# Patient Record
Sex: FEMALE | Race: WHITE | NOT HISPANIC OR LATINO | Employment: FULL TIME | URBAN - METROPOLITAN AREA
[De-identification: names, ages, dates, MRNs, and addresses within clinical notes are randomized per-mention and may not be internally consistent; named-entity substitution may affect disease eponyms.]

---

## 2017-01-07 ENCOUNTER — HOSPITAL ENCOUNTER (OUTPATIENT)
Dept: LAB | Facility: MEDICAL CENTER | Age: 37
End: 2017-01-07
Attending: NURSE PRACTITIONER
Payer: COMMERCIAL

## 2017-01-07 LAB
ALBUMIN SERPL BCP-MCNC: 4.3 G/DL (ref 3.2–4.9)
ALBUMIN/GLOB SERPL: 1.4 G/DL
ALP SERPL-CCNC: 54 U/L (ref 30–99)
ALT SERPL-CCNC: 11 U/L (ref 2–50)
AMYLASE SERPL-CCNC: 46 U/L (ref 20–103)
ANION GAP SERPL CALC-SCNC: 4 MMOL/L (ref 0–11.9)
AST SERPL-CCNC: 14 U/L (ref 12–45)
BASOPHILS # BLD AUTO: 0.05 K/UL (ref 0–0.12)
BASOPHILS NFR BLD AUTO: 0.6 % (ref 0–1.8)
BILIRUB SERPL-MCNC: 0.4 MG/DL (ref 0.1–1.5)
BUN SERPL-MCNC: 16 MG/DL (ref 8–22)
CALCIUM SERPL-MCNC: 9.6 MG/DL (ref 8.5–10.5)
CHLORIDE SERPL-SCNC: 107 MMOL/L (ref 96–112)
CHOLEST SERPL-MCNC: 153 MG/DL (ref 100–199)
CO2 SERPL-SCNC: 27 MMOL/L (ref 20–33)
CREAT SERPL-MCNC: 0.6 MG/DL (ref 0.5–1.4)
EOSINOPHIL # BLD: 0.14 K/UL (ref 0–0.51)
EOSINOPHIL NFR BLD AUTO: 1.6 % (ref 0–6.9)
ERYTHROCYTE [DISTWIDTH] IN BLOOD BY AUTOMATED COUNT: 44.6 FL (ref 35.9–50)
GLOBULIN SER CALC-MCNC: 3.1 G/DL (ref 1.9–3.5)
GLUCOSE SERPL-MCNC: 83 MG/DL (ref 65–99)
HCT VFR BLD AUTO: 39.6 % (ref 37–47)
HDLC SERPL-MCNC: 55 MG/DL
HGB BLD-MCNC: 12.8 G/DL (ref 12–16)
IMM GRANULOCYTES # BLD AUTO: 0.01 K/UL (ref 0–0.11)
IMM GRANULOCYTES NFR BLD AUTO: 0.1 % (ref 0–0.9)
LDLC SERPL CALC-MCNC: 82 MG/DL
LIPASE SERPL-CCNC: 21 U/L (ref 11–82)
LYMPHOCYTES # BLD: 4.18 K/UL (ref 1–4.8)
LYMPHOCYTES NFR BLD AUTO: 46.5 % (ref 22–41)
MCH RBC QN AUTO: 31.9 PG (ref 27–33)
MCHC RBC AUTO-ENTMCNC: 32.3 G/DL (ref 33.6–35)
MCV RBC AUTO: 98.8 FL (ref 81.4–97.8)
MONOCYTES # BLD: 0.56 K/UL (ref 0–0.85)
MONOCYTES NFR BLD AUTO: 6.2 % (ref 0–13.4)
NEUTROPHILS # BLD: 4.05 K/UL (ref 2–7.15)
NEUTROPHILS NFR BLD AUTO: 45 % (ref 44–72)
NRBC # BLD AUTO: 0 K/UL
NRBC BLD-RTO: 0 /100 WBC
PLATELET # BLD AUTO: 233 K/UL (ref 164–446)
PMV BLD AUTO: 10.5 FL (ref 9–12.9)
POTASSIUM SERPL-SCNC: 4.5 MMOL/L (ref 3.6–5.5)
PROT SERPL-MCNC: 7.4 G/DL (ref 6–8.2)
RBC # BLD AUTO: 4.01 M/UL (ref 4.2–5.4)
SODIUM SERPL-SCNC: 138 MMOL/L (ref 135–145)
TRIGL SERPL-MCNC: 79 MG/DL (ref 0–149)
TSH SERPL DL<=0.005 MIU/L-ACNC: 1.14 UIU/ML (ref 0.3–3.7)
WBC # BLD AUTO: 9 K/UL (ref 4.8–10.8)

## 2017-01-07 PROCEDURE — 84443 ASSAY THYROID STIM HORMONE: CPT

## 2017-01-07 PROCEDURE — 85025 COMPLETE CBC W/AUTO DIFF WBC: CPT

## 2017-01-07 PROCEDURE — 83690 ASSAY OF LIPASE: CPT

## 2017-01-07 PROCEDURE — 82150 ASSAY OF AMYLASE: CPT

## 2017-01-07 PROCEDURE — 80053 COMPREHEN METABOLIC PANEL: CPT

## 2017-01-07 PROCEDURE — 36415 COLL VENOUS BLD VENIPUNCTURE: CPT

## 2017-01-07 PROCEDURE — 80061 LIPID PANEL: CPT

## 2017-01-13 ENCOUNTER — HOSPITAL ENCOUNTER (OUTPATIENT)
Dept: RADIOLOGY | Facility: MEDICAL CENTER | Age: 37
End: 2017-01-13
Attending: NURSE PRACTITIONER
Payer: COMMERCIAL

## 2017-01-13 DIAGNOSIS — R10.11 ABDOMINAL PAIN, RIGHT UPPER QUADRANT: ICD-10-CM

## 2017-01-13 PROCEDURE — 76700 US EXAM ABDOM COMPLETE: CPT

## 2017-02-08 ENCOUNTER — OFFICE VISIT (OUTPATIENT)
Dept: URGENT CARE | Facility: PHYSICIAN GROUP | Age: 37
End: 2017-02-08
Payer: COMMERCIAL

## 2017-02-08 VITALS
WEIGHT: 189.9 LBS | OXYGEN SATURATION: 96 % | RESPIRATION RATE: 16 BRPM | SYSTOLIC BLOOD PRESSURE: 102 MMHG | DIASTOLIC BLOOD PRESSURE: 72 MMHG | TEMPERATURE: 99.3 F | HEART RATE: 88 BPM

## 2017-02-08 DIAGNOSIS — M43.6 TORTICOLLIS, ACQUIRED: ICD-10-CM

## 2017-02-08 DIAGNOSIS — H57.89 EYE IRRITATION: ICD-10-CM

## 2017-02-08 PROCEDURE — 99214 OFFICE O/P EST MOD 30 MIN: CPT | Performed by: PHYSICIAN ASSISTANT

## 2017-02-08 RX ORDER — CIPROFLOXACIN HYDROCHLORIDE 3.5 MG/ML
1 SOLUTION/ DROPS TOPICAL EVERY 4 HOURS
Qty: 1 BOTTLE | Refills: 0 | Status: SHIPPED | OUTPATIENT
Start: 2017-02-08 | End: 2019-02-07

## 2017-02-08 RX ORDER — CYCLOBENZAPRINE HCL 10 MG
TABLET ORAL
Qty: 10 TAB | Refills: 0 | Status: SHIPPED | OUTPATIENT
Start: 2017-02-08 | End: 2019-02-07

## 2017-02-08 NOTE — MR AVS SNAPSHOT
Mattie Hicks   2017 6:30 PM   Office Visit   MRN: [id number]    Department:  [facility]   Dept Phone:  [phone]    Description:  Female : 1980   Provider:  Jannet Cedeno PA-C           Reason for Visit     Conjunctivitis pink eye, muscle spasam in neck      Allergies as of 2017     No Known Allergies      Vital Signs     Blood Pressure Pulse Temperature Respirations Weight Oxygen Saturation    102/72 mmHg 88 37.4 °C (99.3 °F) 16 86.138 kg (189 lb 14.4 oz) 96%    Smoking Status                   Never Smoker            Basic Information     Date Of Birth Sex Race Ethnicity Preferred Language    1980 Female White Unknown English      Health Maintenance        Date Due Completion Dates    IMM DTaP/Tdap/Td Vaccine (1 - Tdap) 1998 ---    PAP SMEAR 2000 ---    IMM INFLUENZA (1) 2016 ---            Current Immunizations     No immunizations on file.      Below and/or attached are the medications your provider expects you to take. Review all of your home medications and newly ordered medications with your provider and/or pharmacist. Follow medication instructions as directed by your provider and/or pharmacist. Please keep your medication list with you and share with your provider. Update the information when medications are discontinued, doses are changed, or new medications (including over-the-counter products) are added; and carry medication information at all times in the event of emergency situations     Allergies:  No Known Allergies          Medications  Valid as of: 2017 -  7:16 PM    Generic Name Brand Name Tablet Size Instructions for use    .                 Medicines prescribed today were sent to:     [id number] [place] [address] [place] AT [address] [place] [address]    Phone: [phone] Fax: [phone]    Open 24 Hours?: No      Medication refill instructions:       If  your prescription bottle indicates you have medication refills left, it is not necessary to call your provider’s office. Please contact your pharmacy and they will refill your medication.    If your prescription bottle indicates you do not have any refills left, you may request refills at any time through one of the following ways: The online [employer] system (except Urgent Care), by calling your provider’s office, or by asking your pharmacy to contact your provider’s office with a refill request. Medication refills are processed only during regular business hours and may not be available until the next business day. Your provider may request additional information or to have a follow-up visit with you prior to refilling your medication.   *Please Note: Medication refills are assigned a new Rx number when refilled electronically. Your pharmacy may indicate that no refills were authorized even though a new prescription for the same medication is available at the pharmacy. Please request the medicine by name with the pharmacy before contacting your provider for a refill.           [employer] Access Code: [email or web address]  Expires: 2/19/2017 10:27 AM    [employer]  A secure, online tool to manage your health information     [employer]’s [employer]® is a secure, online tool that connects you to your personalized health information from the privacy of your home -- day or night - making it very easy for you to manage your healthcare. Once the activation process is completed, you can even access your medical information using the [employer] archie, which is available for free in the Apple Archie store or Google Play store.     [employer] provides the following levels of access (as shown below):   My Chart Features   [facility] Doctor [facility]  Specialists [facility]  Urgent  Care Non-[facility]  Primary Care  Doctor   Email your healthcare team securely and privately 24/7 X X X    Manage appointments: schedule your next appointment; view  details of past/upcoming appointments X      Request prescription refills. X      View recent personal medical records, including lab and immunizations X X X X   View health record, including health history, allergies, medications X X X X   Read reports about your outpatient visits, procedures, consult and ER notes X X X X   See your discharge summary, which is a recap of your hospital and/or ER visit that includes your diagnosis, lab results, and care plan. X X       How to register for [employer]:  1. Go to  [email or web address].  2. Click on the Sign Up Now box, which takes you to the New Member Sign Up page. You will need to provide the following information:  a. Enter your [employer] Access Code exactly as it appears at the top of this page. (You will not need to use this code after you’ve completed the sign-up process. If you do not sign up before the expiration date, you must request a new code.)   b. Enter your date of birth.   c. Enter your home email address.   d. Click Submit, and follow the next screen’s instructions.  3. Create a [employer] ID. This will be your [employer] login ID and cannot be changed, so think of one that is secure and easy to remember.  4. Create a [employer] password. You can change your password at any time.  5. Enter your Password Reset Question and Answer. This can be used at a later time if you forget your password.   6. Enter your e-mail address. This allows you to receive e-mail notifications when new information is available in [employer].  7. Click Sign Up. You can now view your health information.    For assistance activating your [employer] account, call [phone]

## 2017-02-09 NOTE — PROGRESS NOTES
"Chief Complaint   Patient presents with   • Conjunctivitis     pink eye, muscle spasam in neck       HISTORY OF PRESENT ILLNESS: Patient is a 36 y.o. female who presents today for    1. Exposure to bacterial pink eye - her with  who has overt symptoms of pink eye and son was just treated.   She is starting to feel bilateral eye irritation, discomfort however has not noticed any redness or discharge at this point.  Does not wear contacts. No vision changes.  Has had some mild nasal congestion. No sore throat.  No coughing.      2.  Hx of neck pain/spasms, comes and goes unpredictably.  She feels that tightness/kink in her neck she gets is \"coming on as well\" and wants to know what she can take.  Has had this before but has never been treated.  No hx of spinal or neck injury.  No numbness, tingling or weakness in extremities.     There are no active problems to display for this patient.      Allergies:Review of patient's allergies indicates no known allergies.    No current [employer]-ordered outpatient prescriptions on file.     No current [employer]-ordered facility-administered medications on file.       History reviewed. No pertinent past medical history.    Social History   Substance Use Topics   • Smoking status: Never Smoker    • Smokeless tobacco: Never Used   • Alcohol Use: None       No family status information on file.   History reviewed. No pertinent family history.    ROS:  Review of Systems   Constitutional: Negative for fever, chills, weight loss and malaise/fatigue.   HENT: SEE HPI  Eyes: SEE HPI  Respiratory: Negative for cough, sputum production, shortness of breath and wheezing.    Cardiovascular: Negative for chest pain, palpitations, orthopnea and leg swelling.   Gastrointestinal: Negative for heartburn, nausea, vomiting and abdominal pain.   All other systems reviewed and are negative.       Exam:  Blood pressure 102/72, pulse 88, temperature 37.4 °C (99.3 °F), resp. rate 16, weight 86.138 kg (189 lb " 14.4 oz), SpO2 96 %.  General:  Well nourished, well developed female in NAD  Eyes: PERRLA, EOM within normal limits, no conjunctival injection, no scleral icterus, visual fields and acuity grossly intact.  Ears: Normal shape and symmetry, no tenderness, no discharge. External canals are without any significant edema or erythema. Tympanic membranes are without any inflammation, no effusion. Gross auditory acuity is intact  Nose: Symmetrical, sinuses without tenderness, no discharge.   Mouth: reasonable hygiene, no erythema exudates or tonsillar enlargement.  Neck: no masses, range of motion within normal limits, mild pain with lateral rotation to the far right, mild TTP along paraspinous muscles in right trap with spasms, normal CMS bilateral upper extremities, no tracheal deviation. No lymphadenopathy  Pulmonary: Normal respiratory effort, no wheezes, crackles, or rhonchi.  Cardiovascular: regular rate and rhythm without murmurs, rubs, or gallops.  Abdomen: Soft, nontender, nondistended. Normal bowel sounds. No hepatosplenomegaly or masses, or hernias. No rebound or guarding.  Skin: No visible rashes or lesion. Warm, pink, dry.   Extremities: no clubbing, cyanosis, or edema.  Neuro: A&O x 3. Speech normal/clear.  Normal gait.       Assessment/Plan:  1. Eye irritation  ciprofloxacin (CILOXIN) 0.3 % Solution    Exposure to bacterial pink eye   2. Torticollis, acquired  cyclobenzaprine (FLEXERIL) 10 MG Tab       -exposure to bacterial conjunctivitis others are being treated/or have been treated for,  -treatment as above.    -hand hygiene emphasized, warm compresses for soothing if needed  -ER precautions regarding eyes discussed  -recommend heat/ice prn.  Gentle stretches daily.   -Flexeril if needed for bedtime/spasms. No driving, caution drowsy.  Recommend anti-inflammatories OTC with food.   -neck pain red flags and ER precautions discussed with patient.     Supportive care, differential diagnoses, and indications  for immediate follow-up discussed with patient.   Pathogenesis of diagnosis discussed including typical length and natural progression.   Instructed to return to clinic or nearest emergency department for any change in condition, further concerns, or worsening of symptoms.  Patient states understanding of the plan of care and discharge instructions.  Instructed to make an appointment, for follow up, with their primary care provider.      Jannet Cedeno PA-C

## 2017-04-08 ENCOUNTER — HOSPITAL ENCOUNTER (OUTPATIENT)
Dept: LAB | Facility: MEDICAL CENTER | Age: 37
End: 2017-04-08
Attending: NURSE PRACTITIONER
Payer: COMMERCIAL

## 2017-04-08 LAB
BASOPHILS # BLD AUTO: 0.6 % (ref 0–1.8)
BASOPHILS # BLD: 0.04 K/UL (ref 0–0.12)
EOSINOPHIL # BLD AUTO: 0.1 K/UL (ref 0–0.51)
EOSINOPHIL NFR BLD: 1.4 % (ref 0–6.9)
ERYTHROCYTE [DISTWIDTH] IN BLOOD BY AUTOMATED COUNT: 44.6 FL (ref 35.9–50)
HCT VFR BLD AUTO: 40.3 % (ref 37–47)
HGB BLD-MCNC: 12.7 G/DL (ref 12–16)
IMM GRANULOCYTES # BLD AUTO: 0.01 K/UL (ref 0–0.11)
IMM GRANULOCYTES NFR BLD AUTO: 0.1 % (ref 0–0.9)
LYMPHOCYTES # BLD AUTO: 3.56 K/UL (ref 1–4.8)
LYMPHOCYTES NFR BLD: 49.9 % (ref 22–41)
MCH RBC QN AUTO: 31 PG (ref 27–33)
MCHC RBC AUTO-ENTMCNC: 31.5 G/DL (ref 33.6–35)
MCV RBC AUTO: 98.3 FL (ref 81.4–97.8)
MONOCYTES # BLD AUTO: 0.38 K/UL (ref 0–0.85)
MONOCYTES NFR BLD AUTO: 5.3 % (ref 0–13.4)
NEUTROPHILS # BLD AUTO: 3.04 K/UL (ref 2–7.15)
NEUTROPHILS NFR BLD: 42.7 % (ref 44–72)
NRBC # BLD AUTO: 0 K/UL
NRBC BLD AUTO-RTO: 0 /100 WBC
PLATELET # BLD AUTO: 245 K/UL (ref 164–446)
PMV BLD AUTO: 10.6 FL (ref 9–12.9)
RBC # BLD AUTO: 4.1 M/UL (ref 4.2–5.4)
WBC # BLD AUTO: 7.1 K/UL (ref 4.8–10.8)

## 2017-04-08 PROCEDURE — 85025 COMPLETE CBC W/AUTO DIFF WBC: CPT

## 2017-04-08 PROCEDURE — 36415 COLL VENOUS BLD VENIPUNCTURE: CPT

## 2017-10-29 ENCOUNTER — OFFICE VISIT (OUTPATIENT)
Dept: URGENT CARE | Facility: PHYSICIAN GROUP | Age: 37
End: 2017-10-29
Payer: COMMERCIAL

## 2017-10-29 VITALS
SYSTOLIC BLOOD PRESSURE: 108 MMHG | WEIGHT: 191 LBS | OXYGEN SATURATION: 96 % | DIASTOLIC BLOOD PRESSURE: 70 MMHG | HEART RATE: 71 BPM | BODY MASS INDEX: 28.29 KG/M2 | TEMPERATURE: 98.7 F | HEIGHT: 69 IN

## 2017-10-29 DIAGNOSIS — H10.9 CONJUNCTIVITIS OF RIGHT EYE, UNSPECIFIED CONJUNCTIVITIS TYPE: ICD-10-CM

## 2017-10-29 PROCEDURE — 99213 OFFICE O/P EST LOW 20 MIN: CPT | Performed by: EMERGENCY MEDICINE

## 2017-10-29 RX ORDER — LORAZEPAM 1 MG/1
1 TABLET ORAL EVERY 4 HOURS PRN
COMMUNITY
End: 2018-08-17 | Stop reason: SDUPTHER

## 2017-10-29 RX ORDER — ERYTHROMYCIN 5 MG/G
1 OINTMENT OPHTHALMIC 4 TIMES DAILY
Qty: 1 TUBE | Refills: 1 | Status: SHIPPED | OUTPATIENT
Start: 2017-10-29 | End: 2019-02-07

## 2017-10-29 ASSESSMENT — ENCOUNTER SYMPTOMS
COUGH: 0
VOMITING: 0
ABDOMINAL PAIN: 0
EYE DISCHARGE: 1
SENSORY CHANGE: 0
BLURRED VISION: 0
NAUSEA: 0
FEVER: 0
EYE PAIN: 0
HEADACHES: 0
MYALGIAS: 0
EYE REDNESS: 1

## 2017-10-29 NOTE — PROGRESS NOTES
Subjective:      Mattie Hicks is a 37 y.o. female who presents with Eye Problem (Rt eye is red, irritated and swollen - started last night. )            HPI  Patient is a pleasant 37-year-old female complaining of pinkeye. She states it started last night only in her right eye. Should she awaken this morning her eye was stuck shut with realignment discharge. She works in the [employer] and states that there is a risk of being exposed to various illnesses. She denies any history of glaucoma herpes or shingles. She denies any fever chills nausea vomiting and diarrhea. She does not feel there was any trauma to her eye this happened after she went to bed.  No Known Allergies  Social History     Social History   • Marital status:      Spouse name: N/A   • Number of children: N/A   • Years of education: N/A     Occupational History   • Not on file.     Social History Main Topics   • Smoking status: Never Smoker   • Smokeless tobacco: Never Used   • Alcohol use Not on file   • Drug use: No   • Sexual activity: Not on file     Other Topics Concern   • Not on file     Social History Narrative   • No narrative on file   History reviewed. No pertinent past medical history.   Current Outpatient Prescriptions on File Prior to Visit   Medication Sig Dispense Refill   • ciprofloxacin (CILOXIN) 0.3 % Solution Place 1 Drop in both eyes every 4 hours. 1 Bottle 0   • cyclobenzaprine (FLEXERIL) 10 MG Tab 1/2 to 1 tablet at bedtime prn spasms.  No driving. Caution drowsy 10 Tab 0     No current facility-administered medications on file prior to visit.    No family history on file.  Review of Systems   Constitutional: Negative for fever.   Eyes: Positive for discharge and redness. Negative for blurred vision and pain.   Respiratory: Negative for cough.         Patient's  has URI symptoms.   Gastrointestinal: Negative for abdominal pain, nausea and vomiting.   Musculoskeletal: Negative for myalgias.   Skin: Negative for itching  "and rash.   Neurological: Negative for sensory change and headaches.          Objective:     /70   Pulse 71   Temp 37.1 °C (98.7 °F)   Ht 1.753 m (5' 9\")   Wt 86.6 kg (191 lb)   SpO2 96%   BMI 28.21 kg/m²      Physical Exam   Constitutional: She appears well-developed and well-nourished.   HENT:   Head: Normocephalic and atraumatic.   Right Ear: External ear normal.   Left Ear: External ear normal.   Eyes: EOM are normal. Pupils are equal, round, and reactive to light. Right eye exhibits discharge. Left eye exhibits no discharge. No scleral icterus.   Patient has conjunctival irritation of her right eye. With purulent discharge on her lids. Otherwise her left eye appears normal.   Neck: Normal range of motion.   Cardiovascular: Normal rate.    Pulmonary/Chest: Effort normal. No respiratory distress.   Musculoskeletal: Normal range of motion.   Skin: Skin is warm and dry. No rash noted. She is not diaphoretic. No erythema.   Psychiatric: She has a normal mood and affect. Her behavior is normal.            Visual acuities 20/50, 20/40, 20/30  Assessment/Plan:     Diagnosis acute conjunctivitis..    Patient will be given a note for the next 2 days off. I've written her a prescription for erythromycin ophthalmic ointment which she'll apply to her right eye 4 times a day. If her symptoms improved her left ischial had coverage to her left eye. Recommend she uses for the next 4 days persistent irritation/drainage. She needs to be evaluated by the urgent care/ER/primary care provider. He gets worse in any time I feel she should go to the emergency room or follow-up with an ophthalmologist  "

## 2017-10-29 NOTE — LETTER
October 29, 2017        Mattie Hicks  [address] [place] [address]        Dear Mattie:    Please ask for the net two days off from work for medical reasons.    If you have any questions or concerns, please don't hesitate to call.        Sincerely,        Kleber Fox M.D.    Electronically Signed

## 2017-12-19 ENCOUNTER — HOSPITAL ENCOUNTER (OUTPATIENT)
Dept: LAB | Facility: MEDICAL CENTER | Age: 37
End: 2017-12-19
Attending: NURSE PRACTITIONER
Payer: COMMERCIAL

## 2017-12-19 LAB
ALBUMIN SERPL BCP-MCNC: 4.4 G/DL (ref 3.2–4.9)
ALBUMIN/GLOB SERPL: 1.4 G/DL
ALP SERPL-CCNC: 55 U/L (ref 30–99)
ALT SERPL-CCNC: 18 U/L (ref 2–50)
AMYLASE SERPL-CCNC: 44 U/L (ref 20–103)
ANION GAP SERPL CALC-SCNC: 9 MMOL/L (ref 0–11.9)
AST SERPL-CCNC: 20 U/L (ref 12–45)
BASOPHILS # BLD AUTO: 0.4 % (ref 0–1.8)
BASOPHILS # BLD: 0.03 K/UL (ref 0–0.12)
BILIRUB SERPL-MCNC: 0.6 MG/DL (ref 0.1–1.5)
BUN SERPL-MCNC: 13 MG/DL (ref 8–22)
CALCIUM SERPL-MCNC: 9.4 MG/DL (ref 8.5–10.5)
CHLORIDE SERPL-SCNC: 105 MMOL/L (ref 96–112)
CHOLEST SERPL-MCNC: 151 MG/DL (ref 100–199)
CO2 SERPL-SCNC: 24 MMOL/L (ref 20–33)
CREAT SERPL-MCNC: 0.59 MG/DL (ref 0.5–1.4)
CRP SERPL HS-MCNC: 0.11 MG/DL (ref 0–0.75)
EOSINOPHIL # BLD AUTO: 0.07 K/UL (ref 0–0.51)
EOSINOPHIL NFR BLD: 1 % (ref 0–6.9)
ERYTHROCYTE [DISTWIDTH] IN BLOOD BY AUTOMATED COUNT: 45.2 FL (ref 35.9–50)
ERYTHROCYTE [SEDIMENTATION RATE] IN BLOOD BY WESTERGREN METHOD: 19 MM/HOUR (ref 0–20)
GFR SERPL CREATININE-BSD FRML MDRD: >60 ML/MIN/1.73 M 2
GLOBULIN SER CALC-MCNC: 3.2 G/DL (ref 1.9–3.5)
GLUCOSE SERPL-MCNC: 75 MG/DL (ref 65–99)
HCT VFR BLD AUTO: 39.7 % (ref 37–47)
HDLC SERPL-MCNC: 54 MG/DL
HGB BLD-MCNC: 12.9 G/DL (ref 12–16)
IMM GRANULOCYTES # BLD AUTO: 0.01 K/UL (ref 0–0.11)
IMM GRANULOCYTES NFR BLD AUTO: 0.1 % (ref 0–0.9)
LDLC SERPL CALC-MCNC: 76 MG/DL
LIPASE SERPL-CCNC: 14 U/L (ref 11–82)
LYMPHOCYTES # BLD AUTO: 2.78 K/UL (ref 1–4.8)
LYMPHOCYTES NFR BLD: 40.2 % (ref 22–41)
MCH RBC QN AUTO: 31.2 PG (ref 27–33)
MCHC RBC AUTO-ENTMCNC: 32.5 G/DL (ref 33.6–35)
MCV RBC AUTO: 96.1 FL (ref 81.4–97.8)
MONOCYTES # BLD AUTO: 0.39 K/UL (ref 0–0.85)
MONOCYTES NFR BLD AUTO: 5.6 % (ref 0–13.4)
NEUTROPHILS # BLD AUTO: 3.63 K/UL (ref 2–7.15)
NEUTROPHILS NFR BLD: 52.7 % (ref 44–72)
NRBC # BLD AUTO: 0 K/UL
NRBC BLD-RTO: 0 /100 WBC
PLATELET # BLD AUTO: 261 K/UL (ref 164–446)
PMV BLD AUTO: 10.4 FL (ref 9–12.9)
POTASSIUM SERPL-SCNC: 3.9 MMOL/L (ref 3.6–5.5)
PROT SERPL-MCNC: 7.6 G/DL (ref 6–8.2)
RBC # BLD AUTO: 4.13 M/UL (ref 4.2–5.4)
RHEUMATOID FACT SER IA-ACNC: <10 IU/ML (ref 0–14)
SODIUM SERPL-SCNC: 138 MMOL/L (ref 135–145)
T4 FREE SERPL-MCNC: 0.74 NG/DL (ref 0.53–1.43)
THYROPEROXIDASE AB SERPL-ACNC: 0.5 IU/ML (ref 0–9)
TRIGL SERPL-MCNC: 104 MG/DL (ref 0–149)
TSH SERPL DL<=0.005 MIU/L-ACNC: 1.4 UIU/ML (ref 0.38–5.33)
URATE SERPL-MCNC: 4.9 MG/DL (ref 1.9–8.2)
WBC # BLD AUTO: 6.9 K/UL (ref 4.8–10.8)

## 2017-12-19 PROCEDURE — 85025 COMPLETE CBC W/AUTO DIFF WBC: CPT

## 2017-12-19 PROCEDURE — 84443 ASSAY THYROID STIM HORMONE: CPT

## 2017-12-19 PROCEDURE — 86140 C-REACTIVE PROTEIN: CPT

## 2017-12-19 PROCEDURE — 85652 RBC SED RATE AUTOMATED: CPT

## 2017-12-19 PROCEDURE — 82150 ASSAY OF AMYLASE: CPT

## 2017-12-19 PROCEDURE — 84439 ASSAY OF FREE THYROXINE: CPT

## 2017-12-19 PROCEDURE — 80061 LIPID PANEL: CPT

## 2017-12-19 PROCEDURE — 86038 ANTINUCLEAR ANTIBODIES: CPT

## 2017-12-19 PROCEDURE — 86060 ANTISTREPTOLYSIN O TITER: CPT

## 2017-12-19 PROCEDURE — 36415 COLL VENOUS BLD VENIPUNCTURE: CPT

## 2017-12-19 PROCEDURE — 84550 ASSAY OF BLOOD/URIC ACID: CPT

## 2017-12-19 PROCEDURE — 86431 RHEUMATOID FACTOR QUANT: CPT

## 2017-12-19 PROCEDURE — 83690 ASSAY OF LIPASE: CPT

## 2017-12-19 PROCEDURE — 86376 MICROSOMAL ANTIBODY EACH: CPT

## 2017-12-19 PROCEDURE — 80053 COMPREHEN METABOLIC PANEL: CPT

## 2017-12-21 LAB
ASO AB SERPL-ACNC: 133 IU/ML (ref 0–330)
NUCLEAR IGG SER QL IA: NORMAL

## 2018-03-15 ENCOUNTER — OFFICE VISIT (OUTPATIENT)
Dept: URGENT CARE | Facility: PHYSICIAN GROUP | Age: 38
End: 2018-03-15
Payer: COMMERCIAL

## 2018-03-15 VITALS
OXYGEN SATURATION: 97 % | RESPIRATION RATE: 16 BRPM | DIASTOLIC BLOOD PRESSURE: 62 MMHG | TEMPERATURE: 100 F | HEIGHT: 69 IN | BODY MASS INDEX: 28.14 KG/M2 | WEIGHT: 190 LBS | SYSTOLIC BLOOD PRESSURE: 98 MMHG | HEART RATE: 99 BPM

## 2018-03-15 DIAGNOSIS — R05.9 COUGH: ICD-10-CM

## 2018-03-15 DIAGNOSIS — J11.1 INFLUENZA-LIKE ILLNESS: ICD-10-CM

## 2018-03-15 PROCEDURE — 99214 OFFICE O/P EST MOD 30 MIN: CPT | Performed by: FAMILY MEDICINE

## 2018-03-15 RX ORDER — PROMETHAZINE HYDROCHLORIDE AND CODEINE PHOSPHATE 6.25; 1 MG/5ML; MG/5ML
5 SYRUP ORAL 4 TIMES DAILY PRN
Qty: 120 ML | Refills: 0 | Status: SHIPPED | OUTPATIENT
Start: 2018-03-15 | End: 2018-03-22

## 2018-03-15 RX ORDER — OSELTAMIVIR PHOSPHATE 75 MG/1
75 CAPSULE ORAL 2 TIMES DAILY
Qty: 10 CAP | Refills: 0 | Status: SHIPPED | OUTPATIENT
Start: 2018-03-15 | End: 2018-03-20

## 2018-03-15 ASSESSMENT — ENCOUNTER SYMPTOMS
STRIDOR: 0
EYE REDNESS: 0
WEIGHT LOSS: 0
EYE DISCHARGE: 0

## 2018-03-15 NOTE — LETTER
March 15, 2018         Patient: Mattie Hicks   YOB: 1980   Date of Visit: 3/15/2018           To Whom it May Concern:    Mattie Hicks was seen in my clinic on 3/15/2018. Please excuse 3/15 and 3/16/2018.      Sincerely,           Binu Aviles M.D.  Electronically Signed

## 2018-03-15 NOTE — PROGRESS NOTES
"Subjective:      Mattie Hicks is a 37 y.o. female who presents with Body Aches (x 2 days )            <48 hr HA, myalgia, dry cough, subjective fever/chills. No SOB/wheeze. No vomit/diarrhea. +nasal congestion.  +ST. Minimal relief with OTC medications. Sx's are severe. No other aggravating or alleviating factors.          Review of Systems   Constitutional: Positive for malaise/fatigue. Negative for weight loss.   HENT: Negative for ear discharge and ear pain.    Eyes: Negative for discharge and redness.   Respiratory: Negative for stridor.    Cardiovascular: Negative for leg swelling.   Musculoskeletal: Positive for joint pain.   Skin: Negative for itching.          Objective:     BP (!) 98/62   Pulse 99   Temp 37.8 °C (100 °F)   Resp 16   Ht 1.753 m (5' 9\")   Wt 86.2 kg (190 lb)   SpO2 97%   BMI 28.06 kg/m²      Physical Exam   Constitutional: She appears well-developed and well-nourished. No distress.   HENT:   Head: Normocephalic and atraumatic.   Right Ear: External ear normal.   Left Ear: External ear normal.   Mouth/Throat: Oropharynx is clear and moist.   Nasal congestion   Eyes: Conjunctivae are normal.   Neck: Neck supple.   Cardiovascular: Normal rate, regular rhythm and normal heart sounds.    No murmur heard.  Pulmonary/Chest: Effort normal and breath sounds normal. She has no wheezes.   Lymphadenopathy:     She has no cervical adenopathy.   Neurological:   Speech is clear. Patient is appropriate and cooperative.     Skin: Skin is dry. No rash noted.               Assessment/Plan:     1. Influenza-like illness    - oseltamivir (TAMIFLU) 75 MG Cap; Take 1 Cap by mouth 2 times a day for 5 days.  Dispense: 10 Cap; Refill: 0    2. Cough    - promethazine-codeine (PHENERGAN-CODEINE) 6.25-10 MG/5ML Syrup; Take 5 mL by mouth 4 times a day as needed for up to 7 days.  Dispense: 120 mL; Refill: 0    .  Differential diagnosis, natural history, supportive care, and indications for immediate follow-up " discussed at length.

## 2018-06-01 ENCOUNTER — OFFICE VISIT (OUTPATIENT)
Dept: URGENT CARE | Facility: PHYSICIAN GROUP | Age: 38
End: 2018-06-01
Payer: COMMERCIAL

## 2018-06-01 VITALS
HEART RATE: 80 BPM | BODY MASS INDEX: 28.95 KG/M2 | TEMPERATURE: 98.3 F | DIASTOLIC BLOOD PRESSURE: 74 MMHG | OXYGEN SATURATION: 95 % | HEIGHT: 68 IN | WEIGHT: 191 LBS | RESPIRATION RATE: 16 BRPM | SYSTOLIC BLOOD PRESSURE: 110 MMHG

## 2018-06-01 DIAGNOSIS — J03.00 STREP TONSILLITIS: ICD-10-CM

## 2018-06-01 PROCEDURE — 99214 OFFICE O/P EST MOD 30 MIN: CPT | Performed by: FAMILY MEDICINE

## 2018-06-01 RX ORDER — PENICILLIN V POTASSIUM 500 MG/1
500 TABLET ORAL 2 TIMES DAILY
Qty: 20 TAB | Refills: 0 | Status: SHIPPED | OUTPATIENT
Start: 2018-06-01 | End: 2018-06-11

## 2018-06-01 ASSESSMENT — ENCOUNTER SYMPTOMS
HEADACHES: 1
EYE REDNESS: 0
EYE DISCHARGE: 0
MYALGIAS: 1
WEIGHT LOSS: 0

## 2018-06-01 ASSESSMENT — PAIN SCALES - GENERAL: PAINLEVEL: 9=SEVERE PAIN

## 2018-06-01 NOTE — PROGRESS NOTES
"Subjective:      Mattie Hicks is a 38 y.o. female who presents with Pharyngitis (sore throat, x 3 days)            3 days ST, subjective fever/chills. Pain is severe with swallow but getting fluids down with normal urine output. No cough. No rash. No known strep exposure. Swollen neck glands. No other aggravating or alleviating factors.          Review of Systems   Constitutional: Positive for malaise/fatigue. Negative for weight loss.   HENT: Negative for congestion, ear discharge and ear pain.    Eyes: Negative for discharge and redness.   Musculoskeletal: Positive for myalgias.   Skin: Negative for itching and rash.   Neurological: Positive for headaches.     .'Medications, Allergies, and current problem list reviewed today in Epic       Objective:     /74   Pulse 80   Temp 36.8 °C (98.3 °F)   Resp 16   Ht 1.727 m (5' 8\")   Wt 86.6 kg (191 lb)   SpO2 95%   BMI 29.04 kg/m²      Physical Exam   Constitutional: She appears well-developed and well-nourished. No distress.   HENT:   Head: Normocephalic and atraumatic.   Right Ear: External ear normal.   Left Ear: External ear normal.   1+ red tonsils with purulent exudate. No soft palate petechiae. No evidence of abscess.     Eyes: Conjunctivae are normal.   Neck: Neck supple.   Cardiovascular: Normal rate, regular rhythm and normal heart sounds.    Pulmonary/Chest: Effort normal and breath sounds normal. No respiratory distress.   Lymphadenopathy:     She has cervical adenopathy (tender anterior).   Neurological:   Speech is clear. Patient is appropriate and cooperative.     Skin: Skin is warm and dry. No rash noted.               Assessment/Plan:     Strep +    1. Strep tonsillitis  penicillin v potassium (VEETID) 500 MG Tab    lidocaine viscous 2% (XYLOCAINE) 2 % Solution     Differential diagnosis, natural history, supportive care, and indications for immediate follow-up discussed at length.     "

## 2018-06-01 NOTE — LETTER
June 1, 2018         Patient: Mattie Hicks   YOB: 1980   Date of Visit: 6/1/2018           To Whom it May Concern:    Mattie Hicks was seen in my clinic on 6/1/2018. Please excuse today.        Sincerely,           Binu Aviles M.D.  Electronically Signed

## 2018-08-17 ENCOUNTER — OFFICE VISIT (OUTPATIENT)
Dept: MEDICAL GROUP | Facility: PHYSICIAN GROUP | Age: 38
End: 2018-08-17
Payer: COMMERCIAL

## 2018-08-17 ENCOUNTER — HOSPITAL ENCOUNTER (OUTPATIENT)
Dept: LAB | Facility: MEDICAL CENTER | Age: 38
End: 2018-08-17
Attending: FAMILY MEDICINE
Payer: COMMERCIAL

## 2018-08-17 VITALS
WEIGHT: 191.2 LBS | TEMPERATURE: 97.8 F | RESPIRATION RATE: 20 BRPM | SYSTOLIC BLOOD PRESSURE: 122 MMHG | HEART RATE: 76 BPM | OXYGEN SATURATION: 96 % | HEIGHT: 68 IN | BODY MASS INDEX: 28.98 KG/M2 | DIASTOLIC BLOOD PRESSURE: 72 MMHG

## 2018-08-17 DIAGNOSIS — Z23 NEED FOR VACCINATION: ICD-10-CM

## 2018-08-17 DIAGNOSIS — F41.9 ANXIETY AND DEPRESSION: ICD-10-CM

## 2018-08-17 DIAGNOSIS — R10.11 RUQ ABDOMINAL PAIN: ICD-10-CM

## 2018-08-17 DIAGNOSIS — G58.0 NEUROPATHY, INTERCOSTAL NERVE: ICD-10-CM

## 2018-08-17 DIAGNOSIS — F32.A ANXIETY AND DEPRESSION: ICD-10-CM

## 2018-08-17 LAB
ALBUMIN SERPL BCP-MCNC: 4.4 G/DL (ref 3.2–4.9)
ALBUMIN/GLOB SERPL: 1.3 G/DL
ALP SERPL-CCNC: 60 U/L (ref 30–99)
ALT SERPL-CCNC: 14 U/L (ref 2–50)
ANION GAP SERPL CALC-SCNC: 9 MMOL/L (ref 0–11.9)
AST SERPL-CCNC: 18 U/L (ref 12–45)
BILIRUB SERPL-MCNC: 0.4 MG/DL (ref 0.1–1.5)
BUN SERPL-MCNC: 16 MG/DL (ref 8–22)
CALCIUM SERPL-MCNC: 9.8 MG/DL (ref 8.5–10.5)
CHLORIDE SERPL-SCNC: 108 MMOL/L (ref 96–112)
CO2 SERPL-SCNC: 23 MMOL/L (ref 20–33)
CREAT SERPL-MCNC: 0.69 MG/DL (ref 0.5–1.4)
GLOBULIN SER CALC-MCNC: 3.5 G/DL (ref 1.9–3.5)
GLUCOSE SERPL-MCNC: 105 MG/DL (ref 65–99)
POTASSIUM SERPL-SCNC: 3.8 MMOL/L (ref 3.6–5.5)
PROT SERPL-MCNC: 7.9 G/DL (ref 6–8.2)
SODIUM SERPL-SCNC: 140 MMOL/L (ref 135–145)

## 2018-08-17 PROCEDURE — 36415 COLL VENOUS BLD VENIPUNCTURE: CPT

## 2018-08-17 PROCEDURE — 99214 OFFICE O/P EST MOD 30 MIN: CPT | Mod: 25 | Performed by: FAMILY MEDICINE

## 2018-08-17 PROCEDURE — 90715 TDAP VACCINE 7 YRS/> IM: CPT | Performed by: FAMILY MEDICINE

## 2018-08-17 PROCEDURE — 90471 IMMUNIZATION ADMIN: CPT | Performed by: FAMILY MEDICINE

## 2018-08-17 PROCEDURE — 80053 COMPREHEN METABOLIC PANEL: CPT

## 2018-08-17 RX ORDER — CHLORAL HYDRATE 500 MG
1000 CAPSULE ORAL
COMMUNITY
End: 2019-02-07

## 2018-08-17 RX ORDER — LORAZEPAM 1 MG/1
1 TABLET ORAL EVERY 8 HOURS PRN
Qty: 30 TAB | Refills: 0 | Status: SHIPPED
Start: 2018-08-17 | End: 2019-04-19 | Stop reason: SDUPTHER

## 2018-08-17 ASSESSMENT — PATIENT HEALTH QUESTIONNAIRE - PHQ9
SUM OF ALL RESPONSES TO PHQ9 QUESTIONS 1 AND 2: 0
8. MOVING OR SPEAKING SO SLOWLY THAT OTHER PEOPLE COULD HAVE NOTICED. OR THE OPPOSITE, BEING SO FIGETY OR RESTLESS THAT YOU HAVE BEEN MOVING AROUND A LOT MORE THAN USUAL: 3
4. FEELING TIRED OR HAVING LITTLE ENERGY: 3
9. THOUGHTS THAT YOU WOULD BE BETTER OFF DEAD, OR OF HURTING YOURSELF: 0
7. TROUBLE CONCENTRATING ON THINGS, SUCH AS READING THE NEWSPAPER OR WATCHING TELEVISION: 3
3. TROUBLE FALLING OR STAYING ASLEEP OR SLEEPING TOO MUCH: 3
1. LITTLE INTEREST OR PLEASURE IN DOING THINGS: 0
5. POOR APPETITE OR OVEREATING: 1
CLINICAL INTERPRETATION OF PHQ2 SCORE: 0
6. FEELING BAD ABOUT YOURSELF - OR THAT YOU ARE A FAILURE OR HAVE LET YOURSELF OR YOUR FAMILY DOWN: 0
SUM OF ALL RESPONSES TO PHQ QUESTIONS 1-9: 13
2. FEELING DOWN, DEPRESSED, IRRITABLE, OR HOPELESS: 0

## 2018-08-17 NOTE — PROGRESS NOTES
Chief Complaint   Patient presents with   • Establish Care   • Rib Pain     rt x 2 yrs   • Memory Loss       HISTORY OF PRESENT ILLNESS: Patient is a 38 y.o. female established patient here today for the following concerns:    1. Need for vaccination  Dur for Tdap vaccination    2. RUQ abdominal pain  3. Neuropathy, intercostal nerve  Reports pain over the right costal margin for the last 2 years.  Initially had sudden onset of pain with radiation to the back, subscapular in nature.  Not changed by food intake.  No associated nausea.  Has had intermittent constipation, diarrhea and bloating.  NO acholic stools.  Had work up 2 years ago including normal labs CMP lipase and amylase and US which showed possible early FLD but no biliary disease.  She reports that she has been working with GI on Miralax and FODMAPS diet.  She did have antibiotics a few months back for strep throat. In addition, she reports the pain to be cramping in nature, worse with bending forward or doing any abdominal exercises.       4. Anxiety and depression        Past Medical, Social, and Family history reviewed and updated in EPIC    Allergies:Patient has no known allergies.    Current Outpatient Prescriptions   Medication Sig Dispense Refill   • Omega-3 Fatty Acids (FISH OIL) 1000 MG Cap capsule Take 1,000 mg by mouth 3 times a day, with meals.     • sertraline (ZOLOFT) 50 MG Tab Take 1 Tab by mouth every day. 90 Tab 3   • LORazepam (ATIVAN) 1 MG Tab Take 1 Tab by mouth every 8 hours as needed for Anxiety for up to 30 days. 30 Tab 0   • lidocaine viscous 2% (XYLOCAINE) 2 % Solution Take 15 mL by mouth as needed for Throat/Mouth Pain. Gargle and spit every 4 hours as needed. (Patient not taking: Reported on 8/17/2018) 120 mL 0   • erythromycin 5 MG/GM Ointment Place 1 Application in right eye 4 times a day. (Patient not taking: Reported on 8/17/2018) 1 Tube 1   • ciprofloxacin (CILOXIN) 0.3 % Solution Place 1 Drop in both eyes every 4 hours.  "(Patient not taking: Reported on 8/17/2018) 1 Bottle 0   • cyclobenzaprine (FLEXERIL) 10 MG Tab 1/2 to 1 tablet at bedtime prn spasms.  No driving. Caution drowsy (Patient not taking: Reported on 8/17/2018) 10 Tab 0     No current facility-administered medications for this visit.          ROS:  Review of Systems   Constitutional: Negative for fever, chills, weight loss and malaise/fatigue.   HENT: Negative for ear pain, nosebleeds, congestion, sore throat and neck pain.    Eyes: Negative for blurred vision.   Respiratory: Negative for cough, sputum production, shortness of breath and wheezing.    Cardiovascular: Negative for chest pain, palpitations,  and leg swelling.   Gastrointestinal: Negative for heartburn, nausea, vomiting, diarrhea and abdominal pain.   Genitourinary: Negative for dysuria, urgency and frequency.   Musculoskeletal: Negative for myalgias, back pain and joint pain.   Skin: Negative for rash and itching.   Neurological: Negative for dizziness, tingling, tremors, sensory change, focal weakness and headaches.   Endo/Heme/Allergies: Does not bruise/bleed easily.   Psychiatric/Behavioral: Negative for depression, anxiety, suicidal ideas, insomnia and memory loss.      Exam:  Blood pressure 122/72, pulse 76, temperature 36.6 °C (97.8 °F), resp. rate 20, height 1.727 m (5' 8\"), weight 86.7 kg (191 lb 3.2 oz), last menstrual period 08/12/2018, SpO2 96 %.    General:  Well nourished, well developed in NAD  Head is grossly normal.  Neck: Supple without JVD   Pulmonary:  Normal effort.   Cardiovascular: Regular rate  Extremities: no clubbing, cyanosis, or edema.  Psych: affect appropriate  ABD: + BS, NTND, No HSM.  No increase in tenderness with crunch.  Distribution of pain is at the level of the bra line.      Please note that this dictation was created using voice recognition software. I have made every reasonable attempt to correct obvious errors, but I expect that there are errors of grammar and " possibly content that I did not discover before finalizing the note.    Assessment/Plan:  1. Need for vaccination  - TDAP VACCINE =>6YO IM    2. RUQ abdominal pain  Will r/o any biliary disease.  Repeat US, could consider HIDA.  Suspect this is MSK rather than intra-abdominal.    - US-ABDOMEN COMPLETE SURVEY; Future  - COMP METABOLIC PANEL; Future    3. Neuropathy, intercostal nerve  - REFERRAL TO CHIROPRACTIC    4. Anxiety and depression  PHQ 9 score 13.  She has both mood and gut symptoms with hx of Depression/anxiety.  Restart zoloft 50 mg.  Ativan for panic reordered per patient request.  Discussed risk and benefit of this medication including risk of dependency and death from overdose.  reviewed Last refill was nearly 1 year ago for quantity of 30.      - sertraline (ZOLOFT) 50 MG Tab; Take 1 Tab by mouth every day.  Dispense: 90 Tab; Refill: 3  - LORazepam (ATIVAN) 1 MG Tab; Take 1 Tab by mouth every 8 hours as needed for Anxiety for up to 30 days.  Dispense: 30 Tab; Refill: 0    1 month follow up

## 2018-08-21 ENCOUNTER — TELEPHONE (OUTPATIENT)
Dept: MEDICAL GROUP | Facility: PHYSICIAN GROUP | Age: 38
End: 2018-08-21

## 2018-09-01 ENCOUNTER — HOSPITAL ENCOUNTER (OUTPATIENT)
Dept: RADIOLOGY | Facility: MEDICAL CENTER | Age: 38
End: 2018-09-01
Attending: FAMILY MEDICINE
Payer: COMMERCIAL

## 2018-09-01 DIAGNOSIS — R10.11 RUQ ABDOMINAL PAIN: ICD-10-CM

## 2018-09-01 PROCEDURE — 76700 US EXAM ABDOM COMPLETE: CPT

## 2018-09-04 ENCOUNTER — TELEPHONE (OUTPATIENT)
Dept: MEDICAL GROUP | Facility: PHYSICIAN GROUP | Age: 38
End: 2018-09-04

## 2018-09-04 NOTE — TELEPHONE ENCOUNTER
----- Message from Mahsa La M.D. sent at 9/4/2018 10:21 AM PDT -----  Normal results.  Keep follow up appointment.

## 2018-10-10 ENCOUNTER — OFFICE VISIT (OUTPATIENT)
Dept: MEDICAL GROUP | Facility: PHYSICIAN GROUP | Age: 38
End: 2018-10-10
Payer: COMMERCIAL

## 2018-10-10 VITALS
TEMPERATURE: 97.7 F | DIASTOLIC BLOOD PRESSURE: 82 MMHG | WEIGHT: 187 LBS | HEART RATE: 86 BPM | OXYGEN SATURATION: 96 % | SYSTOLIC BLOOD PRESSURE: 120 MMHG | HEIGHT: 68 IN | RESPIRATION RATE: 16 BRPM | BODY MASS INDEX: 28.34 KG/M2

## 2018-10-10 DIAGNOSIS — F33.1 MODERATE EPISODE OF RECURRENT MAJOR DEPRESSIVE DISORDER (HCC): ICD-10-CM

## 2018-10-10 DIAGNOSIS — F41.9 ANXIETY: ICD-10-CM

## 2018-10-10 DIAGNOSIS — R10.11 RUQ PAIN: ICD-10-CM

## 2018-10-10 PROCEDURE — 99214 OFFICE O/P EST MOD 30 MIN: CPT | Performed by: FAMILY MEDICINE

## 2018-10-10 RX ORDER — HYDROXYZINE 50 MG/1
25-50 TABLET, FILM COATED ORAL 3 TIMES DAILY PRN
Qty: 90 TAB | Refills: 0 | Status: SHIPPED | OUTPATIENT
Start: 2018-10-10 | End: 2019-02-07

## 2018-10-10 RX ORDER — FLUOXETINE HYDROCHLORIDE 20 MG/1
20 CAPSULE ORAL DAILY
Qty: 30 CAP | Refills: 5 | Status: SHIPPED | OUTPATIENT
Start: 2018-10-10 | End: 2019-02-07 | Stop reason: SDUPTHER

## 2018-10-10 NOTE — PROGRESS NOTES
Chief Complaint   Patient presents with   • Anxiety     med fv ;lorazepam, sertraline   • Knee Pain     rt knee pain   • Depression     med fv ;lorazepam, sertraline       HISTORY OF PRESENT ILLNESS: Patient is a 38 y.o. female established patient here today for the following concerns:    1. RUQ pain  Here for follow-up on right upper quadrant abdominal pain that is not been associated particularly with food, or positional changes.  Previously had been told she had fatty liver disease.  She is here today to review the ultrasound.  This demonstrated no evidence of fatty liver disease or biliary disease including no evidence of cholelithiasis.  She reports that the pain is about the same.  She was unable to go to the chiropractor to be evaluated for possible musculoskeletal causes.  Reports that she continues with intermittent diarrhea and constipation alternating.  She continues to work on the [employer] diet.  In the past she has tried antacids and acid reducing medications without any improvement in her symptoms.    2. Moderate episode of recurrent major depressive disorder (HCC)  3. Anxiety    Continues to struggle with difficulty with anxiety and depression.  She was started on Zoloft which she had successfully used prior to pregnancy and with some postpartum depression.  She reports this time her mental fogginess, concentration and energy level and overall sense of well-being has been very poor.  She has not responded to the 50 mg of Zoloft over the last 8 weeks.  She has continued to use Lorazepam a couple times per week typically after work or in the evenings but will on occasion and need to take it prior to going to work Monday morning.          Past Medical, Social, and Family history reviewed and updated in EPIC    Allergies:Patient has no known allergies.    Current Outpatient Prescriptions   Medication Sig Dispense Refill   • levonorgestrel (MIRENA, 52 MG,) 20 MCG/24HR IUD 1 Each by Intrauterine route  Once.     • FLUoxetine (PROZAC) 20 MG Cap Take 1 Cap by mouth every day. 30 Cap 5   • hydrOXYzine HCl (ATARAX) 50 MG Tab Take 0.5-1 Tabs by mouth 3 times a day as needed for Anxiety. 90 Tab 0   • Omega-3 Fatty Acids (FISH OIL) 1000 MG Cap capsule Take 1,000 mg by mouth 3 times a day, with meals.     • lidocaine viscous 2% (XYLOCAINE) 2 % Solution Take 15 mL by mouth as needed for Throat/Mouth Pain. Gargle and spit every 4 hours as needed. (Patient not taking: Reported on 8/17/2018) 120 mL 0   • erythromycin 5 MG/GM Ointment Place 1 Application in right eye 4 times a day. (Patient not taking: Reported on 8/17/2018) 1 Tube 1   • ciprofloxacin (CILOXIN) 0.3 % Solution Place 1 Drop in both eyes every 4 hours. (Patient not taking: Reported on 8/17/2018) 1 Bottle 0   • cyclobenzaprine (FLEXERIL) 10 MG Tab 1/2 to 1 tablet at bedtime prn spasms.  No driving. Caution drowsy (Patient not taking: Reported on 8/17/2018) 10 Tab 0     No current facility-administered medications for this visit.          ROS:  Review of Systems   Constitutional: Negative for fever, chills, weight loss and malaise/fatigue.   HENT: Negative for ear pain, nosebleeds, congestion, sore throat and neck pain.    Eyes: Negative for blurred vision.   Respiratory: Negative for cough, sputum production, shortness of breath and wheezing.    Cardiovascular: Negative for chest pain, palpitations,  and leg swelling.   Gastrointestinal: Negative for heartburn, nausea, vomiting, diarrhea and abdominal pain.   Genitourinary: Negative for dysuria, urgency and frequency.   Musculoskeletal: Negative for myalgias, back pain and joint pain.   Skin: Negative for rash and itching.   Neurological: Negative for dizziness, tingling, tremors, sensory change, focal weakness and headaches.   Endo/Heme/Allergies: Does not bruise/bleed easily.   Psychiatric/Behavioral: Negative for depression, anxiety, suicidal ideas, insomnia and memory loss.      Exam:  Blood pressure 120/82,  "pulse 86, temperature 36.5 °C (97.7 °F), resp. rate 16, height 1.727 m (5' 8\"), weight 84.8 kg (187 lb), last menstrual period 10/08/2018, SpO2 96 %, not currently breastfeeding.    General:  Well nourished, well developed in NAD  Head is grossly normal.  Neck: Supple without JVD   Pulmonary:  Normal effort.   Cardiovascular: Regular rate  Extremities: no clubbing, cyanosis, or edema.  Psych: affect appropriate  Abdomen: positive bowel sounds.  Right upper quadrant with mild tenderness, non distended, no hepatosplenomegaly.       Please note that this dictation was created using voice recognition software. I have made every reasonable attempt to correct obvious errors, but I expect that there are errors of grammar and possibly content that I did not discover before finalizing the note.    Assessment/Plan:  1. RUQ pain  We will obtain a HIDA scan to rule out biliary dysfunction, should this be normal I would like to pursue musculoskeletal evaluation, either with physical therapy or chiropractic care.  In addition we do need to still work on her irregularity of her bowels.  Help with her mental health may also improve her bowel symptoms.  - NM-BILIARY (HIDA) SCAN WITH CCK; Future    2. Moderate episode of recurrent major depressive disorder (HCC)  Discontinue Zoloft.  We will do a 2-week trial off of Lorazepam and see if her cognition improves.  Should she still have anxiety not controlled by hydroxyzine, she will go ahead and start fluoxetine to improve mood and anxiety.  - FLUoxetine (PROZAC) 20 MG Cap; Take 1 Cap by mouth every day.  Dispense: 30 Cap; Refill: 5    3. Anxiety  Discontinue lorazepam  - hydrOXYzine HCl (ATARAX) 50 MG Tab; Take 0.5-1 Tabs by mouth 3 times a day as needed for Anxiety.  Dispense: 90 Tab; Refill: 0    Follow-up in 1 month        "

## 2018-10-22 ENCOUNTER — HOSPITAL ENCOUNTER (OUTPATIENT)
Dept: RADIOLOGY | Facility: MEDICAL CENTER | Age: 38
End: 2018-10-22
Attending: FAMILY MEDICINE
Payer: COMMERCIAL

## 2018-10-22 DIAGNOSIS — R10.11 RUQ PAIN: ICD-10-CM

## 2018-10-22 PROCEDURE — A9537 TC99M MEBROFENIN: HCPCS

## 2019-02-07 ENCOUNTER — HOSPITAL ENCOUNTER (OUTPATIENT)
Dept: RADIOLOGY | Facility: MEDICAL CENTER | Age: 39
End: 2019-02-07
Attending: FAMILY MEDICINE
Payer: COMMERCIAL

## 2019-02-07 ENCOUNTER — OFFICE VISIT (OUTPATIENT)
Dept: MEDICAL GROUP | Facility: PHYSICIAN GROUP | Age: 39
End: 2019-02-07
Payer: COMMERCIAL

## 2019-02-07 VITALS
RESPIRATION RATE: 14 BRPM | TEMPERATURE: 98.1 F | HEIGHT: 68 IN | HEART RATE: 82 BPM | OXYGEN SATURATION: 96 % | DIASTOLIC BLOOD PRESSURE: 72 MMHG | WEIGHT: 185 LBS | SYSTOLIC BLOOD PRESSURE: 112 MMHG | BODY MASS INDEX: 28.04 KG/M2

## 2019-02-07 DIAGNOSIS — M54.12 CERVICAL RADICULOPATHY: ICD-10-CM

## 2019-02-07 DIAGNOSIS — R53.83 FATIGUE, UNSPECIFIED TYPE: ICD-10-CM

## 2019-02-07 DIAGNOSIS — F33.1 MODERATE EPISODE OF RECURRENT MAJOR DEPRESSIVE DISORDER (HCC): ICD-10-CM

## 2019-02-07 DIAGNOSIS — Z13.29 SCREENING FOR ENDOCRINE DISORDER: ICD-10-CM

## 2019-02-07 DIAGNOSIS — L65.9 ALOPECIA: ICD-10-CM

## 2019-02-07 DIAGNOSIS — Z13.6 SCREENING FOR CARDIOVASCULAR CONDITION: ICD-10-CM

## 2019-02-07 PROCEDURE — 72050 X-RAY EXAM NECK SPINE 4/5VWS: CPT

## 2019-02-07 PROCEDURE — 99214 OFFICE O/P EST MOD 30 MIN: CPT | Performed by: FAMILY MEDICINE

## 2019-02-07 RX ORDER — FLUOXETINE HYDROCHLORIDE 40 MG/1
40 CAPSULE ORAL DAILY
Qty: 90 CAP | Refills: 3 | Status: SHIPPED | OUTPATIENT
Start: 2019-02-07 | End: 2019-08-21

## 2019-02-07 ASSESSMENT — PATIENT HEALTH QUESTIONNAIRE - PHQ9: CLINICAL INTERPRETATION OF PHQ2 SCORE: 0

## 2019-02-07 NOTE — PROGRESS NOTES
Chief Complaint   Patient presents with   • Back Pain     fv        HISTORY OF PRESENT ILLNESS: Patient is a 38 y.o. female established patient here today for the following concerns:    1. Moderate episode of recurrent major depressive disorder (HCC)  Here for follow up on depression.  Reports that the fluoxetine seems to be helping more than the sertraline did.  She does not quite feel as happy, but feels focus and mental clarity is better.  Could not tolerate the hydroxyzine.  Is interested in maybe increasing her dose to see if there is more affect on her mood.  No side effects noted.  Denies any SI/HI.     2. Cervical radiculopathy  Reports long standing history of neck pain.  Reports a couple of accidents in childhood with possible disc herniation in her lumbar spine.  She reports that the neck often feels tight, and on the verge of spasm.  She now has had some new pain into the right shoulder blade some that is more aching and burning.  She reports that both arms are feeling heavy and weak at times.  She has her  try to massage the neck and thoracic spine with minimal improvement. If it is very painful she will take some aleve.  Not using anything on a regular basis.      3. Fatigue, unspecified type  4. Alopecia  Concerned that in the last couple months has had worsening generalized fatigue as if she cannot get enough sleep.  She reports that sometimes has difficulty with restless nights, but when she does sleep it is restorative.  She has noted overall achiness, fatigue and some global hair loss thinning and dryness of the skin.  No rashes.  No joint swelling.      5. Screening for endocrine disorder  6. Screening for cardiovascular condition  Due for labs.       Past Medical, Social, and Family history reviewed and updated in EPIC    Allergies:Patient has no known allergies.    Current Outpatient Prescriptions   Medication Sig Dispense Refill   • FLUoxetine (PROZAC) 40 MG capsule Take 1 Cap by mouth  "every day. 90 Cap 3   • levonorgestrel (MIRENA, 52 MG,) 20 MCG/24HR IUD 1 Each by Intrauterine route Once.       No current facility-administered medications for this visit.          ROS:  Review of Systems   Constitutional: Negative for fever, chills, weight loss and malaise/fatigue.   HENT: Negative for ear pain, nosebleeds, congestion, sore throat and +neck pain.    Eyes: Negative for blurred vision.   Respiratory: Negative for cough, sputum production, shortness of breath and wheezing.    Cardiovascular: Negative for chest pain, palpitations,  and leg swelling.   Gastrointestinal: Negative for heartburn, nausea, vomiting, diarrhea and abdominal pain.   Genitourinary: Negative for dysuria, urgency and frequency.   Musculoskeletal: Negative for myalgias, back pain and joint pain.   Skin: Negative for rash and itching.   Neurological: Negative for dizziness, tingling, tremors, sensory change, focal weakness and headaches.   Endo/Heme/Allergies: Does not bruise/bleed easily.   Psychiatric/Behavioral: +depression, anxiety, suicidal ideas,+ insomnia and no memory loss.      Exam:  Blood pressure 112/72, pulse 82, temperature 36.7 °C (98.1 °F), resp. rate 14, height 1.727 m (5' 8\"), weight 83.9 kg (185 lb), SpO2 96 %, not currently breastfeeding.    General:  Well nourished, well developed in NAD  Head is grossly normal.  Neck: Supple without JVD   Pulmonary:  Normal effort.   Cardiovascular: Regular rate  Extremities: no clubbing, cyanosis, or edema.  Psych: affect appropriate      Please note that this dictation was created using voice recognition software. I have made every reasonable attempt to correct obvious errors, but I expect that there are errors of grammar and possibly content that I did not discover before finalizing the note.    Assessment/Plan:  1. Moderate episode of recurrent major depressive disorder (HCC)  Increase to   - FLUoxetine (PROZAC) 40 MG capsule; Take 1 Cap by mouth every day.  Dispense: 90 " Cap; Refill: 3    2. Cervical radiculopathy  Will obtain xrays, start PT.  Aleve ok to use, suspect area in the thoracic back is actually referred from the neck.  (does have gallbladder slight dysfunction with borderline low EF on HIDA)  - DX-CERVICAL SPINE-4+ VIEWS; Future  - REFERRAL TO PHYSICAL THERAPY Reason for Therapy: Eval/Treat/Report    3. Fatigue, unspecified type  Check for other causes.  Possibly depression related.  See if improves with increase in fluoxetine dosing.   - CBC WITH DIFFERENTIAL; Future  - Comp Metabolic Panel; Future  - FERRITIN; Future  - IRON/TOTAL IRON BIND; Future  - VITAMIN B12; Future  - FOLATE; Future  - TSH; Future  - FREE THYROXINE; Future  - TRIIDOTHYRONINE; Future    4. Alopecia  Check   - CBC WITH DIFFERENTIAL; Future  - Comp Metabolic Panel; Future  - FERRITIN; Future  - IRON/TOTAL IRON BIND; Future  - VITAMIN B12; Future  - FOLATE; Future  - TSH; Future  - FREE THYROXINE; Future  - TRIIDOTHYRONINE; Future    5. Screening for endocrine disorder  - VITAMIN D,25 HYDROXY; Future    6. Screening for cardiovascular condition  - Comp Metabolic Panel; Future  - Lipid Profile; Future    1 month follow up

## 2019-02-09 ENCOUNTER — HOSPITAL ENCOUNTER (OUTPATIENT)
Dept: LAB | Facility: MEDICAL CENTER | Age: 39
End: 2019-02-09
Attending: FAMILY MEDICINE
Payer: COMMERCIAL

## 2019-02-09 DIAGNOSIS — R53.83 FATIGUE, UNSPECIFIED TYPE: ICD-10-CM

## 2019-02-09 DIAGNOSIS — L65.9 ALOPECIA: ICD-10-CM

## 2019-02-09 DIAGNOSIS — Z13.29 SCREENING FOR ENDOCRINE DISORDER: ICD-10-CM

## 2019-02-09 DIAGNOSIS — Z13.6 SCREENING FOR CARDIOVASCULAR CONDITION: ICD-10-CM

## 2019-02-09 LAB
25(OH)D3 SERPL-MCNC: 36 NG/ML (ref 30–100)
ALBUMIN SERPL BCP-MCNC: 4.4 G/DL (ref 3.2–4.9)
ALBUMIN/GLOB SERPL: 1.2 G/DL
ALP SERPL-CCNC: 49 U/L (ref 30–99)
ALT SERPL-CCNC: 13 U/L (ref 2–50)
ANION GAP SERPL CALC-SCNC: 7 MMOL/L (ref 0–11.9)
AST SERPL-CCNC: 17 U/L (ref 12–45)
BASOPHILS # BLD AUTO: 0.6 % (ref 0–1.8)
BASOPHILS # BLD: 0.04 K/UL (ref 0–0.12)
BILIRUB SERPL-MCNC: 0.4 MG/DL (ref 0.1–1.5)
BUN SERPL-MCNC: 14 MG/DL (ref 8–22)
CALCIUM SERPL-MCNC: 9.4 MG/DL (ref 8.5–10.5)
CHLORIDE SERPL-SCNC: 107 MMOL/L (ref 96–112)
CHOLEST SERPL-MCNC: 179 MG/DL (ref 100–199)
CO2 SERPL-SCNC: 26 MMOL/L (ref 20–33)
CREAT SERPL-MCNC: 0.82 MG/DL (ref 0.5–1.4)
EOSINOPHIL # BLD AUTO: 0.16 K/UL (ref 0–0.51)
EOSINOPHIL NFR BLD: 2.4 % (ref 0–6.9)
ERYTHROCYTE [DISTWIDTH] IN BLOOD BY AUTOMATED COUNT: 45.2 FL (ref 35.9–50)
FERRITIN SERPL-MCNC: 53.3 NG/ML (ref 10–291)
FOLATE SERPL-MCNC: 8.8 NG/ML
GLOBULIN SER CALC-MCNC: 3.7 G/DL (ref 1.9–3.5)
GLUCOSE SERPL-MCNC: 89 MG/DL (ref 65–99)
HCT VFR BLD AUTO: 42.2 % (ref 37–47)
HDLC SERPL-MCNC: 58 MG/DL
HGB BLD-MCNC: 13.8 G/DL (ref 12–16)
IMM GRANULOCYTES # BLD AUTO: 0.01 K/UL (ref 0–0.11)
IMM GRANULOCYTES NFR BLD AUTO: 0.2 % (ref 0–0.9)
IRON SATN MFR SERPL: 26 % (ref 15–55)
IRON SERPL-MCNC: 88 UG/DL (ref 40–170)
LDLC SERPL CALC-MCNC: 100 MG/DL
LYMPHOCYTES # BLD AUTO: 2.77 K/UL (ref 1–4.8)
LYMPHOCYTES NFR BLD: 42 % (ref 22–41)
MCH RBC QN AUTO: 32.5 PG (ref 27–33)
MCHC RBC AUTO-ENTMCNC: 32.7 G/DL (ref 33.6–35)
MCV RBC AUTO: 99.5 FL (ref 81.4–97.8)
MONOCYTES # BLD AUTO: 0.38 K/UL (ref 0–0.85)
MONOCYTES NFR BLD AUTO: 5.8 % (ref 0–13.4)
NEUTROPHILS # BLD AUTO: 3.23 K/UL (ref 2–7.15)
NEUTROPHILS NFR BLD: 49 % (ref 44–72)
NRBC # BLD AUTO: 0 K/UL
NRBC BLD-RTO: 0 /100 WBC
PLATELET # BLD AUTO: 260 K/UL (ref 164–446)
PMV BLD AUTO: 10.4 FL (ref 9–12.9)
POTASSIUM SERPL-SCNC: 4.4 MMOL/L (ref 3.6–5.5)
PROT SERPL-MCNC: 8.1 G/DL (ref 6–8.2)
RBC # BLD AUTO: 4.24 M/UL (ref 4.2–5.4)
SODIUM SERPL-SCNC: 140 MMOL/L (ref 135–145)
T3 SERPL-MCNC: 91.2 NG/DL (ref 60–181)
T4 FREE SERPL-MCNC: 0.74 NG/DL (ref 0.53–1.43)
TIBC SERPL-MCNC: 344 UG/DL (ref 250–450)
TRIGL SERPL-MCNC: 103 MG/DL (ref 0–149)
TSH SERPL DL<=0.005 MIU/L-ACNC: 1.48 UIU/ML (ref 0.38–5.33)
VIT B12 SERPL-MCNC: 508 PG/ML (ref 211–911)
WBC # BLD AUTO: 6.6 K/UL (ref 4.8–10.8)

## 2019-02-09 PROCEDURE — 80061 LIPID PANEL: CPT

## 2019-02-09 PROCEDURE — 82607 VITAMIN B-12: CPT

## 2019-02-09 PROCEDURE — 84480 ASSAY TRIIODOTHYRONINE (T3): CPT

## 2019-02-09 PROCEDURE — 84439 ASSAY OF FREE THYROXINE: CPT

## 2019-02-09 PROCEDURE — 85025 COMPLETE CBC W/AUTO DIFF WBC: CPT

## 2019-02-09 PROCEDURE — 80053 COMPREHEN METABOLIC PANEL: CPT

## 2019-02-09 PROCEDURE — 36415 COLL VENOUS BLD VENIPUNCTURE: CPT

## 2019-02-09 PROCEDURE — 83540 ASSAY OF IRON: CPT

## 2019-02-09 PROCEDURE — 82746 ASSAY OF FOLIC ACID SERUM: CPT

## 2019-02-09 PROCEDURE — 82728 ASSAY OF FERRITIN: CPT

## 2019-02-09 PROCEDURE — 83550 IRON BINDING TEST: CPT

## 2019-02-09 PROCEDURE — 82306 VITAMIN D 25 HYDROXY: CPT

## 2019-02-09 PROCEDURE — 84443 ASSAY THYROID STIM HORMONE: CPT

## 2019-03-25 ENCOUNTER — APPOINTMENT (OUTPATIENT)
Dept: PHYSICAL THERAPY | Facility: REHABILITATION | Age: 39
End: 2019-03-25
Payer: COMMERCIAL

## 2019-03-27 ENCOUNTER — APPOINTMENT (OUTPATIENT)
Dept: PHYSICAL THERAPY | Facility: REHABILITATION | Age: 39
End: 2019-03-27
Payer: COMMERCIAL

## 2019-04-01 ENCOUNTER — APPOINTMENT (OUTPATIENT)
Dept: PHYSICAL THERAPY | Facility: REHABILITATION | Age: 39
End: 2019-04-01
Payer: COMMERCIAL

## 2019-04-03 ENCOUNTER — APPOINTMENT (OUTPATIENT)
Dept: PHYSICAL THERAPY | Facility: REHABILITATION | Age: 39
End: 2019-04-03
Payer: COMMERCIAL

## 2019-04-08 ENCOUNTER — APPOINTMENT (OUTPATIENT)
Dept: PHYSICAL THERAPY | Facility: REHABILITATION | Age: 39
End: 2019-04-08
Payer: COMMERCIAL

## 2019-04-10 ENCOUNTER — APPOINTMENT (OUTPATIENT)
Dept: PHYSICAL THERAPY | Facility: REHABILITATION | Age: 39
End: 2019-04-10
Payer: COMMERCIAL

## 2019-04-15 ENCOUNTER — APPOINTMENT (OUTPATIENT)
Dept: PHYSICAL THERAPY | Facility: REHABILITATION | Age: 39
End: 2019-04-15
Payer: COMMERCIAL

## 2019-04-17 ENCOUNTER — APPOINTMENT (OUTPATIENT)
Dept: PHYSICAL THERAPY | Facility: REHABILITATION | Age: 39
End: 2019-04-17
Payer: COMMERCIAL

## 2019-04-19 ENCOUNTER — HOSPITAL ENCOUNTER (OUTPATIENT)
Facility: MEDICAL CENTER | Age: 39
End: 2019-04-19
Attending: FAMILY MEDICINE
Payer: COMMERCIAL

## 2019-04-19 ENCOUNTER — OFFICE VISIT (OUTPATIENT)
Dept: MEDICAL GROUP | Facility: PHYSICIAN GROUP | Age: 39
End: 2019-04-19
Payer: COMMERCIAL

## 2019-04-19 VITALS
DIASTOLIC BLOOD PRESSURE: 74 MMHG | TEMPERATURE: 98.4 F | WEIGHT: 185 LBS | HEIGHT: 68 IN | BODY MASS INDEX: 28.04 KG/M2 | RESPIRATION RATE: 14 BRPM | HEART RATE: 78 BPM | SYSTOLIC BLOOD PRESSURE: 110 MMHG | OXYGEN SATURATION: 98 %

## 2019-04-19 DIAGNOSIS — F41.9 ANXIETY AND DEPRESSION: ICD-10-CM

## 2019-04-19 DIAGNOSIS — F32.A ANXIETY AND DEPRESSION: ICD-10-CM

## 2019-04-19 DIAGNOSIS — Z12.4 SCREENING FOR CERVICAL CANCER: ICD-10-CM

## 2019-04-19 DIAGNOSIS — Z01.419 WELL WOMAN EXAM: ICD-10-CM

## 2019-04-19 DIAGNOSIS — N76.0 VULVOVAGINITIS: ICD-10-CM

## 2019-04-19 PROCEDURE — 88175 CYTOPATH C/V AUTO FLUID REDO: CPT

## 2019-04-19 PROCEDURE — 87624 HPV HI-RISK TYP POOLED RSLT: CPT

## 2019-04-19 PROCEDURE — 99395 PREV VISIT EST AGE 18-39: CPT | Performed by: FAMILY MEDICINE

## 2019-04-19 RX ORDER — FLUCONAZOLE 150 MG/1
150 TABLET ORAL DAILY
Qty: 2 TAB | Refills: 0 | Status: SHIPPED | OUTPATIENT
Start: 2019-04-19 | End: 2019-08-12

## 2019-04-19 RX ORDER — LORAZEPAM 1 MG/1
1 TABLET ORAL EVERY 8 HOURS PRN
Qty: 30 TAB | Refills: 0 | Status: SHIPPED
Start: 2019-04-19 | End: 2019-05-19

## 2019-04-19 NOTE — PROGRESS NOTES
Chief Complaint   Patient presents with   • Gynecologic Exam   • Anxiety     lorazepam refill       HISTORY OF PRESENT ILLNESS: Patient is a 38 y.o. female est patient who presents today to discuss the following issues:    1. Well woman exam  Here for well woman exam.  Has been experiencing some burning and itching.  No increase in discharge per se and no change in odor.  Has IUD for contraception which was placed nearly 4 years ago.  She has hx of cervical dysplasia in her 20s that was treated with cryotherapy, follow up paps were normal.  Last pap was about 4 years ago.  No family hx of breast or ovarian cancer.     She is still struggling with anxiety.  Was given hydroxyzine after she was hoping to not take lorazepam.  This has not been as effective and requests for another refill on lorazepam that she uses very very sparingly.        Active Ambulatory Problems     Diagnosis Date Noted   • RUQ pain 10/10/2018     Resolved Ambulatory Problems     Diagnosis Date Noted   • No Resolved Ambulatory Problems     No Additional Past Medical History       Allergies:Patient has no known allergies.    Current Outpatient Prescriptions   Medication Sig Dispense Refill   • LORazepam (ATIVAN) 1 MG Tab Take 1 Tab by mouth every 8 hours as needed for Anxiety for up to 30 days. 30 Tab 0   • fluconazole (DIFLUCAN) 150 MG tablet Take 1 Tab by mouth every day. May repeat in 2 days 2 Tab 0   • FLUoxetine (PROZAC) 40 MG capsule Take 1 Cap by mouth every day. 90 Cap 3   • levonorgestrel (MIRENA, 52 MG,) 20 MCG/24HR IUD 1 Each by Intrauterine route Once.       No current facility-administered medications for this visit.        Social History   Substance Use Topics   • Smoking status: Former Smoker     Types: Cigarettes   • Smokeless tobacco: Never Used   • Alcohol use 2.4 oz/week     4 Cans of beer per week      Comment: week ends       Family Status   Relation Status   • Mo Alive   • Fa Alive   • MGFa      Family History  "  Problem Relation Age of Onset   • COPD Father    • Heart Failure Maternal Grandfather      Health Maintenance Due   Topic Date Due   • IMM PNEUMOCOCCAL 19-64 (ADULT) MEDIUM RISK SERIES (1 of 1 - PPSV23) 05/04/1998   • PAP SMEAR  05/04/2000       ROS:  Review of Systems   Constitutional: Negative for fever, chills, weight loss and malaise/fatigue.   HENT: Negative for ear pain, nosebleeds, congestion, sore throat and neck pain.    Eyes: Negative for blurred vision.   Respiratory: Negative for cough, sputum production, shortness of breath and wheezing.    Cardiovascular: Negative for chest pain, palpitations, orthopnea and leg swelling.   Gastrointestinal: Negative for heartburn, nausea, vomiting and abdominal pain.   Genitourinary: Negative for dysuria, urgency and frequency.   Musculoskeletal: Negative for myalgias, back pain and joint pain.   Skin: Negative for rash and itching.   Neurological: Negative for dizziness, tingling, tremors, sensory change, focal weakness and headaches.   Endo/Heme/Allergies: Does not bruise/bleed easily.   Psychiatric/Behavioral: Negative for depression, suicidal ideas and memory loss.  The patient is not nervous/anxious and does not have insomnia.      Exam:  /74   Pulse 78   Temp 36.9 °C (98.4 °F)   Resp 14   Ht 1.727 m (5' 8\")   Wt 83.9 kg (185 lb)   SpO2 98%   General:  Well nourished, well developed female in NAD  HEENT: Normocephalic and atraumatic. TMs clear bilaterally. Oropharynx is clear      without erythema and no tonsillar exudate. Nasal mucosa pink with minimal      Rhinorrhea.  EYES: EOMI.  Conjunctiva clear.    Neck: Supple without JVD or bruit. Thyroid is not enlarged.  Pulmonary: Clear to ausculation and percussion.  Normal effort. No rales, ronchi, or wheezing.  Cardiovascular: Regular rate and rhythm without murmur, no peripheral edema  Abdomen: positive bowel sounds.  Non tender, non distended, no hepatosplenomegaly.   MSK: normal ROM in upper and " lower extremities bilaterally  Psych: appropriate affect and concentration, oriented to person and place  Neuro: no unilateral weakness in upper or lower extremities.  No gait disturbance  Breasts: normal appearance, no skin changes, no masses, nipple discharge, or LAD  External genitalia without lesions  Vaginal mucosa pink and well rugated  White adherent cervical discharge  Pap obtained    Bimanual exam shows normal positioned and sized uterus with no adnexal tenderness or masses      Please note that this dictation was created using voice recognition software. I have made every reasonable attempt to correct obvious errors, but I expect that there are errors of grammar and possibly content that I did not discover before finalizing the note.    Assessment/Plan:  1. Well woman exam  Continue healthy lifestyle  Continue regular exercise  Maintain normal weight  Follow PAP, mammogram next year,   Will need gyn appointment for IUD replacement next year.     2. Anxiety and depression  - LORazepam (ATIVAN) 1 MG Tab; Take 1 Tab by mouth every 8 hours as needed for Anxiety for up to 30 days.  Dispense: 30 Tab; Refill: 0    3. Screening for cervical cancer  - THINPREP PAP WITH HPV; Future    4. Vulvovaginitis  - fluconazole (DIFLUCAN) 150 MG tablet; Take 1 Tab by mouth every day. May repeat in 2 days  Dispense: 2 Tab; Refill: 0

## 2019-04-23 LAB
CYTOLOGY REG CYTOL: NORMAL
HPV HR 12 DNA CVX QL NAA+PROBE: NEGATIVE
HPV16 DNA SPEC QL NAA+PROBE: NEGATIVE
HPV18 DNA SPEC QL NAA+PROBE: NEGATIVE
SPECIMEN SOURCE: NORMAL

## 2019-06-13 ENCOUNTER — OFFICE VISIT (OUTPATIENT)
Dept: URGENT CARE | Facility: PHYSICIAN GROUP | Age: 39
End: 2019-06-13
Payer: COMMERCIAL

## 2019-06-13 VITALS
RESPIRATION RATE: 14 BRPM | HEART RATE: 83 BPM | DIASTOLIC BLOOD PRESSURE: 80 MMHG | BODY MASS INDEX: 27.28 KG/M2 | SYSTOLIC BLOOD PRESSURE: 110 MMHG | HEIGHT: 68 IN | WEIGHT: 180 LBS | TEMPERATURE: 98 F | OXYGEN SATURATION: 97 %

## 2019-06-13 DIAGNOSIS — R10.31 RIGHT INGUINAL PAIN: ICD-10-CM

## 2019-06-13 PROCEDURE — 99214 OFFICE O/P EST MOD 30 MIN: CPT | Performed by: PHYSICIAN ASSISTANT

## 2019-06-13 ASSESSMENT — ENCOUNTER SYMPTOMS
FEVER: 0
HIP PAIN: 1
MYALGIAS: 1
CHILLS: 0
ABDOMINAL PAIN: 0
FATIGUE: 0
ARTHRALGIAS: 1

## 2019-06-14 NOTE — PROGRESS NOTES
"Subjective:   Mattie Hicks is a 39 y.o. female who presents for Hip Pain (tuesday poss pinched nerve)        Pt complains of right inguinal pain x 2 days. She does not recall an injury.  Pain is deep and aching.  It radiates around the hip and to her back.  When this occurs she feels as if her back \"will lock up.\"  She denies prior symptoms of this nature. Heat, NSAIDs have not helped.  Pt does not tolerate prednisone well.      Hip Pain   This is a new problem. The current episode started in the past 7 days. The problem occurs constantly. The problem has been gradually worsening. Associated symptoms include arthralgias and myalgias. Pertinent negatives include no abdominal pain, chills, fatigue or fever. Nothing (extension) aggravates the symptoms. She has tried NSAIDs, heat and ice for the symptoms.     Review of Systems   Constitutional: Negative for chills, fatigue and fever.   Gastrointestinal: Negative for abdominal pain.   Musculoskeletal: Positive for arthralgias and myalgias.        PMH: no relevant medical historyMEDS:   Current Outpatient Prescriptions:   •  FLUoxetine (PROZAC) 40 MG capsule, Take 1 Cap by mouth every day., Disp: 90 Cap, Rfl: 3  •  fluconazole (DIFLUCAN) 150 MG tablet, Take 1 Tab by mouth every day. May repeat in 2 days, Disp: 2 Tab, Rfl: 0  •  levonorgestrel (MIRENA, 52 MG,) 20 MCG/24HR IUD, 1 Each by Intrauterine route Once., Disp: , Rfl:   ALLERGIES: No Known Allergies  SURGHX:   Past Surgical History:   Procedure Laterality Date   • PRIMARY C SECTION       SOCHX:  reports that she has quit smoking. Her smoking use included Cigarettes. She has never used smokeless tobacco. She reports that she drinks about 2.4 oz of alcohol per week . She reports that she does not use drugs.  FH: Family history was reviewed, no pertinent findings to report   Objective:   /80   Pulse 83   Temp 36.7 °C (98 °F) (Temporal)   Resp 14   Ht 1.727 m (5' 8\")   Wt 81.6 kg (180 lb)   SpO2 97%   BMI " 27.37 kg/m²   Physical Exam   Constitutional: She is oriented to person, place, and time. She appears well-developed and well-nourished.  Non-toxic appearance. No distress.   HENT:   Head: Normocephalic and atraumatic.   Right Ear: External ear normal.   Left Ear: External ear normal.   Nose: Nose normal.   Neck: Neck supple.   Pulmonary/Chest: Effort normal. No respiratory distress.   Musculoskeletal:   No bony tenderness of pelvis, sacrum, or lumbar spine. Groin and  Right Hip flexor pain and groin pain elicited with hip extension and abduction. ROM WNL.  Negative FAISAL.  Negative FADIR.  Negative straight leg raise.  Greater trochanter nontender to palpation.   Neurological: She is alert and oriented to person, place, and time. No cranial nerve deficit or sensory deficit.   Neurovascularly intact distally.   Skin: Skin is warm and dry. Capillary refill takes less than 2 seconds.   Psychiatric: She has a normal mood and affect. Her speech is normal and behavior is normal. Judgment and thought content normal. Cognition and memory are normal.   Vitals reviewed.        Assessment/Plan:   1. Right inguinal pain  - REFERRAL TO SPORTS MEDICINE    Patient advised that etiology of symptoms unclear.  Considerations include but not limited to , groin strain, hip flexor tendinitis, peripheral neuropathy, radiculopathy  I would like patient to follow-up with sports medicine for further evaluation.  Patient given copy of referral.  If symptoms worsen or change in nature prior to this appointment patient was instructed to return to clinic for reevaluation.    Differential diagnosis, natural history, supportive care, and indications for immediate follow-up discussed.

## 2019-07-24 ENCOUNTER — OFFICE VISIT (OUTPATIENT)
Dept: URGENT CARE | Facility: PHYSICIAN GROUP | Age: 39
End: 2019-07-24
Payer: COMMERCIAL

## 2019-07-24 VITALS
HEART RATE: 77 BPM | HEIGHT: 68 IN | SYSTOLIC BLOOD PRESSURE: 106 MMHG | DIASTOLIC BLOOD PRESSURE: 68 MMHG | WEIGHT: 186 LBS | OXYGEN SATURATION: 96 % | TEMPERATURE: 97.9 F | BODY MASS INDEX: 28.19 KG/M2 | RESPIRATION RATE: 12 BRPM

## 2019-07-24 DIAGNOSIS — J06.9 UPPER RESPIRATORY TRACT INFECTION, UNSPECIFIED TYPE: ICD-10-CM

## 2019-07-24 PROCEDURE — 99214 OFFICE O/P EST MOD 30 MIN: CPT | Mod: 25 | Performed by: PHYSICIAN ASSISTANT

## 2019-07-24 PROCEDURE — 94640 AIRWAY INHALATION TREATMENT: CPT | Performed by: PHYSICIAN ASSISTANT

## 2019-07-24 RX ORDER — METHYLPREDNISOLONE 4 MG/1
TABLET ORAL
Qty: 1 KIT | Refills: 0 | Status: SHIPPED | OUTPATIENT
Start: 2019-07-24 | End: 2019-08-12

## 2019-07-24 RX ORDER — DEXTROMETHORPHAN HYDROBROMIDE AND PROMETHAZINE HYDROCHLORIDE 15; 6.25 MG/5ML; MG/5ML
SYRUP ORAL
Qty: 180 ML | Refills: 0 | Status: SHIPPED | OUTPATIENT
Start: 2019-07-24 | End: 2019-08-12

## 2019-07-24 RX ORDER — ALBUTEROL SULFATE 2.5 MG/3ML
2.5 SOLUTION RESPIRATORY (INHALATION) ONCE
Status: COMPLETED | OUTPATIENT
Start: 2019-07-24 | End: 2019-07-24

## 2019-07-24 RX ORDER — LORAZEPAM 1 MG/1
1 TABLET ORAL EVERY 4 HOURS PRN
COMMUNITY
End: 2019-08-12

## 2019-07-24 RX ADMIN — ALBUTEROL SULFATE 2.5 MG: 2.5 SOLUTION RESPIRATORY (INHALATION) at 11:58

## 2019-07-24 ASSESSMENT — ENCOUNTER SYMPTOMS
FEVER: 0
SORE THROAT: 1
RHINORRHEA: 1
CHILLS: 0
COUGH: 1
SHORTNESS OF BREATH: 1
MYALGIAS: 0

## 2019-07-24 NOTE — LETTER
July 24, 2019         Patient: Mattie Hicks   YOB: 1980   Date of Visit: 7/24/2019           To Whom it May Concern:    Mattie Hicks was seen in my clinic on 7/24/2019. Please excuse from work on 7/23/19 and 7/24/19.    If you have any questions or concerns, please don't hesitate to call.        Sincerely,           Daria Harris P.A.-C.  Electronically Signed

## 2019-07-24 NOTE — PROGRESS NOTES
"Subjective:      Mattie Hicks is a 39 y.o. female who presents with Cough (headache, sore throat, short of breath x1week)            Cough   This is a new problem. The current episode started in the past 7 days. The problem has been unchanged. The problem occurs every few minutes. The cough is non-productive. Associated symptoms include nasal congestion, postnasal drip, rhinorrhea, a sore throat and shortness of breath. Pertinent negatives include no chest pain, chills, ear congestion, ear pain, fever or myalgias. The symptoms are aggravated by lying down. She has tried nothing for the symptoms. There is no history of asthma.     Past medical history: Is not pertinent to this examination  Past surgical history: Not pertinent to this examination  Family history: Is not pertinent to this examination  Allergies: No known drug allergies  Social history: Is reviewed in [facility]  Current Outpatient Prescriptions on File Prior to Visit   Medication Sig Dispense Refill   • FLUoxetine (PROZAC) 40 MG capsule Take 1 Cap by mouth every day. 90 Cap 3   • levonorgestrel (MIRENA, 52 MG,) 20 MCG/24HR IUD 1 Each by Intrauterine route Once.     • fluconazole (DIFLUCAN) 150 MG tablet Take 1 Tab by mouth every day. May repeat in 2 days (Patient not taking: Reported on 7/24/2019) 2 Tab 0     No current facility-administered medications on file prior to visit.          Review of Systems   Constitutional: Negative for chills and fever.   HENT: Positive for postnasal drip, rhinorrhea and sore throat. Negative for ear pain.    Respiratory: Positive for cough and shortness of breath.    Cardiovascular: Negative for chest pain.   Musculoskeletal: Negative for myalgias.          Objective:     /68 (BP Location: Right arm, Patient Position: Sitting, BP Cuff Size: Adult)   Pulse 77   Temp 36.6 °C (97.9 °F) (Temporal)   Resp 12   Ht 1.727 m (5' 8\")   Wt 84.4 kg (186 lb)   SpO2 96%   BMI 28.28 kg/m²      Physical Exam       Gen.: " Patient is A and O ×3, no acute distress, well-nourished well-hydrated  Vitals: Are listed and unremarkable  HEENT: Heads normocephalic, atraumatic, PERRLA, extraocular movements intact, TMs and oropharynx clear  Neck: Soft supple without cervical lymphadenopathy  Cardiovascular: Regular rate and rhythm normal S1 and S2. No murmurs, rubs or gallops  Lungs are clear to auscultation bilaterally. no wheezes rales or rhonchi  Abdomen is soft, nontender, nondistended with good bowel sounds, no hepatosplenomegaly  Skin: Is well perfused without evidence of rash or lesions  Neurological:  cranial nerves II through XII were assessed and intact.  Musculoskeletal: Full range of motion, 5 out of 5 strength against resistance  Neurovascularly: Intact with a 2 second cap refill, good distal pulses      Urgent CARE course: Albuterol only breathing treatment x1 given.  Patient reassessed and still had clear breath sounds.  Subjectively she said she felt less shortness of breath     Assessment/Plan:     1. Upper respiratory tract infection, unspecified type  Discussed likely viral etiology at length.  Supportive care measures encouraged.  Follow-up as needed if symptoms persist or worsen despite treatment  - promethazine-dextromethorphan (PROMETHAZINE-DM) 6.25-15 MG/5ML syrup; Take 5mls every six hours as needed for cough  Dispense: 180 mL; Refill: 0  - methylPREDNISolone (MEDROL DOSEPAK) 4 MG Tablet Therapy Pack; Take daily according to directions on the packet  Dispense: 1 Kit; Refill: 0  - albuterol (PROVENTIL) 2.5mg/3ml nebulizer solution 2.5 mg; 3 mL by Nebulization route Once.

## 2019-08-12 ENCOUNTER — APPOINTMENT (OUTPATIENT)
Dept: RADIOLOGY | Facility: MEDICAL CENTER | Age: 39
End: 2019-08-12
Attending: EMERGENCY MEDICINE
Payer: COMMERCIAL

## 2019-08-12 ENCOUNTER — HOSPITAL ENCOUNTER (OUTPATIENT)
Facility: MEDICAL CENTER | Age: 39
End: 2019-08-13
Attending: EMERGENCY MEDICINE | Admitting: HOSPITALIST
Payer: COMMERCIAL

## 2019-08-12 DIAGNOSIS — F10.10 ALCOHOL ABUSE: ICD-10-CM

## 2019-08-12 DIAGNOSIS — R42 LIGHTHEADEDNESS: ICD-10-CM

## 2019-08-12 DIAGNOSIS — F41.9 ANXIETY: ICD-10-CM

## 2019-08-12 DIAGNOSIS — R55 NEAR SYNCOPE: ICD-10-CM

## 2019-08-12 DIAGNOSIS — R47.89 WORD FINDING DIFFICULTY: ICD-10-CM

## 2019-08-12 PROBLEM — F32.A DEPRESSION: Status: ACTIVE | Noted: 2019-08-12

## 2019-08-12 PROBLEM — F41.0 PANIC ATTACK: Status: ACTIVE | Noted: 2019-08-12

## 2019-08-12 LAB
ALBUMIN SERPL BCP-MCNC: 3.9 G/DL (ref 3.2–4.9)
ALBUMIN/GLOB SERPL: 1.2 G/DL
ALP SERPL-CCNC: 43 U/L (ref 30–99)
ALT SERPL-CCNC: 17 U/L (ref 2–50)
AMPHETAMINES UR QL SCN: NEGATIVE
ANION GAP SERPL CALC-SCNC: 8 MMOL/L (ref 0–11.9)
APPEARANCE UR: CLEAR
AST SERPL-CCNC: 18 U/L (ref 12–45)
BARBITURATES UR QL SCN: NEGATIVE
BASOPHILS # BLD AUTO: 0.6 % (ref 0–1.8)
BASOPHILS # BLD: 0.05 K/UL (ref 0–0.12)
BENZODIAZ UR QL SCN: NEGATIVE
BILIRUB SERPL-MCNC: 1.5 MG/DL (ref 0.1–1.5)
BILIRUB UR QL STRIP.AUTO: NEGATIVE
BUN SERPL-MCNC: 12 MG/DL (ref 8–22)
CALCIUM SERPL-MCNC: 9.1 MG/DL (ref 8.4–10.2)
CHLORIDE SERPL-SCNC: 104 MMOL/L (ref 96–112)
CO2 SERPL-SCNC: 23 MMOL/L (ref 20–33)
COCAINE UR QL SCN: NEGATIVE
COLOR UR: YELLOW
CREAT SERPL-MCNC: 0.72 MG/DL (ref 0.5–1.4)
D DIMER PPP IA.FEU-MCNC: 0.53 UG/ML (FEU) (ref 0–0.5)
EKG IMPRESSION: NORMAL
EOSINOPHIL # BLD AUTO: 0.07 K/UL (ref 0–0.51)
EOSINOPHIL NFR BLD: 0.8 % (ref 0–6.9)
ERYTHROCYTE [DISTWIDTH] IN BLOOD BY AUTOMATED COUNT: 44.5 FL (ref 35.9–50)
GLOBULIN SER CALC-MCNC: 3.2 G/DL (ref 1.9–3.5)
GLUCOSE SERPL-MCNC: 81 MG/DL (ref 65–99)
GLUCOSE UR STRIP.AUTO-MCNC: NEGATIVE MG/DL
HCG SERPL QL: NEGATIVE
HCT VFR BLD AUTO: 36.4 % (ref 37–47)
HGB BLD-MCNC: 12.1 G/DL (ref 12–16)
IMM GRANULOCYTES # BLD AUTO: 0.02 K/UL (ref 0–0.11)
IMM GRANULOCYTES NFR BLD AUTO: 0.2 % (ref 0–0.9)
KETONES UR STRIP.AUTO-MCNC: 15 MG/DL
LEUKOCYTE ESTERASE UR QL STRIP.AUTO: NEGATIVE
LIPASE SERPL-CCNC: 29 U/L (ref 7–58)
LYMPHOCYTES # BLD AUTO: 2.88 K/UL (ref 1–4.8)
LYMPHOCYTES NFR BLD: 34.6 % (ref 22–41)
MCH RBC QN AUTO: 32 PG (ref 27–33)
MCHC RBC AUTO-ENTMCNC: 33.2 G/DL (ref 33.6–35)
MCV RBC AUTO: 96.3 FL (ref 81.4–97.8)
MICRO URNS: ABNORMAL
MONOCYTES # BLD AUTO: 0.49 K/UL (ref 0–0.85)
MONOCYTES NFR BLD AUTO: 5.9 % (ref 0–13.4)
NEUTROPHILS # BLD AUTO: 4.81 K/UL (ref 2–7.15)
NEUTROPHILS NFR BLD: 57.9 % (ref 44–72)
NITRITE UR QL STRIP.AUTO: NEGATIVE
NRBC # BLD AUTO: 0 K/UL
NRBC BLD-RTO: 0 /100 WBC
OPIATES UR QL SCN: NEGATIVE
PCP UR QL SCN: NEGATIVE
PH UR STRIP.AUTO: 5.5 [PH] (ref 5–8)
PLATELET # BLD AUTO: 227 K/UL (ref 164–446)
PMV BLD AUTO: 9.7 FL (ref 9–12.9)
POTASSIUM SERPL-SCNC: 3.6 MMOL/L (ref 3.6–5.5)
PROT SERPL-MCNC: 7.1 G/DL (ref 6–8.2)
PROT UR QL STRIP: NEGATIVE MG/DL
RBC # BLD AUTO: 3.78 M/UL (ref 4.2–5.4)
RBC UR QL AUTO: NEGATIVE
SODIUM SERPL-SCNC: 135 MMOL/L (ref 135–145)
SP GR UR STRIP.AUTO: 1.01
THC UR QL SCN: NEGATIVE
TROPONIN T SERPL-MCNC: <6 NG/L (ref 6–19)
TSH SERPL DL<=0.005 MIU/L-ACNC: 1.12 UIU/ML (ref 0.38–5.33)
WBC # BLD AUTO: 8.3 K/UL (ref 4.8–10.8)

## 2019-08-12 PROCEDURE — 85379 FIBRIN DEGRADATION QUANT: CPT

## 2019-08-12 PROCEDURE — 700105 HCHG RX REV CODE 258: Performed by: EMERGENCY MEDICINE

## 2019-08-12 PROCEDURE — 700102 HCHG RX REV CODE 250 W/ 637 OVERRIDE(OP): Performed by: EMERGENCY MEDICINE

## 2019-08-12 PROCEDURE — G0378 HOSPITAL OBSERVATION PER HR: HCPCS

## 2019-08-12 PROCEDURE — 71045 X-RAY EXAM CHEST 1 VIEW: CPT

## 2019-08-12 PROCEDURE — 80305 DRUG TEST PRSMV DIR OPT OBS: CPT

## 2019-08-12 PROCEDURE — 93005 ELECTROCARDIOGRAM TRACING: CPT

## 2019-08-12 PROCEDURE — 80053 COMPREHEN METABOLIC PANEL: CPT

## 2019-08-12 PROCEDURE — 84484 ASSAY OF TROPONIN QUANT: CPT

## 2019-08-12 PROCEDURE — 93005 ELECTROCARDIOGRAM TRACING: CPT | Performed by: EMERGENCY MEDICINE

## 2019-08-12 PROCEDURE — 96374 THER/PROPH/DIAG INJ IV PUSH: CPT

## 2019-08-12 PROCEDURE — 84703 CHORIONIC GONADOTROPIN ASSAY: CPT

## 2019-08-12 PROCEDURE — 81003 URINALYSIS AUTO W/O SCOPE: CPT

## 2019-08-12 PROCEDURE — A9270 NON-COVERED ITEM OR SERVICE: HCPCS | Performed by: EMERGENCY MEDICINE

## 2019-08-12 PROCEDURE — 70450 CT HEAD/BRAIN W/O DYE: CPT

## 2019-08-12 PROCEDURE — 99285 EMERGENCY DEPT VISIT HI MDM: CPT

## 2019-08-12 PROCEDURE — 83690 ASSAY OF LIPASE: CPT

## 2019-08-12 PROCEDURE — 71275 CT ANGIOGRAPHY CHEST: CPT

## 2019-08-12 PROCEDURE — 84443 ASSAY THYROID STIM HORMONE: CPT

## 2019-08-12 PROCEDURE — 700117 HCHG RX CONTRAST REV CODE 255: Performed by: EMERGENCY MEDICINE

## 2019-08-12 PROCEDURE — 700105 HCHG RX REV CODE 258: Performed by: HOSPITALIST

## 2019-08-12 PROCEDURE — 700102 HCHG RX REV CODE 250 W/ 637 OVERRIDE(OP): Performed by: INTERNAL MEDICINE

## 2019-08-12 PROCEDURE — A9270 NON-COVERED ITEM OR SERVICE: HCPCS | Performed by: INTERNAL MEDICINE

## 2019-08-12 PROCEDURE — 85025 COMPLETE CBC W/AUTO DIFF WBC: CPT

## 2019-08-12 PROCEDURE — 99219 PR INITIAL OBSERVATION CARE,LEVL II: CPT | Performed by: HOSPITALIST

## 2019-08-12 PROCEDURE — 700111 HCHG RX REV CODE 636 W/ 250 OVERRIDE (IP): Performed by: EMERGENCY MEDICINE

## 2019-08-12 PROCEDURE — 94760 N-INVAS EAR/PLS OXIMETRY 1: CPT

## 2019-08-12 RX ORDER — LORAZEPAM 2 MG/ML
0.5 INJECTION INTRAMUSCULAR ONCE
Status: COMPLETED | OUTPATIENT
Start: 2019-08-12 | End: 2019-08-12

## 2019-08-12 RX ORDER — FLUOXETINE HYDROCHLORIDE 20 MG/1
40 CAPSULE ORAL DAILY
Status: DISCONTINUED | OUTPATIENT
Start: 2019-08-13 | End: 2019-08-13 | Stop reason: HOSPADM

## 2019-08-12 RX ORDER — SODIUM CHLORIDE 9 MG/ML
1000 INJECTION, SOLUTION INTRAVENOUS ONCE
Status: DISCONTINUED | OUTPATIENT
Start: 2019-08-12 | End: 2019-08-13 | Stop reason: HOSPADM

## 2019-08-12 RX ORDER — ACETAMINOPHEN 325 MG/1
650 TABLET ORAL EVERY 6 HOURS PRN
Status: DISCONTINUED | OUTPATIENT
Start: 2019-08-12 | End: 2019-08-13 | Stop reason: HOSPADM

## 2019-08-12 RX ORDER — ASPIRIN 325 MG
325 TABLET ORAL ONCE
Status: COMPLETED | OUTPATIENT
Start: 2019-08-12 | End: 2019-08-12

## 2019-08-12 RX ORDER — POLYETHYLENE GLYCOL 3350 17 G/17G
1 POWDER, FOR SOLUTION ORAL
Status: DISCONTINUED | OUTPATIENT
Start: 2019-08-12 | End: 2019-08-13 | Stop reason: HOSPADM

## 2019-08-12 RX ORDER — ONDANSETRON 4 MG/1
4 TABLET, ORALLY DISINTEGRATING ORAL EVERY 4 HOURS PRN
Status: DISCONTINUED | OUTPATIENT
Start: 2019-08-12 | End: 2019-08-13 | Stop reason: HOSPADM

## 2019-08-12 RX ORDER — LORAZEPAM 0.5 MG/1
0.5 TABLET ORAL EVERY 4 HOURS PRN
Status: DISCONTINUED | OUTPATIENT
Start: 2019-08-12 | End: 2019-08-13 | Stop reason: HOSPADM

## 2019-08-12 RX ORDER — ONDANSETRON 2 MG/ML
4 INJECTION INTRAMUSCULAR; INTRAVENOUS EVERY 4 HOURS PRN
Status: DISCONTINUED | OUTPATIENT
Start: 2019-08-12 | End: 2019-08-13 | Stop reason: HOSPADM

## 2019-08-12 RX ORDER — SODIUM CHLORIDE 9 MG/ML
INJECTION, SOLUTION INTRAVENOUS CONTINUOUS
Status: DISCONTINUED | OUTPATIENT
Start: 2019-08-12 | End: 2019-08-13 | Stop reason: HOSPADM

## 2019-08-12 RX ORDER — AMOXICILLIN 250 MG
2 CAPSULE ORAL 2 TIMES DAILY
Status: DISCONTINUED | OUTPATIENT
Start: 2019-08-12 | End: 2019-08-13 | Stop reason: HOSPADM

## 2019-08-12 RX ORDER — PROMETHAZINE HYDROCHLORIDE 25 MG/1
12.5-25 SUPPOSITORY RECTAL EVERY 4 HOURS PRN
Status: DISCONTINUED | OUTPATIENT
Start: 2019-08-12 | End: 2019-08-13 | Stop reason: HOSPADM

## 2019-08-12 RX ORDER — LORAZEPAM 1 MG/1
0.5 TABLET ORAL
COMMUNITY
End: 2019-09-26 | Stop reason: SDUPTHER

## 2019-08-12 RX ORDER — BISACODYL 10 MG
10 SUPPOSITORY, RECTAL RECTAL
Status: DISCONTINUED | OUTPATIENT
Start: 2019-08-12 | End: 2019-08-13 | Stop reason: HOSPADM

## 2019-08-12 RX ORDER — PROMETHAZINE HYDROCHLORIDE 25 MG/1
12.5-25 TABLET ORAL EVERY 4 HOURS PRN
Status: DISCONTINUED | OUTPATIENT
Start: 2019-08-12 | End: 2019-08-13 | Stop reason: HOSPADM

## 2019-08-12 RX ADMIN — SODIUM CHLORIDE: 9 INJECTION, SOLUTION INTRAVENOUS at 21:11

## 2019-08-12 RX ADMIN — LORAZEPAM 0.5 MG: 0.5 TABLET ORAL at 23:39

## 2019-08-12 RX ADMIN — ASPIRIN 325 MG ORAL TABLET 325 MG: 325 PILL ORAL at 19:05

## 2019-08-12 RX ADMIN — LORAZEPAM 0.5 MG: 2 INJECTION INTRAMUSCULAR; INTRAVENOUS at 15:10

## 2019-08-12 RX ADMIN — IOHEXOL 35 ML: 350 INJECTION, SOLUTION INTRAVENOUS at 15:46

## 2019-08-12 ASSESSMENT — PATIENT HEALTH QUESTIONNAIRE - PHQ9
5. POOR APPETITE OR OVEREATING: NOT AT ALL
7. TROUBLE CONCENTRATING ON THINGS, SUCH AS READING THE NEWSPAPER OR WATCHING TELEVISION: NEARLY EVERY DAY
6. FEELING BAD ABOUT YOURSELF - OR THAT YOU ARE A FAILURE OR HAVE LET YOURSELF OR YOUR FAMILY DOWN: NEARLY EVERY DAY
3. TROUBLE FALLING OR STAYING ASLEEP OR SLEEPING TOO MUCH: SEVERAL DAYS
1. LITTLE INTEREST OR PLEASURE IN DOING THINGS: NOT AT ALL
9. THOUGHTS THAT YOU WOULD BE BETTER OFF DEAD, OR OF HURTING YOURSELF: NOT AT ALL
8. MOVING OR SPEAKING SO SLOWLY THAT OTHER PEOPLE COULD HAVE NOTICED. OR THE OPPOSITE, BEING SO FIGETY OR RESTLESS THAT YOU HAVE BEEN MOVING AROUND A LOT MORE THAN USUAL: NEARLY EVERY DAY
4. FEELING TIRED OR HAVING LITTLE ENERGY: NEARLY EVERY DAY
2. FEELING DOWN, DEPRESSED, IRRITABLE, OR HOPELESS: SEVERAL DAYS
SUM OF ALL RESPONSES TO PHQ9 QUESTIONS 1 AND 2: 1
SUM OF ALL RESPONSES TO PHQ QUESTIONS 1-9: 14

## 2019-08-12 ASSESSMENT — ENCOUNTER SYMPTOMS
RESPIRATORY NEGATIVE: 1
FLANK PAIN: 0
CHILLS: 0
SPEECH CHANGE: 1
HEADACHES: 0
LOSS OF CONSCIOUSNESS: 0
COUGH: 0
NAUSEA: 0
BRUISES/BLEEDS EASILY: 0
TINGLING: 1
ABDOMINAL PAIN: 0
TREMORS: 0
HALLUCINATIONS: 0
BACK PAIN: 0
MYALGIAS: 0
SHORTNESS OF BREATH: 0
MEMORY LOSS: 0
CARDIOVASCULAR NEGATIVE: 1
WEIGHT LOSS: 0
SEIZURES: 0
VOMITING: 0
CONSTITUTIONAL NEGATIVE: 1
NERVOUS/ANXIOUS: 1
MUSCULOSKELETAL NEGATIVE: 1
FEVER: 0
DEPRESSION: 0
WEAKNESS: 0
DIZZINESS: 0
SENSORY CHANGE: 1
GASTROINTESTINAL NEGATIVE: 1
INSOMNIA: 0
FOCAL WEAKNESS: 0

## 2019-08-12 ASSESSMENT — LIFESTYLE VARIABLES
ON A TYPICAL DAY WHEN YOU DRINK ALCOHOL HOW MANY DRINKS DO YOU HAVE: 8
TOTAL SCORE: 4
CONSUMPTION TOTAL: POSITIVE
EVER FELT BAD OR GUILTY ABOUT YOUR DRINKING: YES
HAVE YOU EVER FELT YOU SHOULD CUT DOWN ON YOUR DRINKING: YES
TOTAL SCORE: 4
SUBSTANCE_ABUSE: 1
DOES PATIENT WANT TO STOP DRINKING: YES
DOES PATIENT WANT TO TALK TO SOMEONE ABOUT QUITTING: YES
ALCOHOL_USE: YES
HAVE PEOPLE ANNOYED YOU BY CRITICIZING YOUR DRINKING: YES
EVER_SMOKED: YES
EVER HAD A DRINK FIRST THING IN THE MORNING TO STEADY YOUR NERVES TO GET RID OF A HANGOVER: YES
AVERAGE NUMBER OF DAYS PER WEEK YOU HAVE A DRINK CONTAINING ALCOHOL: 2
HOW MANY TIMES IN THE PAST YEAR HAVE YOU HAD 5 OR MORE DRINKS IN A DAY: 144
TOTAL SCORE: 4

## 2019-08-12 ASSESSMENT — COGNITIVE AND FUNCTIONAL STATUS - GENERAL
SUGGESTED CMS G CODE MODIFIER MOBILITY: CH
SUGGESTED CMS G CODE MODIFIER DAILY ACTIVITY: CH
MOBILITY SCORE: 24
DAILY ACTIVITIY SCORE: 24

## 2019-08-12 NOTE — ED NOTES
ERP at bedside. Pt agrees with plan of care discussed by ERP. AIDET acknowledged with patient. Renan in low position, side rail up for pt safety. Call light within reach. Will continue to monitor.

## 2019-08-12 NOTE — ED PROVIDER NOTES
ED Provider Note  CHIEF COMPLAINT  Chief Complaint   Patient presents with   • Dizziness   • Nausea       HPI  Mattie Hicks is a 39 y.o. female who presents with a sudden onset of lightheadedness, near syncope, tingling to bilateral arms and legs, heaviness in bilateral arms and legs and a tingling to her entire face with visible twitching of both sides of her face.  Symptoms occurred about noon today she was getting ready to go to work.  She states she was just standing in front of the mirror when the symptoms occurred.  She is never had anything like this happen before.  She has history of anxiety and depression but states that she does not think she is more anxious or depressed than normal.  She is on Prozac for her depression.  She denies anything new or different going on in her life that might contribute to excess anxiety or depression.  No recent cough, cold or flulike symptoms other than a sore throat 2 weeks ago.  That is since resolved.  No vomiting or diarrhea.  No headache.  She denies chest pains.  Initially she told me she did not feel shortness of breath, but then she recanted and said she did feel a little bit short of breath.  She told the nursing staff in triage that she had some palpitations.  Here in the ER she says she still feels a little tingling in bilateral arms and feet.  The twitching in her face is since resolved.  No headache.  No visual changes.  She said that her hands and arms stiffened up on her.  As she was talking to me about the stiffening of her hands and arms, she was demonstrating what looks to be a carpal pedal spasm.    REVIEW OF SYSTEMS  See HPI for further details.  Positive for bilateral upper extremity and lower extremity tingling and heaviness, tingling to entire face, twitching of her face, exposing them up of the hands, lightheadedness, near syncope, nausea, palpitations, shortness of breath, anxiety and depression.  Negative for headache, chest pain, vertigo, visual  changes, vomiting, diarrhea, abdominal pain.  All other systems are negative.    PAST MEDICAL HISTORY  History reviewed. No pertinent past medical history.  Patient with history of anxiety and depression    FAMILY HISTORY  Family History   Problem Relation Age of Onset   • COPD Father    • Heart Failure Maternal Grandfather        SOCIAL HISTORY  Social History     Socioeconomic History   • Marital status:      Spouse name: Not on file   • Number of children: Not on file   • Years of education: Not on file   • Highest education level: Not on file   Occupational History   • Not on file   Social Needs   • Financial resource strain: Not on file   • Food insecurity:     Worry: Not on file     Inability: Not on file   • Transportation needs:     Medical: Not on file     Non-medical: Not on file   Tobacco Use   • Smoking status: Former Smoker     Types: Cigarettes   • Smokeless tobacco: Never Used   Substance and Sexual Activity   • Alcohol use: Yes     Alcohol/week: 2.4 oz     Types: 4 Cans of beer per week     Comment: week ends   • Drug use: No   • Sexual activity: Not on file   Lifestyle   • Physical activity:     Days per week: Not on file     Minutes per session: Not on file   • Stress: Not on file   Relationships   • Social connections:     Talks on phone: Not on file     Gets together: Not on file     Attends Spiritism service: Not on file     Active member of club or organization: Not on file     Attends meetings of clubs or organizations: Not on file     Relationship status: Not on file   • Intimate partner violence:     Fear of current or ex partner: Not on file     Emotionally abused: Not on file     Physically abused: Not on file     Forced sexual activity: Not on file   Other Topics Concern   • Not on file   Social History Narrative   • Not on file       SURGICAL HISTORY  Past Surgical History:   Procedure Laterality Date   • PRIMARY C SECTION         CURRENT MEDICATIONS  Home Medications    **Home  "medications have not yet been reviewed for this encounter**     Patient reports Prozac is her only medication    ALLERGIES  No Known Allergies    PHYSICAL EXAM  VITAL SIGNS: /83   Pulse 81   Temp 36.6 °C (97.8 °F) (Temporal)   Resp 16   Ht 1.753 m (5' 9\")   Wt 85 kg (187 lb 6.3 oz)   SpO2 96%   BMI 27.67 kg/m²    Constitutional: Well developed, well nourished; No acute distress; Non-toxic appearance.   HENT: Normocephalic, atraumatic; Bilateral external ears normal; Oropharynx with moist mucous membranes; No erythema or exudates in the posterior oropharynx.   Eyes: PERRL, EOMI, Conjunctiva normal. No discharge.   Neck:  Supple, nontender midline; No stridor; No nuchal rigidity.   Lymphatic: No cervical lymphadenopathy noted.   Cardiovascular: Regular rate and rhythm without murmurs, rubs, or gallop.   Thorax & Lungs: No respiratory distress, breath sounds clear to auscultation bilaterally without wheezing, rales or rhonchi. Nontender chest wall. No crepitus or subcutaneous air  Abdomen: Soft, nontender, bowel sounds normal. No obvious masses; No pulsatile masses; no rebound, guarding, or peritoneal signs.   Skin: Good color; warm and dry without rash or petechia.  Back: Nontender, No CVA tenderness.   Extremities: Distal radial, dorsalis pedis, posterior tibial pulses are equal bilaterally; No edema; Nontender calves or saphenous, No cyanosis, No clubbing.   Musculoskeletal: Good range of motion in all major joints. No tenderness to palpation or major deformities noted.   Neurologic: Alert & oriented x 4, clear speech, cranial nerves II through XII intact without facial asymmetry, strengths 5 out of 5 equal bilateral upper and lower extremities, sensory grossly intact, no drift, no ataxia with finger to nose or heel to shin testing.      EKG  EKG: Normal sinus rhythm.  Rate of 73.  Normal axis.  Normal intervals.  T wave inverted in V2.  No ST elevation or depression.    RADIOLOGY/PROCEDURES  CT-CTA " CHEST PULMONARY ARTERY W/ RECONS   Final Result      1.  There is no CT evidence of acute pulmonary embolism.   2.  Incidental 4 mm groundglass posterior right upper lobe lung nodule is probably postinflammatory in a patient of this age.         Ground Glass and Part Solid: No routine follow-up      Comments: In certain suspicious nodules less than 6 mm, consider follow-up at 2 and 4 years. If solid component(s) or growth develops, consider resection.      Note: These recommendations do not apply to lung cancer screening, patients with immunosuppression, or patients with known primary cancer.      Fleischner Society 2017 Guidelines for Management of Incidentally Detected Pulmonary Nodules in Adults      DX-CHEST-PORTABLE (1 VIEW)   Final Result      No evidence of acute cardiopulmonary process.      CT-HEAD W/O   Final Result      1.  Head CT without contrast within normal limits. No evidence of acute cerebral infarction, hemorrhage or mass lesion.      MR-BRAIN-W/O    (Results Pending)       COURSE & MEDICAL DECISION MAKING  Pertinent Labs & Imaging studies reviewed. (See chart for details)  Results for orders placed or performed during the hospital encounter of 08/12/19   CBC WITH DIFFERENTIAL   Result Value Ref Range    WBC 8.3 4.8 - 10.8 K/uL    RBC 3.78 (L) 4.20 - 5.40 M/uL    Hemoglobin 12.1 12.0 - 16.0 g/dL    Hematocrit 36.4 (L) 37.0 - 47.0 %    MCV 96.3 81.4 - 97.8 fL    MCH 32.0 27.0 - 33.0 pg    MCHC 33.2 (L) 33.6 - 35.0 g/dL    RDW 44.5 35.9 - 50.0 fL    Platelet Count 227 164 - 446 K/uL    MPV 9.7 9.0 - 12.9 fL    Neutrophils-Polys 57.90 44.00 - 72.00 %    Lymphocytes 34.60 22.00 - 41.00 %    Monocytes 5.90 0.00 - 13.40 %    Eosinophils 0.80 0.00 - 6.90 %    Basophils 0.60 0.00 - 1.80 %    Immature Granulocytes 0.20 0.00 - 0.90 %    Nucleated RBC 0.00 /100 WBC    Neutrophils (Absolute) 4.81 2.00 - 7.15 K/uL    Lymphs (Absolute) 2.88 1.00 - 4.80 K/uL    Monos (Absolute) 0.49 0.00 - 0.85 K/uL    Eos  (Absolute) 0.07 0.00 - 0.51 K/uL    Baso (Absolute) 0.05 0.00 - 0.12 K/uL    Immature Granulocytes (abs) 0.02 0.00 - 0.11 K/uL    NRBC (Absolute) 0.00 K/uL   COMP METABOLIC PANEL   Result Value Ref Range    Sodium 135 135 - 145 mmol/L    Potassium 3.6 3.6 - 5.5 mmol/L    Chloride 104 96 - 112 mmol/L    Co2 23 20 - 33 mmol/L    Anion Gap 8.0 0.0 - 11.9    Glucose 81 65 - 99 mg/dL    Bun 12 8 - 22 mg/dL    Creatinine 0.72 0.50 - 1.40 mg/dL    Calcium 9.1 8.4 - 10.2 mg/dL    AST(SGOT) 18 12 - 45 U/L    ALT(SGPT) 17 2 - 50 U/L    Alkaline Phosphatase 43 30 - 99 U/L    Total Bilirubin 1.5 0.1 - 1.5 mg/dL    Albumin 3.9 3.2 - 4.9 g/dL    Total Protein 7.1 6.0 - 8.2 g/dL    Globulin 3.2 1.9 - 3.5 g/dL    A-G Ratio 1.2 g/dL   LIPASE   Result Value Ref Range    Lipase 29 7 - 58 U/L   TROPONIN   Result Value Ref Range    Troponin T <6 6 - 19 ng/L   URINALYSIS CULTURE, IF INDICATED   Result Value Ref Range    Color Yellow     Character Clear     Specific Gravity 1.010 <1.035    Ph 5.5 5.0 - 8.0    Glucose Negative Negative mg/dL    Ketones 15 (A) Negative mg/dL    Protein Negative Negative mg/dL    Bilirubin Negative Negative    Nitrite Negative Negative    Leukocyte Esterase Negative Negative    Occult Blood Negative Negative    Micro Urine Req see below    D-DIMER   Result Value Ref Range    D-Dimer Screen 0.53 (H) 0.00 - 0.50 ug/mL (FEU)   TSH   Result Value Ref Range    TSH 1.120 0.380 - 5.330 uIU/mL   HCG QUAL SERUM   Result Value Ref Range    Beta-Hcg Qualitative Serum Negative Negative   UR DRUG SCREEN([facility] ONLY)   Result Value Ref Range    Phencyclidine -Pcp Negative Negative    Benzodiazepines Negative Negative    Cocaine Metabolite Negative Negative    Amphetamines By Triage Negative Negative    Urine THC Negative Negative    Codeine-Morphine Negative Negative    Barbiturates Negative Negative   ESTIMATED GFR   Result Value Ref Range    GFR If African American >60 >60 mL/min/1.73 m 2    GFR If Non African  American >60 >60 mL/min/1.73 m 2   EKG (NOW)   Result Value Ref Range    Report       [facility] Emergency Dept[facility]    Test Date:  2019  Pt Name:    LYNN MONZON               Department: [facility]  MRN:        [id number]                      Room:  Gender:     Female                       Technician: HRR  :        [id number]                   Requested By:ER TRIAGE PROTOCOL  Order #:    [id number]                    Reading MD:    Measurements  Intervals                                Axis  Rate:       73                           P:          59  MA:         115                          QRS:        66  QRSD:       94                           T:          48  QT:         429  QTc:        473    Interpretive Statements  Sinus rhythm  Borderline short MA interval  RSR' in V1 or V2, probably normal variant  Minimal ST depression, inferior leads  Baseline wander in lead(s) III,V4  No previous ECG available for comparison       Patient presents to the ER with a sudden onset of lightheadedness, near syncope, nausea, tingling to bilateral arms and legs and bilateral sides of her face.  She describes twitching to her face.  She describes a carpal pedal spasm of bilateral hands, left greater than right.  She also complains of some shortness of breath and palpitations during this time.  No chest pain.  Here in the ER she is feeling a bit better.  Given some Ativan all of her symptoms resolved.  However, she also complained of some confusion and difficulty with word finding.  Even though the Ativan took away all of her tingling sensations to her extremities and the heaviness sensation in her extremities, and she still described feeling confused and feeling as if she was having a hard time finding her words.  For this reason she will be admitted to the hospital for MRI scanning.  She was given an aspirin.  CT brain is negative for acute intracranial hemorrhage or mass..  CT chest is negative for  pulmonary embolism.  Labs are unremarkable.  EKG is nonacute.  Troponin is negative.  No evidence for STEMI or non-STEMI.  Patient's neurologic exam is a normal.  Her NIH scale 0.  Despite her claiming that she is confused and having some word finding issues, I really do not see any thing that would suggest acute stroke.  Her speech is clear and articulate and she seems to be oriented and responding appropriately to questions.  However, she is still relating these concerns and therefore will be admitted for MRI scanning and monitoring.  Patient is amenable to hospital admission.  I spoke with Dr. Mikayla Waite she will kindly admit the patient to her service.      Patient was advised that she has a small lung nodule that needs to be followed up.  Patient and significant other understand and agree to follow-up as instructed.    Patient states she is feeling much better after the Ativan.  All of the extremity tingling and extremity heaviness has resolved.  She states she still feels a bit confused though and still feels as though she is having difficulty finding her words.  She is agreeable with hospital admission.    FINAL IMPRESSION  .  1. Near syncope Acute    2. Lightheadedness Acute    3. Anxiety Acute    4. Word finding difficulty Acute         This dictation has been created using voice recognition software. The accuracy of the dictation is limited by the abilities of the software. I expect there may be some errors of grammar and possibly content. I made every attempt to manually correct the errors within my dictation. However, errors related to voice recognition software may still exist and should be interpreted within the appropriate context.    Electronically signed by: Ninoska Cates, 8/12/2019 2:29 PM

## 2019-08-13 ENCOUNTER — APPOINTMENT (OUTPATIENT)
Dept: RADIOLOGY | Facility: MEDICAL CENTER | Age: 39
End: 2019-08-13
Attending: HOSPITALIST
Payer: COMMERCIAL

## 2019-08-13 ENCOUNTER — PATIENT OUTREACH (OUTPATIENT)
Dept: HEALTH INFORMATION MANAGEMENT | Facility: OTHER | Age: 39
End: 2019-08-13

## 2019-08-13 VITALS
HEIGHT: 69 IN | HEART RATE: 80 BPM | BODY MASS INDEX: 27.76 KG/M2 | OXYGEN SATURATION: 94 % | WEIGHT: 187.39 LBS | SYSTOLIC BLOOD PRESSURE: 112 MMHG | RESPIRATION RATE: 18 BRPM | TEMPERATURE: 97.9 F | DIASTOLIC BLOOD PRESSURE: 67 MMHG

## 2019-08-13 PROBLEM — F10.10 ALCOHOL ABUSE: Status: RESOLVED | Noted: 2019-08-12 | Resolved: 2019-08-13

## 2019-08-13 PROBLEM — F41.0 PANIC ATTACK: Status: RESOLVED | Noted: 2019-08-12 | Resolved: 2019-08-13

## 2019-08-13 PROCEDURE — 700102 HCHG RX REV CODE 250 W/ 637 OVERRIDE(OP): Performed by: HOSPITALIST

## 2019-08-13 PROCEDURE — A9270 NON-COVERED ITEM OR SERVICE: HCPCS | Performed by: INTERNAL MEDICINE

## 2019-08-13 PROCEDURE — 700105 HCHG RX REV CODE 258: Performed by: HOSPITALIST

## 2019-08-13 PROCEDURE — 99217 PR OBSERVATION CARE DISCHARGE: CPT | Performed by: INTERNAL MEDICINE

## 2019-08-13 PROCEDURE — 70551 MRI BRAIN STEM W/O DYE: CPT

## 2019-08-13 PROCEDURE — 700102 HCHG RX REV CODE 250 W/ 637 OVERRIDE(OP): Performed by: INTERNAL MEDICINE

## 2019-08-13 PROCEDURE — A9270 NON-COVERED ITEM OR SERVICE: HCPCS | Performed by: HOSPITALIST

## 2019-08-13 PROCEDURE — G0378 HOSPITAL OBSERVATION PER HR: HCPCS

## 2019-08-13 RX ORDER — CHLORDIAZEPOXIDE HYDROCHLORIDE 10 MG/1
10 CAPSULE, GELATIN COATED ORAL 3 TIMES DAILY PRN
Qty: 6 CAP | Refills: 0 | Status: SHIPPED | OUTPATIENT
Start: 2019-08-13 | End: 2019-08-16

## 2019-08-13 RX ADMIN — LORAZEPAM 0.5 MG: 0.5 TABLET ORAL at 08:45

## 2019-08-13 RX ADMIN — SODIUM CHLORIDE: 9 INJECTION, SOLUTION INTRAVENOUS at 07:24

## 2019-08-13 RX ADMIN — FLUOXETINE 40 MG: 20 CAPSULE ORAL at 05:21

## 2019-08-13 NOTE — DISCHARGE SUMMARY
HOSPITAL MEDICINE DISCHARGE SUMMARY    DATE OF ADMISSION:  8/12/2019    DATE OF DISCHARGE:  8/13/2019    CHIEF COMPLAINT ON ADMISSION  Chief Complaint   Patient presents with   • Dizziness   • Nausea       CODE STATUS  Full Code    Allergies   Allergen Reactions   • Prednisone Anxiety       DISCHARGE PROBLEM LIST  Principal Problem (Resolved):    Panic attack POA: Yes  Active Problems:    Depression POA: Yes  Resolved Problems:    Alcohol abuse POA: Yes      MEDICATIONS ON DISCHARGE   Mattie Hicks   Home Medication Instructions ANA:[id number]    Printed on:08/13/19 1977   Medication Information                      chlordiazepoxide (LIBRIUM) 10 MG capsule  Take 1 Cap by mouth 3 times a day as needed (alcohol withdrawal symptoms) for up to 3 days.             FLUoxetine (PROZAC) 40 MG capsule  Take 1 Cap by mouth every day.             levonorgestrel (MIRENA, 52 MG,) 20 MCG/24HR IUD  1 Each by Intrauterine route Once.             LORazepam (ATIVAN) 1 MG Tab  Take 0.5 mg by mouth.             multivitamin (THERAGRAN) Tab  Take 1 Tab by mouth every day for 90 days.                 CONSULTATIONS  None    HPI & HOSPITAL COURSE  39 years old woman who has a history of alcohol dependence and abuse, presented to emergency department after having a panic attack at home.  The patient was admitted to the hospital for monitoring of possible alcohol withdrawal as well as additional supportive care.  After being in hospital for the approximately 24 hours, no further event was witnessed or observed.  She did not go into alcohol withdrawal.  Although, in discussing with her and the patient on the day of discharge, she told me that the last drink was approximately 3 days prior to presentation.  Given this information, the patient still has a potential to go into withdrawal in the next few days.  She is adamant that she will not consume alcohol and made a verbal contract with myself.  As a precaution, I prescribed for her few days  of Librium as needed and discussed with her and her  at length regarding the signs and symptoms of acute alcohol withdrawal.  In the event, should she experience the symptoms, she is to take the Librium as needed, and call 911 to return to the ER for additional supportive care and treatment of her acute alcohol withdrawal.  Patient was instructed not to mix Librium with alcohol or other benzodiazepine or narcotics.  I believe, given her condition, underlying situation, I think the benefit of adding Librium on an as-needed basis outweighs the risks.    Therefore, she is discharged in good and stable condition to home with close outpatient follow-up.    SPECIFIC OUTPATIENT FOLLOW-UP RECOMMENDATIONS  Alcohol abuse cessation program referral    FOLLOW UP  No future appointments.  Patient is to follow-up with her primary care physician in the next 2 to 4 weeks to discuss outpatient alcohol abuse cessation program referral.  She has mentioned that she plan to enroll in the [employer] alcohol cessation program because her  recently has successfully completed the programs.    DIET  Orders Placed This Encounter   Procedures   • Diet Order Regular     Standing Status:   Standing     Number of Occurrences:   1     Order Specific Question:   Diet:     Answer:   Regular [1]     Resume home diet previous to admission    ACTIVITY  Resume previous level of activities.    PROCEDURES  None    PERTINENT RESULTS  Recent Labs     08/12/19  1450   WBC 8.3   RBC 3.78*   HEMOGLOBIN 12.1   HEMATOCRIT 36.4*   MCV 96.3   MCH 32.0   MCHC 33.2*   RDW 44.5   PLATELETCT 227   MPV 9.7       Total time of the discharge process exceeds 36 minutes.      Dean Johnson M.D.

## 2019-08-13 NOTE — ED NOTES
"Pt educated on plan of care, ERP stating \"I need to talk to the hospitalist about her\" pt verbalized understanding, will cont. Monitoring.   "

## 2019-08-13 NOTE — H&P
Hospital Medicine History & Physical Note    Date of Service  8/12/2019    Primary Care Physician  Mahsa La M.D.    Consultants  None.    Code Status  Full code.    Chief Complaint  Hyperventilation, hand cramping, difficulty finding words, facial numbness.    History of Presenting Illness  39 y.o. female who presented 8/12/2019 with sudden onset of hyperventilation, palpitations, dizziness followed by cramping of her hands and numbness of the face with tingling around her mouth. She has never had this before. Patient admits to drinking heavily for the past 4 days while her sister has been in town. Since age 17 she binge drinks on the weekends usually to the point of blackout. Her  quit drinking about 6 months ago and she did stop for about 3 weeks but then went back to drinking. When pregnant she did not drink alcohol. Patient denies any weakness of the extremities, slurred speech or facial droop but does say that she was having a lot of trouble finding words. She is on fluoxetine for depression, denies suicidal thoughts or intents.  with three children and holds down a job.    Review of Systems  Review of Systems   Constitutional: Negative.  Negative for chills, fever, malaise/fatigue and weight loss.   HENT: Negative.    Respiratory: Negative.  Negative for cough and shortness of breath.    Cardiovascular: Negative.  Negative for chest pain and leg swelling.   Gastrointestinal: Negative.  Negative for abdominal pain, nausea and vomiting.   Genitourinary: Negative.  Negative for dysuria and flank pain.   Musculoskeletal: Negative.  Negative for back pain and myalgias.   Neurological: Positive for tingling, sensory change and speech change. Negative for dizziness, tremors, focal weakness, seizures, loss of consciousness, weakness and headaches.   Endo/Heme/Allergies: Negative.  Does not bruise/bleed easily.   Psychiatric/Behavioral: Positive for substance abuse. Negative for depression,  hallucinations, memory loss and suicidal ideas. The patient is nervous/anxious. The patient does not have insomnia.    All other systems reviewed and are negative.      Past Medical History   has no past medical history on file.    Surgical History   has a past surgical history that includes primary c section.     Family History  family history includes COPD in her father; Heart Failure in her maternal grandfather.     Social History   reports that she has quit smoking. Her smoking use included cigarettes. She has never used smokeless tobacco. She reports that she drinks about 2.4 oz of alcohol per week. She reports that she does not use drugs.   Admits to binge drinking to the point of blackout on the weekends since age 17.    Allergies  No Known Allergies    Medications  Prior to Admission Medications   Prescriptions Last Dose Informant Patient Reported? Taking?   FLUoxetine (PROZAC) 40 MG capsule 2019 at Unknown time  No No   Sig: Take 1 Cap by mouth every day.   levonorgestrel (MIRENA, 52 MG,) 20 MCG/24HR IUD unknown at Unknown time  Yes No   Si Each by Intrauterine route Once.      Facility-Administered Medications: None       Physical Exam  Temp:  [36.6 °C (97.8 °F)] 36.6 °C (97.8 °F)  Pulse:  [55-81] 59  Resp:  [15-22] 19  BP: ()/(66-85) 113/69  SpO2:  [93 %-100 %] 98 %    Physical Exam   Constitutional: She is oriented to person, place, and time. She appears well-developed and well-nourished. No distress.   Plan of care discussed with bedside RN.   HENT:   Nose: Nose normal.   Mouth/Throat: Oropharynx is clear and moist. No oropharyngeal exudate.   Eyes: Conjunctivae are normal. Right eye exhibits no discharge. Left eye exhibits no discharge. No scleral icterus.   Neck: No JVD present. No tracheal deviation present.   Cardiovascular: Normal rate, regular rhythm and normal heart sounds.   Pulmonary/Chest: Effort normal and breath sounds normal. No stridor. No respiratory distress. She has no  wheezes. She has no rales. She exhibits no tenderness.   Abdominal: Soft. Bowel sounds are normal. She exhibits no distension. There is no tenderness.   Musculoskeletal: She exhibits no edema or tenderness.   Neurological: She is alert and oriented to person, place, and time. She has normal reflexes. She displays normal reflexes. No cranial nerve deficit. She exhibits normal muscle tone. Coordination normal.   Skin: Skin is warm and dry. She is not diaphoretic. No pallor.   Psychiatric: She has a normal mood and affect. Her behavior is normal. Judgment and thought content normal.   Nursing note and vitals reviewed.      Laboratory:  Recent Labs     08/12/19  1450   WBC 8.3   RBC 3.78*   HEMOGLOBIN 12.1   HEMATOCRIT 36.4*   MCV 96.3   MCH 32.0   MCHC 33.2*   RDW 44.5   PLATELETCT 227   MPV 9.7     Recent Labs     08/12/19  1450   SODIUM 135   POTASSIUM 3.6   CHLORIDE 104   CO2 23   GLUCOSE 81   BUN 12   CREATININE 0.72   CALCIUM 9.1     Recent Labs     08/12/19  1450   ALTSGPT 17   ASTSGOT 18   ALKPHOSPHAT 43   TBILIRUBIN 1.5   LIPASE 29   GLUCOSE 81         No results for input(s): NTPROBNP in the last 72 hours.      Recent Labs     08/12/19  1450   TROPONINT <6       Urinalysis:    Recent Labs     08/12/19  1456   SPECGRAVITY 1.010   GLUCOSEUR Negative   KETONES 15*   NITRITE Negative   LEUKESTERAS Negative        Imaging:  CT-CTA CHEST PULMONARY ARTERY W/ RECONS   Final Result      1.  There is no CT evidence of acute pulmonary embolism.   2.  Incidental 4 mm groundglass posterior right upper lobe lung nodule is probably postinflammatory in a patient of this age.         Ground Glass and Part Solid: No routine follow-up      Comments: In certain suspicious nodules less than 6 mm, consider follow-up at 2 and 4 years. If solid component(s) or growth develops, consider resection.      Note: These recommendations do not apply to lung cancer screening, patients with immunosuppression, or patients with known primary  cancer.      Fleischner Society 2017 Guidelines for Management of Incidentally Detected Pulmonary Nodules in Adults      DX-CHEST-PORTABLE (1 VIEW)   Final Result      No evidence of acute cardiopulmonary process.      CT-HEAD W/O   Final Result      1.  Head CT without contrast within normal limits. No evidence of acute cerebral infarction, hemorrhage or mass lesion.      MR-BRAIN-W/O    (Results Pending)         Assessment/Plan:  I anticipate this patient is appropriate for observation status at this time.    * Panic attack- (present on admission)  Assessment & Plan  Description of symptoms as well as setting of depression and alcohol abuse leads me to believe this is a panic attack. Because of her word finding difficulties I will admit her for MRI and observation overnight. Nothing in her history make me think this is a stroke. Normal examination.    Depression- (present on admission)  Assessment & Plan  Not in remission. Continue fluoxetine. Patient is going to seek mental health services for her depression and drinking behavior [facility]. Denies suicidal ideation or plan.    Alcohol abuse- (present on admission)  Assessment & Plan  No history of or current withdrawal.  Mainly binge drinking.  Patient states she will get outpatient alcohol treatment through [facility]; her  just went through their program 6 months ago.        VTE prophylaxis: scd

## 2019-08-13 NOTE — ASSESSMENT & PLAN NOTE
Not in remission. Continue fluoxetine. Patient is going to seek mental health services for her depression and drinking behavior through [facility]. Denies suicidal ideation or plan.

## 2019-08-13 NOTE — PROGRESS NOTES
Pt back from MRI. Monitor put back on pt. Pt complained of anxiety from claustrophobia in MRI machine. Medicated as per MAR

## 2019-08-13 NOTE — CARE PLAN
Problem: Safety  Goal: Will remain free from injury  Outcome: PROGRESSING AS EXPECTED  Note:   Remind patient to use call light and provide assistance. Bed in low position, bed locked, and appropriate alarms set. Patient wearing non-slip socks. Call light and personal belongings are within reach.     Problem: Knowledge Deficit  Goal: Knowledge of disease process/condition, treatment plan, diagnostic tests, and medications will improve  Outcome: PROGRESSING AS EXPECTED  Note:   Encourage patient to ask questions and be involved in plan of care. Provide education on treatment plan, diagnostic testing, and medications; have patient verbalize understanding.

## 2019-08-13 NOTE — ASSESSMENT & PLAN NOTE
Description of symptoms as well as setting of depression and alcohol abuse leads me to believe this is a panic attack. Because of her word finding difficulties I will admit her for MRI and observation overnight. Nothing in her history make me think this is a stroke. Normal examination.

## 2019-08-13 NOTE — DISCHARGE INSTRUCTIONS
DIET: Resume home diet previous to admission    ACTIVITY: Resume previous level of activities.    DIAGNOSIS: anxiety    Return to ER if feeling severely anxious, agitated, tremulous, confusion, sweating uncontrollably, fever (signs of alcohol withdrawal).  If you need to take the Librium, then call 911 to be transported back to ER.  Don't mix alcohol with Librium and other narcotics or benzodiazepines.    Dean Johnson M.D.   Discharge Instructions    Discharged to home by car with relative. Discharged via walking, hospital escort: Refused.  Special equipment needed: Not Applicable    Be sure to schedule a follow-up appointment with your primary care doctor or any specialists as instructed.     Discharge Plan:   Diet Plan: Discussed  Activity Level: Discussed  Smoking Cessation Offered: Patient Refused  Confirmed Follow up Appointment: Appointment Scheduled  Confirmed Symptoms Management: Discussed  Medication Reconciliation Updated: Yes  Influenza Vaccine Indication: Not indicated: Previously immunized this influenza season and > 8 years of age    I understand that a diet low in cholesterol, fat, and sodium is recommended for good health. Unless I have been given specific instructions below for another diet, I accept this instruction as my diet prescription.   Other diet: Regular    Special Instructions: None    · Is patient discharged on Warfarin / Coumadin?   No   Panic Attacks and sever anxiety  Panic attacks are sudden, short-lived surges of severe anxiety, fear, or discomfort. They may occur for no reason when you are relaxed, when you are anxious, or when you are sleeping. Panic attacks may occur for a number of reasons:  · Healthy people occasionally have panic attacks in extreme, life-threatening situations, such as war or natural disasters. Normal anxiety is a protective mechanism of the body that helps us react to danger (fight or flight response).  · Panic attacks are often seen with anxiety disorders,  such as panic disorder, social anxiety disorder, generalized anxiety disorder, and phobias. Anxiety disorders cause excessive or uncontrollable anxiety. They may interfere with your relationships or other life activities.  · Panic attacks are sometimes seen with other mental illnesses, such as depression and posttraumatic stress disorder.  · Certain medical conditions, prescription medicines, and drugs of abuse can cause panic attacks.  What are the signs or symptoms?  Panic attacks start suddenly, peak within 20 minutes, and are accompanied by four or more of the following symptoms:  · Pounding heart or fast heart rate (palpitations).  · Sweating.  · Trembling or shaking.  · Shortness of breath or feeling smothered.  · Feeling choked.  · Chest pain or discomfort.  · Nausea or strange feeling in your stomach.  · Dizziness, light-headedness, or feeling like you will faint.  · Chills or hot flushes.  · Numbness or tingling in your lips or hands and feet.  · Feeling that things are not real or feeling that you are not yourself.  · Fear of losing control or going crazy.  · Fear of dying.  Some of these symptoms can mimic serious medical conditions. For example, you may think you are having a heart attack. Although panic attacks can be very scary, they are not life threatening.  How is this diagnosed?  Panic attacks are diagnosed through an assessment by your health care provider. Your health care provider will ask questions about your symptoms, such as where and when they occurred. Your health care provider will also ask about your medical history and use of alcohol and drugs, including prescription medicines. Your health care provider may order blood tests or other studies to rule out a serious medical condition. Your health care provider may refer you to a mental health professional for further evaluation.  How is this treated?  · Most healthy people who have one or two panic attacks in an extreme, life-threatening  situation will not require treatment.  · The treatment for panic attacks associated with anxiety disorders or other mental illness typically involves counseling with a mental health professional, medicine, or a combination of both. Your health care provider will help determine what treatment is best for you.  · Panic attacks due to physical illness usually go away with treatment of the illness. If prescription medicine is causing panic attacks, talk with your health care provider about stopping the medicine, decreasing the dose, or substituting another medicine.  · Panic attacks due to alcohol or drug abuse go away with abstinence. Some adults need professional help in order to stop drinking or using drugs.  Follow these instructions at home:  · Take all medicines as directed by your health care provider.  · Schedule and attend follow-up visits as directed by your health care provider. It is important to keep all your appointments.  Contact a health care provider if:  · You are not able to take your medicines as prescribed.  · Your symptoms do not improve or get worse.  Get help right away if:  · You experience panic attack symptoms that are different than your usual symptoms.  · You have serious thoughts about hurting yourself or others.  · You are taking medicine for panic attacks and have a serious side effect.  This information is not intended to replace advice given to you by your health care provider. Make sure you discuss any questions you have with your health care provider.  Document Released: 12/18/2006 Document Revised: 05/25/2017 Document Reviewed: 08/01/2014  Elsevier Interactive Patient Education © 2017 [employer]      Alcohol Withdrawal  Introduction  When a person who drinks a lot of alcohol stops drinking, he or she may go through alcohol withdrawal. Alcohol withdrawal causes problems. It can make you feel:  · Tired (fatigued).  · Sad (depressed).  · Fearful (anxious).  · Grouchy  (irritable).  · Not hungry.  · Sick to your stomach (nauseous).  · Shaky.  It can also make you have:  · Nightmares.  · Trouble sleeping.  · Trouble thinking clearly.  · Mood swings.  · Clammy skin.  · Very bad sweating.  · A very fast heartbeat.  · Shaking that you cannot control (tremor).  · Having a fever.  · A fit of movements that you cannot control (seizure).  · Confusion.  · Throwing up (vomiting).  · Feeling or seeing things that are not there (hallucinations).  Follow these instructions at home:  · Take medicines and vitamins only as told by your doctor.  · Do not drink alcohol.  · Have someone around in case you need help.  · Drink enough fluid to keep your pee (urine) clear or pale yellow.  · Think about joining a group to help you stop drinking.  Contact a doctor if:  · Your problems get worse.  · Your problems do not go away.  · You cannot eat or drink without throwing up.  · You are having a hard time not drinking alcohol.  · You cannot stop drinking alcohol.  Get help right away if:  · You feel your heart beating differently than usual.  · Your chest hurts.  · You have trouble breathing.  · You have very bad problems, like:  ¨ A fever.  ¨ A fit of movements that you cannot control.  ¨ Being very confused.  ¨ Feeling or seeing things that are not there.  This information is not intended to replace advice given to you by your health care provider. Make sure you discuss any questions you have with your health care provider.  Document Released: 06/05/2009 Document Revised: 05/25/2017 Document Reviewed: 10/06/2015  © 2017 Elsevier  Chlordiazepoxide capsules  What is this medicine?  CHLORDIAZEPOXIDE (klor dye az e POX blanche) is a benzodiazepine. It is used to treat anxiety and alcohol withdrawal.  This medicine may be used for other purposes; ask your health care provider or pharmacist if you have questions.  COMMON BRAND NAME(S): Librium  What should I tell my health care provider before I take this  medicine?  They need to know if you have any of these conditions:  -an alcohol or drug abuse problem  -kidney or liver disease  -suicidal thoughts  -an unusual or allergic reaction to chlordiazepoxide, other benzodiazepines, foods, dyes, or preservatives  -pregnant or trying to get pregnant  -breast-feeding  How should I use this medicine?  Take this medicine by mouth with a glass of water. Follow the directions on the prescription label. Take your medicine at regular intervals. Do not take it more often than directed. Do not stop taking except on your doctor's advice.  A special MedGuide will be given to you by the pharmacist with each prescription and refill. Be sure to read this information carefully each time.  Talk to your pediatrician regarding the use of this medicine in children. While this drug may be prescribed for children as young as 6 years for selected conditions, precautions do apply.  Overdosage: If you think you have taken too much of this medicine contact a poison control center or emergency room at once.  NOTE: This medicine is only for you. Do not share this medicine with others.  What if I miss a dose?  If you miss a dose, take it as soon as you can. If it is almost time for your next dose, take only that dose. Do not take double or extra doses.  What may interact with this medicine?  Do not take this medication with any of the following medicines:  -narcotic medicines for cough  -sodium oxybate  This medicine may also interact with the following medications:  -alcohol  -antihistamines for allergy, cough and cold  -antiviral medicines for HIV or AIDS  -certain medicines for anxiety or sleep  -certain medicines for depression, like amitriptyline, fluoxetine, sertraline  -certain medicines for seizures like carbamazepine, phenobarbital, phenytoin, primidone  -cimetidine  -general anesthetics like halothane, isoflurane, methoxyflurane, propofol  -local anesthetics like lidocaine, pramoxine,  tetracaine  -medicines that relax muscles for surgery  -narcotic medicines for pain  -phenothiazines like chlorpromazine, mesoridazine, prochlorperazine, thioridazine  -warfarin  This list may not describe all possible interactions. Give your health care provider a list of all the medicines, herbs, non-prescription drugs, or dietary supplements you use. Also tell them if you smoke, drink alcohol, or use illegal drugs. Some items may interact with your medicine.  What should I watch for while using this medicine?  Tell your doctor or health care professional if your symptoms do not start to get better or if they get worse.  Do not stop taking except on your doctor's advice. You may develop a severe reaction. Your doctor will tell you how much medicine to take.  You may get drowsy or dizzy. Do not drive, use machinery, or do anything that needs mental alertness until you know how this medicine affects you. To reduce the risk of dizzy and fainting spells, do not stand or sit up quickly, especially if you are an older patient. Alcohol may increase dizziness and drowsiness. Avoid alcoholic drinks.  If you are taking another medicine that also causes drowsiness, you may have more side effects. Give your health care provider a list of all medicines you use. Your doctor will tell you how much medicine to take. Do not take more medicine than directed. Call emergency for help if you have problems breathing or unusual sleepiness.  What side effects may I notice from receiving this medicine?  Side effects that you should report to your doctor or health care professional as soon as possible:  -allergic reactions like skin rash, itching or hives, swelling of the face, lips, or tongue  -breathing problems  -confusion  -loss of balance or coordination  -signs and symptoms of low blood pressure like dizziness; feeling faint or lightheaded, falls; unusually weak or tired  -suicidal thoughts or other mood changes  Side effects that  usually do not require medical attention (report to your doctor or health care professional if they continue or are bothersome):  -constipation  -irritable  -nausea  -tiredness  This list may not describe all possible side effects. Call your doctor for medical advice about side effects. You may report side effects to FDA at 5-302-FDA-3392.  Where should I keep my medicine?  Keep out of the reach of children. This medicine can be abused. Keep your medicine in a safe place to protect it from theft. Do not share this medicine with anyone. Selling or giving away this medicine is dangerous and against the law.  This medicine may cause accidental overdose and death if taken by other adults, children, or pets. Mix any unused medicine with a substance like cat litter or coffee grounds. Then throw the medicine away in a sealed container like a sealed bag or a coffee can with a lid. Do not use the medicine after the expiration date.  Store at room temperature between 15 and 30 degrees C (59 and 86 degrees F).  NOTE: This sheet is a summary. It may not cover all possible information. If you have questions about this medicine, talk to your doctor, pharmacist, or health care provider.  © 2018 Elsevier/Gold Standard (2016-09-15 15:33:22)      Depression / Suicide Risk    As you are discharged from this [facility] facility, it is important to learn how to keep safe from harming yourself.    Recognize the warning signs:  · Abrupt changes in personality, positive or negative- including increase in energy   · Giving away possessions  · Change in eating patterns- significant weight changes-  positive or negative  · Change in sleeping patterns- unable to sleep or sleeping all the time   · Unwillingness or inability to communicate  · Depression  · Unusual sadness, discouragement and loneliness  · Talk of wanting to die  · Neglect of personal appearance   · Rebelliousness- reckless behavior  · Withdrawal from people/activities they  love  · Confusion- inability to concentrate     If you or a loved one observes any of these behaviors or has concerns about self-harm, here's what you can do:  · Talk about it- your feelings and reasons for harming yourself  · Remove any means that you might use to hurt yourself (examples: pills, rope, extension cords, firearm)  · Get professional help from the community (Mental Health, Substance Abuse, psychological counseling)  · Do not be alone:Call your Safe Contact- someone whom you trust who will be there for you.  · Call your local CRISIS HOTLINE 685-0265 or 341-565-0795  · Call your local Children's Mobile Crisis Response Team [place] (711) 467-4777 or www.Vendormate  · Call the toll free National Suicide Prevention Hotlines   · National Suicide Prevention Lifeline 589-410-XGJI (9598)  · National Hope Line Network 800-SUICIDE (848-5719)

## 2019-08-13 NOTE — ASSESSMENT & PLAN NOTE
No history of or current withdrawal.  Mainly binge drinking.  Patient states she will get outpatient alcohol treatment through [facility]; her  just went through their program 6 months ago.

## 2019-08-13 NOTE — PROGRESS NOTES
Discharge order written. IV removed, patient tolerated well. Tele removed and returned to monitor tech. Belongings fathered. Pt stated that all personal belongings are in possession. AVS printed, reviewed and copy signed and placed on the chart. Patient has no further questions. Prescriptions sent to [facility]. Discharge in satisfactory condition home with . Pt off unit via walking, escorted by /declined staff escort.

## 2019-08-13 NOTE — PROGRESS NOTES
Report received from SUDHA Waller. Pt resting comfortably in bed. Declines any needs at this time. Call light within reach, bed low/locked, bed alarm on. Will continue to monitor.

## 2019-08-13 NOTE — CARE PLAN
Problem: Safety  Goal: Will remain free from injury  Outcome: PROGRESSING AS EXPECTED  Goal: Will remain free from falls  Outcome: PROGRESSING AS EXPECTED  Pt aware of POC. Pt understands the need for safety and effectively demonstrates by wearing treaded sock and using call light appropriately      Problem: Infection  Goal: Will remain free from infection  Outcome: PROGRESSING AS EXPECTED  Pt aware of POC. Pt understands infection prevention and effectively demonstrates by performing adequate hand hygiene

## 2019-08-13 NOTE — PROGRESS NOTES
Telemetry Shift Summary    Rhythm SR  HR Range 80-90  Ectopy none  Measurements 0.16/0.08/0.44        Normal Values  Rhythm SR  HR Range    Measurements 0.12-0.20 / 0.06-0.10  / 0.30-0.52

## 2019-08-13 NOTE — PROGRESS NOTES
Completed assessment. Bed in low position, locked, and appropriate alarms set. Personal belongings and call light are within reach. Patient has no additional needs at this time.

## 2019-08-13 NOTE — PROGRESS NOTES
2 RN skin check complete with SUDHA Hunt.   Devices in place cardiac monitor.  Skin assessed under devices cardiac monitor.  Confirmed pressure ulcers found on n/a.  New potential pressure ulcers noted on n/a.  The following interventions in place remind patient to make frequent positional changes.

## 2019-08-13 NOTE — PROGRESS NOTES
Telemetry Shift Summary    Rhythm SR/SB  HR Range 56-83  Ectopy -  Measurements 0.14/0.10/0.40        Normal Values  Rhythm SR  HR Range    Measurements 0.12-0.20 / 0.06-0.10  / 0.30-0.52

## 2019-08-13 NOTE — ED NOTES
Report from Mateo HALEY. Rounded on patient. Given another blanket. Call light in reach. All needs met at this time.

## 2019-08-13 NOTE — PROGRESS NOTES
Patient requested medication for sleep. Updated Dr. Louis that patient has 8 shots 2-3x per week. Per patient she takes Ativan 0.5 mg for anxiety at least 2-3x per month. Patient unable to recall frequency prescribed. At this time patient does not have signs or symptoms of ETOH withdrawal. Dr. Louis to add order for sleep.

## 2019-08-13 NOTE — PROGRESS NOTES
Gave bedside report to SUDHA Garibay and SUDHA Remy. Plan of care discussed. Safety precautions in place. Personal belongings and call light are with in reach. Patient has no additional needs at this time.

## 2019-08-21 ENCOUNTER — OFFICE VISIT (OUTPATIENT)
Dept: MEDICAL GROUP | Facility: PHYSICIAN GROUP | Age: 39
End: 2019-08-21
Payer: COMMERCIAL

## 2019-08-21 VITALS
DIASTOLIC BLOOD PRESSURE: 82 MMHG | HEIGHT: 68 IN | HEART RATE: 72 BPM | RESPIRATION RATE: 16 BRPM | SYSTOLIC BLOOD PRESSURE: 126 MMHG | OXYGEN SATURATION: 95 % | BODY MASS INDEX: 28.55 KG/M2 | TEMPERATURE: 99.2 F | WEIGHT: 188.4 LBS

## 2019-08-21 DIAGNOSIS — F41.9 ANXIETY: ICD-10-CM

## 2019-08-21 DIAGNOSIS — F33.1 MODERATE EPISODE OF RECURRENT MAJOR DEPRESSIVE DISORDER (HCC): ICD-10-CM

## 2019-08-21 DIAGNOSIS — Z12.39 BREAST CANCER SCREENING: ICD-10-CM

## 2019-08-21 DIAGNOSIS — R41.840 ATTENTION DEFICIT: ICD-10-CM

## 2019-08-21 DIAGNOSIS — F10.10 ALCOHOL ABUSE: ICD-10-CM

## 2019-08-21 PROCEDURE — 99214 OFFICE O/P EST MOD 30 MIN: CPT | Performed by: FAMILY MEDICINE

## 2019-08-21 RX ORDER — ATOMOXETINE 40 MG/1
40 CAPSULE ORAL DAILY
Qty: 30 CAP | Refills: 3 | Status: SHIPPED | OUTPATIENT
Start: 2019-08-21 | End: 2020-04-30

## 2019-08-21 NOTE — PROGRESS NOTES
Chief Complaint   Patient presents with   • Hospital Follow-up   • Follow-Up     labs       HISTORY OF PRESENT ILLNESS: Patient is a 39 y.o. female established patient here today for the following concerns:    Here for hospital follow up.  Was feeling altered and had SOB.  + d-dimer, negative work up otherwise.  Thought to have some panic, ? ETOH withdrawal.  Doing well now.  Remained abstinent from alcohol.  Finds that her over anxiety is still quite high despite treatment with the fluoxetine.  Reports that her attention to detail at work and home can cause a lot of stress and anxiety.  This has not been relieved with fluoxetine alone.  She reports sister with significant ADHD.        Past Medical, Social, and Family history reviewed and updated in EPIC    Allergies:Prednisone    Current Outpatient Medications   Medication Sig Dispense Refill   • atomoxetine (STRATTERA) 40 MG capsule Take 1 Cap by mouth every day. 30 Cap 3   • multivitamin (THERAGRAN) Tab Take 1 Tab by mouth every day for 90 days. 90 Tab 0   • LORazepam (ATIVAN) 1 MG Tab Take 0.5 mg by mouth.     • levonorgestrel (MIRENA, 52 MG,) 20 MCG/24HR IUD 1 Each by Intrauterine route Once.       No current facility-administered medications for this visit.          ROS:  Review of Systems   Constitutional: Negative for fever, chills, weight loss and malaise/fatigue.   HENT: Negative for ear pain, nosebleeds, congestion, sore throat and neck pain.    Eyes: Negative for blurred vision.   Respiratory: Negative for cough, sputum production, shortness of breath and wheezing.    Cardiovascular: Negative for chest pain, palpitations,  and leg swelling.   Gastrointestinal: Negative for heartburn, nausea, vomiting, diarrhea and abdominal pain.   Genitourinary: Negative for dysuria, urgency and frequency.   Musculoskeletal: Negative for myalgias, back pain and joint pain.   Skin: Negative for rash and itching.   Neurological: Negative for dizziness, tingling, tremors,  "sensory change, focal weakness and headaches.   Endo/Heme/Allergies: Does not bruise/bleed easily.   Psychiatric/Behavioral: Negative for depression, anxiety, suicidal ideas, insomnia and memory loss.      Exam:  /82 (BP Location: Right arm, Patient Position: Sitting, BP Cuff Size: Adult)   Pulse 72   Temp 37.3 °C (99.2 °F)   Resp 16   Ht 1.727 m (5' 8\")   Wt 85.5 kg (188 lb 6.4 oz)   SpO2 95%     General:  Well nourished, well developed in NAD  Head is grossly normal.  Neck: Supple without JVD   Pulmonary:  Normal effort.   Cardiovascular: Regular rate  Extremities: no clubbing, cyanosis, or edema.  Psych: affect appropriate      Please note that this dictation was created using voice recognition software. I have made every reasonable attempt to correct obvious errors, but I expect that there are errors of grammar and possibly content that I did not discover before finalizing the note.    Assessment/Plan:  1. Breast cancer screening  - MA-SCREENING MAMMO BILAT W/TOMOSYNTHESIS W/CAD; Future    2. Attention deficit  - atomoxetine (STRATTERA) 40 MG capsule; Take 1 Cap by mouth every day.  Dispense: 30 Cap; Refill: 3    3. Anxiety  Suspect 2/2 to underlying Attention deficit.     4. Alcohol abuse  Continue working towards abstinence    5. Moderate episode of recurrent major depressive disorder (HCC)  Suspect in part due to Attention deficit.  Will taper off the fluoxetine and start Strattera.  Recheck in 4-6 weeks.             "

## 2019-09-26 DIAGNOSIS — F41.9 ANXIETY: ICD-10-CM

## 2019-09-26 RX ORDER — LORAZEPAM 1 MG/1
1 TABLET ORAL EVERY 8 HOURS PRN
Qty: 30 TAB | Refills: 0 | Status: SHIPPED
Start: 2019-09-26 | End: 2020-01-06 | Stop reason: SDUPTHER

## 2019-09-27 NOTE — TELEPHONE ENCOUNTER
Phone Number Called: [phone] (home)       Call outcome: left message for patient to call back regarding message below

## 2019-12-22 ENCOUNTER — OFFICE VISIT (OUTPATIENT)
Dept: URGENT CARE | Facility: PHYSICIAN GROUP | Age: 39
End: 2019-12-22
Payer: COMMERCIAL

## 2019-12-22 ENCOUNTER — HOSPITAL ENCOUNTER (OUTPATIENT)
Dept: RADIOLOGY | Facility: MEDICAL CENTER | Age: 39
End: 2019-12-22
Attending: NURSE PRACTITIONER
Payer: COMMERCIAL

## 2019-12-22 VITALS
BODY MASS INDEX: 29.31 KG/M2 | OXYGEN SATURATION: 96 % | WEIGHT: 193.4 LBS | TEMPERATURE: 99.1 F | RESPIRATION RATE: 18 BRPM | HEIGHT: 68 IN | DIASTOLIC BLOOD PRESSURE: 74 MMHG | SYSTOLIC BLOOD PRESSURE: 115 MMHG | HEART RATE: 85 BPM

## 2019-12-22 DIAGNOSIS — R07.89 CHEST DISCOMFORT: ICD-10-CM

## 2019-12-22 DIAGNOSIS — R07.89 COSTOCHONDRAL PAIN: ICD-10-CM

## 2019-12-22 PROCEDURE — 93000 ELECTROCARDIOGRAM COMPLETE: CPT | Performed by: NURSE PRACTITIONER

## 2019-12-22 PROCEDURE — 99214 OFFICE O/P EST MOD 30 MIN: CPT | Performed by: NURSE PRACTITIONER

## 2019-12-22 PROCEDURE — 71046 X-RAY EXAM CHEST 2 VIEWS: CPT

## 2019-12-22 RX ORDER — FLUOXETINE HYDROCHLORIDE 40 MG/1
CAPSULE ORAL
COMMUNITY
Start: 2019-10-07 | End: 2020-04-08

## 2019-12-22 NOTE — PROGRESS NOTES
Subjective:      Mattie Hicks is a 39 y.o. female who presents with Cough (chest pain, pain under l breast)            Chest Pain    This is a new problem. Episode onset: pt reports new onset of left sided chest discomfort that started last night. She states the intense discomfort last for 5 minutes while at rest sitting in bed. Reports a mild discomfort has lingered through today. Denies any previous cardiac hx. The onset quality is sudden. The problem occurs intermittently. The problem has been gradually improving. The pain is present in the lateral region. The pain is mild. Quality: she reports it today as a dull ache. The pain radiates to the mid back. Associated symptoms comments: She reports that certain motions, applied pressure to chest, coughing or deep breathing make the discomfort worse. Does admit that raising her left arm improves the discomfort. She has tried rest for the symptoms. The treatment provided mild relief. Risk factors include stress (reports hx of anxiety).   Pertinent negatives for past medical history include no CAD, no MI and no PE.   Pertinent negatives for family medical history include: no early MI and no sudden death.       Review of Systems   Cardiovascular: Positive for chest pain.   All other systems reviewed and are negative.    Past Medical History:   Diagnosis Date   • Depression       Past Surgical History:   Procedure Laterality Date   • PRIMARY C SECTION        Social History     Socioeconomic History   • Marital status:      Spouse name: Not on file   • Number of children: Not on file   • Years of education: Not on file   • Highest education level: Not on file   Occupational History   • Not on file   Social Needs   • Financial resource strain: Not on file   • Food insecurity:     Worry: Not on file     Inability: Not on file   • Transportation needs:     Medical: Not on file     Non-medical: Not on file   Tobacco Use   • Smoking status: Former Smoker     Types:  "Cigarettes   • Smokeless tobacco: Never Used   Substance and Sexual Activity   • Alcohol use: Not Currently   • Drug use: No   • Sexual activity: Yes     Partners: Male   Lifestyle   • Physical activity:     Days per week: Not on file     Minutes per session: Not on file   • Stress: Not on file   Relationships   • Social connections:     Talks on phone: Not on file     Gets together: Not on file     Attends Baptism service: Not on file     Active member of club or organization: Not on file     Attends meetings of clubs or organizations: Not on file     Relationship status: Not on file   • Intimate partner violence:     Fear of current or ex partner: Not on file     Emotionally abused: Not on file     Physically abused: Not on file     Forced sexual activity: Not on file   Other Topics Concern   • Not on file   Social History Narrative   • Not on file          Objective:     /74 (BP Location: Left arm, Patient Position: Sitting, BP Cuff Size: Adult)   Pulse 85   Temp 37.3 °C (99.1 °F) (Temporal)   Resp 18   Ht 1.727 m (5' 8\")   Wt 87.7 kg (193 lb 6.4 oz)   SpO2 96%   BMI 29.41 kg/m²      Physical Exam  Vitals signs and nursing note reviewed.   Constitutional:       Appearance: Normal appearance. She is normal weight.   HENT:      Head: Normocephalic and atraumatic.      Nose: Nose normal.   Eyes:      Extraocular Movements: Extraocular movements intact.      Pupils: Pupils are equal, round, and reactive to light.   Neck:      Musculoskeletal: Normal range of motion and neck supple.   Cardiovascular:      Rate and Rhythm: Normal rate and regular rhythm.      Heart sounds: Normal heart sounds. No murmur. No friction rub.   Pulmonary:      Effort: Pulmonary effort is normal.      Breath sounds: Normal breath sounds.   Musculoskeletal: Normal range of motion.   Skin:     General: Skin is warm and dry.      Capillary Refill: Capillary refill takes less than 2 seconds.   Neurological:      General: No focal " deficit present.      Mental Status: She is alert and oriented to person, place, and time. Mental status is at baseline.   Psychiatric:         Mood and Affect: Mood normal.         Speech: Speech normal.         Thought Content: Thought content normal.         Judgment: Judgment normal.            EKG: NSR rate: 70, normal axis, normal intervals, no evidence of ischemia or hypertrophy.     12/22/2019 1:05 PM     HISTORY/REASON FOR EXAM:  Chest Pain        TECHNIQUE/EXAM DESCRIPTION AND NUMBER OF VIEWS:  Two views of the chest.     COMPARISON:  8/12/2019     FINDINGS:     The cardiac silhouette  and mediastinal contours are normal.     No discrete opacity, pleural fluid or pneumothorax.     Mild spinal curvature, convex right.           IMPRESSION:     No acute cardiopulmonary findings.  Assessment/Plan:       1. Chest discomfort  - EKG  - DX-CHEST-2 VIEWS; Future    2. Costochondral pain    Discussed with patient at this time there is low concern for any acute coronary issue or pulmonary etiology  Her exam is grossly benign and reassuring  Suspect anxiety/stress or MSK origin  She understands and agrees  Advised her to seek further care in the ER if she becomes increasingly more concerned regarding her symptoms or if they fail to improve  I would like her to try tylenol and and ibuprofen as needed for the discomfort  Ice or warm compresses to chest wall  She can also try some of her anxiety medication that she takes PRN and eval for improvement  DDX discussed with patient include but are not limited to MI, PE, pleurisy, costochondritis, GERD, stress  Supportive care, differential diagnoses, and indications for immediate follow-up discussed with patient.    Pathogenesis of diagnosis discussed including typical length and natural progression.      Instructed to return to  or nearest emergency department if symptoms fail to improve, for any change in condition, further concerns, or new concerning symptoms.  Patient  states understanding of the plan of care and discharge instructions.

## 2020-01-06 DIAGNOSIS — F41.9 ANXIETY: ICD-10-CM

## 2020-01-07 RX ORDER — LORAZEPAM 1 MG/1
1 TABLET ORAL EVERY 8 HOURS PRN
Qty: 30 TAB | Refills: 0 | Status: SHIPPED
Start: 2020-01-07 | End: 2020-02-06

## 2020-01-22 ENCOUNTER — OFFICE VISIT (OUTPATIENT)
Dept: URGENT CARE | Facility: PHYSICIAN GROUP | Age: 40
End: 2020-01-22
Payer: COMMERCIAL

## 2020-01-22 VITALS
DIASTOLIC BLOOD PRESSURE: 82 MMHG | WEIGHT: 195.6 LBS | HEART RATE: 79 BPM | HEIGHT: 68 IN | RESPIRATION RATE: 20 BRPM | BODY MASS INDEX: 29.64 KG/M2 | OXYGEN SATURATION: 97 % | SYSTOLIC BLOOD PRESSURE: 118 MMHG | TEMPERATURE: 98.6 F

## 2020-01-22 DIAGNOSIS — J01.00 ACUTE MAXILLARY SINUSITIS, RECURRENCE NOT SPECIFIED: ICD-10-CM

## 2020-01-22 PROCEDURE — 99214 OFFICE O/P EST MOD 30 MIN: CPT | Performed by: PHYSICIAN ASSISTANT

## 2020-01-22 RX ORDER — AMOXICILLIN AND CLAVULANATE POTASSIUM 875; 125 MG/1; MG/1
1 TABLET, FILM COATED ORAL 2 TIMES DAILY
Qty: 14 TAB | Refills: 0 | Status: SHIPPED | OUTPATIENT
Start: 2020-01-22 | End: 2020-04-30

## 2020-01-22 ASSESSMENT — ENCOUNTER SYMPTOMS
VOMITING: 0
DIAPHORESIS: 0
WHEEZING: 0
SENSORY CHANGE: 0
HEADACHES: 1
SHORTNESS OF BREATH: 0
DIARRHEA: 0
PALPITATIONS: 0
NAUSEA: 0
FEVER: 0
SORE THROAT: 0
TINGLING: 0
NECK PAIN: 0
SINUS PRESSURE: 1
SWOLLEN GLANDS: 0
SINUS PAIN: 1
SPUTUM PRODUCTION: 0
COUGH: 0
CHILLS: 0
MYALGIAS: 0
FOCAL WEAKNESS: 0
ABDOMINAL PAIN: 0

## 2020-01-23 NOTE — PROGRESS NOTES
"Subjective:      Mattie Hicks is a 39 y.o. female who presents with Sinusitis (x4 days ago)            Sinusitis   This is a new problem. The current episode started in the past 7 days. The problem has been rapidly worsening since onset. There has been no fever. The pain is moderate. Associated symptoms include congestion, ear pain, headaches and sinus pressure. Pertinent negatives include no chills, coughing, diaphoresis, neck pain, shortness of breath, sneezing, sore throat or swollen glands. Treatments tried: Elodia Selzer cold medication. The treatment provided mild relief.     Past Medical History:   Diagnosis Date   • Depression        Past Surgical History:   Procedure Laterality Date   • PRIMARY C SECTION         Family History   Problem Relation Age of Onset   • No Known Problems Mother    • COPD Father    • Heart Failure Maternal Grandfather        Allergies   Allergen Reactions   • Prednisone Anxiety       Medications, Allergies, and current problem list reviewed today in Epic    Review of Systems   Constitutional: Negative for chills, diaphoresis, fever and malaise/fatigue.   HENT: Positive for congestion, ear pain, sinus pressure and sinus pain. Negative for ear discharge, sneezing and sore throat.    Respiratory: Negative for cough, sputum production, shortness of breath and wheezing.    Cardiovascular: Negative for chest pain, palpitations and leg swelling.   Gastrointestinal: Negative for abdominal pain, diarrhea, nausea and vomiting.   Musculoskeletal: Negative for myalgias and neck pain.   Skin: Negative for rash.   Neurological: Positive for headaches. Negative for tingling, sensory change and focal weakness.     All other systems reviewed and are negative.        Objective:     /82 (BP Location: Left arm, Patient Position: Sitting, BP Cuff Size: Adult)   Pulse 79   Temp 37 °C (98.6 °F) (Temporal)   Resp 20   Ht 1.727 m (5' 8\")   Wt 88.7 kg (195 lb 9.6 oz)   SpO2 97%   BMI 29.74 kg/m² "      Physical Exam  Constitutional:       General: She is not in acute distress.  HENT:      Head: Normocephalic and atraumatic.      Right Ear: Tympanic membrane, ear canal and external ear normal.      Left Ear: Tympanic membrane, ear canal and external ear normal.      Nose: Congestion and rhinorrhea present.      Right Sinus: Maxillary sinus tenderness present.      Left Sinus: Maxillary sinus tenderness present.      Mouth/Throat:      Mouth: Mucous membranes are moist.      Pharynx: Oropharynx is clear. No posterior oropharyngeal erythema.   Eyes:      Conjunctiva/sclera: Conjunctivae normal.   Cardiovascular:      Rate and Rhythm: Normal rate and regular rhythm.      Heart sounds: Normal heart sounds. No murmur. No friction rub. No gallop.    Pulmonary:      Effort: Pulmonary effort is normal. No respiratory distress.      Breath sounds: Normal breath sounds. No wheezing, rhonchi or rales.   Musculoskeletal: Normal range of motion.   Skin:     General: Skin is warm and dry.      Findings: No rash.   Neurological:      General: No focal deficit present.      Mental Status: She is alert and oriented to person, place, and time.   Psychiatric:         Mood and Affect: Mood normal.         Behavior: Behavior normal.         Thought Content: Thought content normal.         Judgment: Judgment normal.                 Assessment/Plan:       1. Acute maxillary sinusitis, recurrence not specified    - amoxicillin-clavulanate (AUGMENTIN) 875-125 MG Tab; Take 1 Tab by mouth 2 times a day.  Dispense: 14 Tab; Refill: 0     Differential diagnoses, Supportive care, and indications for immediate follow-up discussed with patient.   Instructed to return to clinic or nearest emergency department for any change in condition, further concerns, or worsening of symptoms.    The patient demonstrated a good understanding and agreed with the treatment plan.    Jannet Schaefer P.A.-C.

## 2020-02-19 ENCOUNTER — OFFICE VISIT (OUTPATIENT)
Dept: URGENT CARE | Facility: PHYSICIAN GROUP | Age: 40
End: 2020-02-19
Payer: COMMERCIAL

## 2020-02-19 VITALS
TEMPERATURE: 98.8 F | HEART RATE: 82 BPM | HEIGHT: 68 IN | RESPIRATION RATE: 20 BRPM | OXYGEN SATURATION: 95 % | WEIGHT: 197 LBS | DIASTOLIC BLOOD PRESSURE: 80 MMHG | BODY MASS INDEX: 29.86 KG/M2 | SYSTOLIC BLOOD PRESSURE: 122 MMHG

## 2020-02-19 DIAGNOSIS — J02.0 STREP PHARYNGITIS: ICD-10-CM

## 2020-02-19 LAB
INT CON NEG: NEGATIVE
INT CON POS: POSITIVE
S PYO AG THROAT QL: POSITIVE

## 2020-02-19 PROCEDURE — 99214 OFFICE O/P EST MOD 30 MIN: CPT | Performed by: FAMILY MEDICINE

## 2020-02-19 PROCEDURE — 87880 STREP A ASSAY W/OPTIC: CPT | Performed by: FAMILY MEDICINE

## 2020-02-19 RX ORDER — LORAZEPAM 1 MG/1
1 TABLET ORAL
COMMUNITY
End: 2020-05-04 | Stop reason: SDUPTHER

## 2020-02-19 RX ORDER — DOXYCYCLINE HYCLATE 100 MG
100 TABLET ORAL 2 TIMES DAILY
Qty: 20 TAB | Refills: 0 | Status: SHIPPED | OUTPATIENT
Start: 2020-02-19 | End: 2020-02-29

## 2020-02-19 ASSESSMENT — ENCOUNTER SYMPTOMS
STRIDOR: 0
HOARSE VOICE: 0

## 2020-02-19 NOTE — PATIENT INSTRUCTIONS
Pharyngitis  Pharyngitis is redness, pain, and swelling (inflammation) of your pharynx.  What are the causes?  Pharyngitis is usually caused by infection. Most of the time, these infections are from viruses (viral) and are part of a cold. However, sometimes pharyngitis is caused by bacteria (bacterial). Pharyngitis can also be caused by allergies. Viral pharyngitis may be spread from person to person by coughing, sneezing, and personal items or utensils (cups, forks, spoons, toothbrushes). Bacterial pharyngitis may be spread from person to person by more intimate contact, such as kissing.  What are the signs or symptoms?  Symptoms of pharyngitis include:  · Sore throat.  · Tiredness (fatigue).  · Low-grade fever.  · Headache.  · Joint pain and muscle aches.  · Skin rashes.  · Swollen lymph nodes.  · Plaque-like film on throat or tonsils (often seen with bacterial pharyngitis).  How is this diagnosed?  Your health care provider will ask you questions about your illness and your symptoms. Your medical history, along with a physical exam, is often all that is needed to diagnose pharyngitis. Sometimes, a rapid strep test is done. Other lab tests may also be done, depending on the suspected cause.  How is this treated?  Viral pharyngitis will usually get better in 3-4 days without the use of medicine. Bacterial pharyngitis is treated with medicines that kill germs (antibiotics).  Follow these instructions at home:  · Drink enough water and fluids to keep your urine clear or pale yellow.  · Only take over-the-counter or prescription medicines as directed by your health care provider:  ¨ If you are prescribed antibiotics, make sure you finish them even if you start to feel better.  ¨ Do not take aspirin.  · Get lots of rest.  · Gargle with 8 oz of salt water (½ tsp of salt per 1 qt of water) as often as every 1-2 hours to soothe your throat.  · Throat lozenges (if you are not at risk for choking) or sprays may be used to  soothe your throat.  Contact a health care provider if:  · You have large, tender lumps in your neck.  · You have a rash.  · You cough up green, yellow-brown, or bloody spit.  Get help right away if:  · Your neck becomes stiff.  · You drool or are unable to swallow liquids.  · You vomit or are unable to keep medicines or liquids down.  · You have severe pain that does not go away with the use of recommended medicines.  · You have trouble breathing (not caused by a stuffy nose).  This information is not intended to replace advice given to you by your health care provider. Make sure you discuss any questions you have with your health care provider.  Document Released: 12/18/2006 Document Revised: 05/25/2017 Document Reviewed: 08/25/2014  [employer] Interactive Patient Education © 2017 [employer]     continue all meds as prescribed

## 2020-02-19 NOTE — PROGRESS NOTES
"Subjective:   Mattie Hicks  is a 39 y.o. female who presents for Sore Throat (L side, chills, bodyaches, headache, fatigue xyesterday)        Pharyngitis    This is a new problem. The current episode started yesterday. The problem has been rapidly worsening. The pain is worse on the left side. The pain is severe. Pertinent negatives include no congestion, hoarse voice or stridor.     Review of Systems   HENT: Negative for congestion and hoarse voice.    Respiratory: Negative for stridor.      Allergies   Allergen Reactions   • Prednisone Anxiety      Objective:   /80 (BP Location: Right arm, Patient Position: Sitting, BP Cuff Size: Adult)   Pulse 82   Temp 37.1 °C (98.8 °F) (Temporal)   Resp 20   Ht 1.727 m (5' 8\")   Wt 89.4 kg (197 lb)   SpO2 95%   BMI 29.95 kg/m²   Physical Exam  Constitutional:       General: She is not in acute distress.     Appearance: She is well-developed.   HENT:      Head: Normocephalic and atraumatic.      Mouth/Throat:      Pharynx: Uvula midline. Posterior oropharyngeal erythema present.      Tonsils: No tonsillar abscesses.   Eyes:      Conjunctiva/sclera: Conjunctivae normal.      Pupils: Pupils are equal, round, and reactive to light.   Cardiovascular:      Rate and Rhythm: Normal rate and regular rhythm.      Heart sounds: No murmur.   Pulmonary:      Effort: Pulmonary effort is normal. No respiratory distress.      Breath sounds: Normal breath sounds.   Abdominal:      General: There is no distension.      Palpations: Abdomen is soft.      Tenderness: There is no abdominal tenderness.   Skin:     General: Skin is warm and dry.   Neurological:      Mental Status: She is alert and oriented to person, place, and time.      Sensory: No sensory deficit.      Deep Tendon Reflexes: Reflexes are normal and symmetric.           Assessment/Plan:   1. Strep pharyngitis  - doxycycline (VIBRAMYCIN) 100 MG Tab; Take 1 Tab by mouth 2 times a day for 10 days.  Dispense: 20 Tab; " Refill: 0  - POCT Rapid Strep A    Other orders  - LORazepam (ATIVAN) 1 MG Tab; Take 1 mg by mouth every four hours as needed for Anxiety.    Differential diagnosis, natural history, supportive care, and indications for immediate follow-up discussed.

## 2020-03-29 ENCOUNTER — OFFICE VISIT (OUTPATIENT)
Dept: URGENT CARE | Facility: CLINIC | Age: 40
End: 2020-03-29
Payer: COMMERCIAL

## 2020-03-29 VITALS
HEIGHT: 69 IN | WEIGHT: 194 LBS | BODY MASS INDEX: 28.73 KG/M2 | HEART RATE: 74 BPM | DIASTOLIC BLOOD PRESSURE: 76 MMHG | RESPIRATION RATE: 16 BRPM | TEMPERATURE: 99.3 F | SYSTOLIC BLOOD PRESSURE: 118 MMHG | OXYGEN SATURATION: 96 %

## 2020-03-29 DIAGNOSIS — J02.9 SORE THROAT: ICD-10-CM

## 2020-03-29 LAB
INT CON NEG: NORMAL
INT CON POS: NORMAL
S PYO AG THROAT QL: NEGATIVE

## 2020-03-29 PROCEDURE — 87880 STREP A ASSAY W/OPTIC: CPT | Performed by: NURSE PRACTITIONER

## 2020-03-29 PROCEDURE — 99214 OFFICE O/P EST MOD 30 MIN: CPT | Performed by: NURSE PRACTITIONER

## 2020-03-29 SDOH — HEALTH STABILITY: MENTAL HEALTH: HOW OFTEN DO YOU HAVE A DRINK CONTAINING ALCOHOL?: 2-4 TIMES A MONTH

## 2020-03-29 ASSESSMENT — ENCOUNTER SYMPTOMS
CHILLS: 0
FEVER: 0
SORE THROAT: 1
COUGH: 1

## 2020-03-29 ASSESSMENT — FIBROSIS 4 INDEX: FIB4 SCORE: 0.77

## 2020-03-29 NOTE — PROGRESS NOTES
Subjective:      Mattie Hicks is a 39 y.o. female who presents with Cough (sore throat, just since this morning,)    Past Medical History:   Diagnosis Date   • Depression      Social History     Socioeconomic History   • Marital status:      Spouse name: Not on file   • Number of children: Not on file   • Years of education: Not on file   • Highest education level: Not on file   Occupational History   • Not on file   Social Needs   • Financial resource strain: Not on file   • Food insecurity     Worry: Not on file     Inability: Not on file   • Transportation needs     Medical: Not on file     Non-medical: Not on file   Tobacco Use   • Smoking status: Former Smoker     Packs/day: 0.00     Types: Cigarettes   • Smokeless tobacco: Never Used   Substance and Sexual Activity   • Alcohol use: Yes     Frequency: 2-4 times a month   • Drug use: No   • Sexual activity: Yes     Partners: Male   Lifestyle   • Physical activity     Days per week: Not on file     Minutes per session: Not on file   • Stress: Not on file   Relationships   • Social connections     Talks on phone: Not on file     Gets together: Not on file     Attends Faith service: Not on file     Active member of club or organization: Not on file     Attends meetings of clubs or organizations: Not on file     Relationship status: Not on file   • Intimate partner violence     Fear of current or ex partner: Not on file     Emotionally abused: Not on file     Physically abused: Not on file     Forced sexual activity: Not on file   Other Topics Concern   • Not on file   Social History Narrative   • Not on file     Family History   Problem Relation Age of Onset   • No Known Problems Mother    • COPD Father    • Heart Failure Maternal Grandfather        Allergies: Prednisone    Patient is a 39-year-old female who presents today with complaint of mild dry cough and mildly sore throat.  Onset this morning upon waking up.  No other fever, aches, or chills.   "No shortness of breath or difficulty breathing.          Cough   This is a new problem. The current episode started today. The problem has been unchanged. The cough is non-productive. Associated symptoms include a sore throat. Pertinent negatives include no chills or fever. Nothing aggravates the symptoms. She has tried nothing for the symptoms. The treatment provided no relief.       Review of Systems   Constitutional: Positive for malaise/fatigue. Negative for chills and fever.   HENT: Positive for congestion and sore throat.    Respiratory: Positive for cough.    Skin: Negative.    All other systems reviewed and are negative.         Objective:     /76   Pulse 74   Temp 37.4 °C (99.3 °F) (Temporal)   Resp 16   Ht 1.753 m (5' 9\")   Wt 88 kg (194 lb)   SpO2 96%   BMI 28.65 kg/m²      Physical Exam  Vitals signs reviewed.   Constitutional:       Appearance: Normal appearance.   HENT:      Head: Normocephalic and atraumatic.      Right Ear: Tympanic membrane and external ear normal.      Left Ear: Tympanic membrane, ear canal and external ear normal.      Nose: Congestion present.      Mouth/Throat:      Mouth: Mucous membranes are moist.   Eyes:      Extraocular Movements: Extraocular movements intact.      Conjunctiva/sclera: Conjunctivae normal.      Pupils: Pupils are equal, round, and reactive to light.   Neck:      Musculoskeletal: Normal range of motion and neck supple.   Cardiovascular:      Rate and Rhythm: Normal rate and regular rhythm.      Pulses: Normal pulses.      Heart sounds: Normal heart sounds.   Pulmonary:      Effort: Pulmonary effort is normal.      Breath sounds: Normal breath sounds.   Musculoskeletal: Normal range of motion.   Skin:     General: Skin is warm and dry.      Capillary Refill: Capillary refill takes less than 2 seconds.   Neurological:      General: No focal deficit present.      Mental Status: She is alert and oriented to person, place, and time.   Psychiatric:    "      Mood and Affect: Mood normal.         Behavior: Behavior normal.         Thought Content: Thought content normal.         Judgment: Judgment normal.          poct strep: negative        Assessment/Plan:   Viral URI  Cough    Tylenol as needed  Humidifier  Warm salt water gargles as needed  Over-the-counter cough syrup of choice  ER precautions for respiratory distress  Call or return to urgent care otherwise for any further questions or concerns    There are no diagnoses linked to this encounter.

## 2020-04-07 NOTE — TELEPHONE ENCOUNTER
MA's- Please call pt and clarify if she is still taking the Strattera. She was to taper off of fluoxetine and start Strattera. Does she want to restart on fluoxetine?

## 2020-04-08 RX ORDER — FLUOXETINE HYDROCHLORIDE 40 MG/1
CAPSULE ORAL
Qty: 90 CAP | Refills: 0 | Status: SHIPPED | OUTPATIENT
Start: 2020-04-08 | End: 2020-08-14 | Stop reason: SDUPTHER

## 2020-04-08 NOTE — TELEPHONE ENCOUNTER
Pt states she has not been taking Strattera for past several months.  She has been taking fluoxitine.

## 2020-04-08 NOTE — TELEPHONE ENCOUNTER
Dr La- Please see note below, pt stopped taking Strattera that you initiated 8/19 ans would like to take fluoxetine again. Please refill as you see fit.

## 2020-04-24 ENCOUNTER — TELEPHONE (OUTPATIENT)
Dept: MEDICAL GROUP | Facility: PHYSICIAN GROUP | Age: 40
End: 2020-04-24

## 2020-04-24 DIAGNOSIS — Z97.5 IUD CONTRACEPTION: ICD-10-CM

## 2020-04-24 NOTE — TELEPHONE ENCOUNTER
1. Caller Name: Mattie  Call Back Number: [phone] (home)         How would the patient prefer to be contacted with a response: Phone call do NOT leave a detailed message    2. SPECIFIC Action To Be Taken: Referral pending, please sign.    3. Diagnosis/Clinical Reason for Request: IUD OUT/IN    4. Specialty & Provider Name/Lab/Imaging Location: GYN    5. Is appointment scheduled for requested order/referral: no    Patient was informed they will receive a return phone call from the office ONLY if there are any questions before processing their request. Advised to call back if they haven't received a call from the referral department in 5 days.

## 2020-04-24 NOTE — TELEPHONE ENCOUNTER
Mattie left message on MA voicemail.    Mattie is requesting that IUD be taken out and a new one put in.    Referral Pended for GYN.

## 2020-04-30 ENCOUNTER — APPOINTMENT (OUTPATIENT)
Dept: RADIOLOGY | Facility: MEDICAL CENTER | Age: 40
End: 2020-04-30
Attending: EMERGENCY MEDICINE
Payer: COMMERCIAL

## 2020-04-30 ENCOUNTER — HOSPITAL ENCOUNTER (OUTPATIENT)
Facility: MEDICAL CENTER | Age: 40
End: 2020-05-01
Attending: EMERGENCY MEDICINE | Admitting: HOSPITALIST
Payer: COMMERCIAL

## 2020-04-30 DIAGNOSIS — R20.2 PARESTHESIAS: ICD-10-CM

## 2020-04-30 DIAGNOSIS — G45.9 TIA (TRANSIENT ISCHEMIC ATTACK): ICD-10-CM

## 2020-04-30 LAB
ALBUMIN SERPL BCP-MCNC: 4.3 G/DL (ref 3.2–4.9)
ALBUMIN/GLOB SERPL: 1.2 G/DL
ALP SERPL-CCNC: 54 U/L (ref 30–99)
ALT SERPL-CCNC: 19 U/L (ref 2–50)
ANION GAP SERPL CALC-SCNC: 13 MMOL/L (ref 7–16)
APPEARANCE UR: CLEAR
AST SERPL-CCNC: 22 U/L (ref 12–45)
BASOPHILS # BLD AUTO: 0.5 % (ref 0–1.8)
BASOPHILS # BLD: 0.04 K/UL (ref 0–0.12)
BILIRUB SERPL-MCNC: 0.6 MG/DL (ref 0.1–1.5)
BILIRUB UR QL STRIP.AUTO: NEGATIVE
BUN SERPL-MCNC: 13 MG/DL (ref 8–22)
CALCIUM SERPL-MCNC: 9.4 MG/DL (ref 8.5–10.5)
CHLORIDE SERPL-SCNC: 101 MMOL/L (ref 96–112)
CO2 SERPL-SCNC: 22 MMOL/L (ref 20–33)
COLOR UR: YELLOW
CREAT SERPL-MCNC: 0.6 MG/DL (ref 0.5–1.4)
EKG IMPRESSION: NORMAL
EOSINOPHIL # BLD AUTO: 0.07 K/UL (ref 0–0.51)
EOSINOPHIL NFR BLD: 0.9 % (ref 0–6.9)
ERYTHROCYTE [DISTWIDTH] IN BLOOD BY AUTOMATED COUNT: 46.2 FL (ref 35.9–50)
GLOBULIN SER CALC-MCNC: 3.5 G/DL (ref 1.9–3.5)
GLUCOSE SERPL-MCNC: 88 MG/DL (ref 65–99)
GLUCOSE UR STRIP.AUTO-MCNC: NEGATIVE MG/DL
HCG SERPL QL: NEGATIVE
HCT VFR BLD AUTO: 39.3 % (ref 37–47)
HGB BLD-MCNC: 12.7 G/DL (ref 12–16)
IMM GRANULOCYTES # BLD AUTO: 0.02 K/UL (ref 0–0.11)
IMM GRANULOCYTES NFR BLD AUTO: 0.3 % (ref 0–0.9)
KETONES UR STRIP.AUTO-MCNC: 15 MG/DL
LEUKOCYTE ESTERASE UR QL STRIP.AUTO: NEGATIVE
LYMPHOCYTES # BLD AUTO: 2.17 K/UL (ref 1–4.8)
LYMPHOCYTES NFR BLD: 29.1 % (ref 22–41)
MAGNESIUM SERPL-MCNC: 2.1 MG/DL (ref 1.5–2.5)
MCH RBC QN AUTO: 32.4 PG (ref 27–33)
MCHC RBC AUTO-ENTMCNC: 32.3 G/DL (ref 33.6–35)
MCV RBC AUTO: 100.3 FL (ref 81.4–97.8)
MICRO URNS: ABNORMAL
MONOCYTES # BLD AUTO: 0.54 K/UL (ref 0–0.85)
MONOCYTES NFR BLD AUTO: 7.2 % (ref 0–13.4)
NEUTROPHILS # BLD AUTO: 4.62 K/UL (ref 2–7.15)
NEUTROPHILS NFR BLD: 62 % (ref 44–72)
NITRITE UR QL STRIP.AUTO: NEGATIVE
NRBC # BLD AUTO: 0 K/UL
NRBC BLD-RTO: 0 /100 WBC
PH UR STRIP.AUTO: 7 [PH] (ref 5–8)
PHOSPHATE SERPL-MCNC: 2.5 MG/DL (ref 2.5–4.5)
PLATELET # BLD AUTO: 241 K/UL (ref 164–446)
PMV BLD AUTO: 9.8 FL (ref 9–12.9)
POTASSIUM SERPL-SCNC: 3.9 MMOL/L (ref 3.6–5.5)
PROT SERPL-MCNC: 7.8 G/DL (ref 6–8.2)
PROT UR QL STRIP: NEGATIVE MG/DL
RBC # BLD AUTO: 3.92 M/UL (ref 4.2–5.4)
RBC UR QL AUTO: NEGATIVE
SODIUM SERPL-SCNC: 136 MMOL/L (ref 135–145)
SP GR UR STRIP.AUTO: 1.01
TROPONIN T SERPL-MCNC: <6 NG/L (ref 6–19)
UROBILINOGEN UR STRIP.AUTO-MCNC: 1 MG/DL
WBC # BLD AUTO: 7.5 K/UL (ref 4.8–10.8)

## 2020-04-30 PROCEDURE — 85025 COMPLETE CBC W/AUTO DIFF WBC: CPT

## 2020-04-30 PROCEDURE — 80053 COMPREHEN METABOLIC PANEL: CPT

## 2020-04-30 PROCEDURE — 83735 ASSAY OF MAGNESIUM: CPT

## 2020-04-30 PROCEDURE — 81003 URINALYSIS AUTO W/O SCOPE: CPT

## 2020-04-30 PROCEDURE — G0378 HOSPITAL OBSERVATION PER HR: HCPCS

## 2020-04-30 PROCEDURE — 99220 PR INITIAL OBSERVATION CARE,LEVL III: CPT | Performed by: HOSPITALIST

## 2020-04-30 PROCEDURE — 84703 CHORIONIC GONADOTROPIN ASSAY: CPT

## 2020-04-30 PROCEDURE — 93005 ELECTROCARDIOGRAM TRACING: CPT | Performed by: EMERGENCY MEDICINE

## 2020-04-30 PROCEDURE — 99285 EMERGENCY DEPT VISIT HI MDM: CPT

## 2020-04-30 PROCEDURE — 70450 CT HEAD/BRAIN W/O DYE: CPT

## 2020-04-30 PROCEDURE — 84100 ASSAY OF PHOSPHORUS: CPT

## 2020-04-30 PROCEDURE — 700102 HCHG RX REV CODE 250 W/ 637 OVERRIDE(OP): Performed by: HOSPITALIST

## 2020-04-30 PROCEDURE — A9270 NON-COVERED ITEM OR SERVICE: HCPCS | Performed by: HOSPITALIST

## 2020-04-30 PROCEDURE — 84484 ASSAY OF TROPONIN QUANT: CPT

## 2020-04-30 RX ORDER — POLYETHYLENE GLYCOL 3350 17 G/17G
1 POWDER, FOR SOLUTION ORAL
Status: DISCONTINUED | OUTPATIENT
Start: 2020-04-30 | End: 2020-05-01 | Stop reason: HOSPADM

## 2020-04-30 RX ORDER — ONDANSETRON 4 MG/1
4 TABLET, ORALLY DISINTEGRATING ORAL EVERY 4 HOURS PRN
Status: DISCONTINUED | OUTPATIENT
Start: 2020-04-30 | End: 2020-05-01 | Stop reason: HOSPADM

## 2020-04-30 RX ORDER — LORAZEPAM 2 MG/ML
1 INJECTION INTRAMUSCULAR ONCE
Status: DISCONTINUED | OUTPATIENT
Start: 2020-04-30 | End: 2020-05-01

## 2020-04-30 RX ORDER — LORAZEPAM 1 MG/1
1 TABLET ORAL
Status: DISCONTINUED | OUTPATIENT
Start: 2020-04-30 | End: 2020-05-01 | Stop reason: HOSPADM

## 2020-04-30 RX ORDER — FLUOXETINE HYDROCHLORIDE 20 MG/1
40 CAPSULE ORAL
Status: DISCONTINUED | OUTPATIENT
Start: 2020-04-30 | End: 2020-05-01 | Stop reason: HOSPADM

## 2020-04-30 RX ORDER — ONDANSETRON 2 MG/ML
4 INJECTION INTRAMUSCULAR; INTRAVENOUS EVERY 4 HOURS PRN
Status: DISCONTINUED | OUTPATIENT
Start: 2020-04-30 | End: 2020-05-01 | Stop reason: HOSPADM

## 2020-04-30 RX ORDER — PROCHLORPERAZINE EDISYLATE 5 MG/ML
5-10 INJECTION INTRAMUSCULAR; INTRAVENOUS EVERY 4 HOURS PRN
Status: DISCONTINUED | OUTPATIENT
Start: 2020-04-30 | End: 2020-05-01 | Stop reason: HOSPADM

## 2020-04-30 RX ORDER — AMOXICILLIN 250 MG
2 CAPSULE ORAL 2 TIMES DAILY
Status: DISCONTINUED | OUTPATIENT
Start: 2020-04-30 | End: 2020-05-01 | Stop reason: HOSPADM

## 2020-04-30 RX ORDER — BISACODYL 10 MG
10 SUPPOSITORY, RECTAL RECTAL
Status: DISCONTINUED | OUTPATIENT
Start: 2020-04-30 | End: 2020-05-01 | Stop reason: HOSPADM

## 2020-04-30 RX ORDER — ACETAMINOPHEN 325 MG/1
650 TABLET ORAL EVERY 6 HOURS PRN
Status: DISCONTINUED | OUTPATIENT
Start: 2020-04-30 | End: 2020-05-01 | Stop reason: HOSPADM

## 2020-04-30 RX ORDER — IBUPROFEN 200 MG
400 TABLET ORAL EVERY 6 HOURS PRN
COMMUNITY
End: 2020-05-14

## 2020-04-30 RX ORDER — PROMETHAZINE HYDROCHLORIDE 25 MG/1
12.5-25 SUPPOSITORY RECTAL EVERY 4 HOURS PRN
Status: DISCONTINUED | OUTPATIENT
Start: 2020-04-30 | End: 2020-05-01 | Stop reason: HOSPADM

## 2020-04-30 RX ORDER — PROMETHAZINE HYDROCHLORIDE 25 MG/1
12.5-25 TABLET ORAL EVERY 4 HOURS PRN
Status: DISCONTINUED | OUTPATIENT
Start: 2020-04-30 | End: 2020-05-01 | Stop reason: HOSPADM

## 2020-04-30 RX ADMIN — FLUOXETINE HYDROCHLORIDE 40 MG: 20 CAPSULE ORAL at 21:40

## 2020-04-30 SDOH — HEALTH STABILITY: MENTAL HEALTH: HOW OFTEN DO YOU HAVE 6 OR MORE DRINKS ON ONE OCCASION?: NEVER

## 2020-04-30 SDOH — HEALTH STABILITY: MENTAL HEALTH: HOW MANY STANDARD DRINKS CONTAINING ALCOHOL DO YOU HAVE ON A TYPICAL DAY?: 3 OR 4

## 2020-04-30 ASSESSMENT — ENCOUNTER SYMPTOMS
FALLS: 0
SHORTNESS OF BREATH: 0
VOMITING: 0
BLURRED VISION: 0
DOUBLE VISION: 0
DIZZINESS: 0
TREMORS: 0
CHILLS: 0
COUGH: 0
FLANK PAIN: 0
SENSORY CHANGE: 1
HEARTBURN: 0
STRIDOR: 0
WEAKNESS: 0
HEMOPTYSIS: 0
BLOOD IN STOOL: 0
PHOTOPHOBIA: 0
CONSTIPATION: 0
MYALGIAS: 0
ORTHOPNEA: 0
CLAUDICATION: 0
NERVOUS/ANXIOUS: 1
BACK PAIN: 0
SPUTUM PRODUCTION: 0
HEADACHES: 0
SINUS PAIN: 0
POLYDIPSIA: 0
EYE PAIN: 0
DEPRESSION: 1
FEVER: 0
PND: 0
NAUSEA: 0
ABDOMINAL PAIN: 0
HALLUCINATIONS: 0
DIARRHEA: 0
NECK PAIN: 0
TINGLING: 0
DIAPHORESIS: 0
PALPITATIONS: 0
BRUISES/BLEEDS EASILY: 0
SORE THROAT: 0
WHEEZING: 0

## 2020-04-30 ASSESSMENT — COGNITIVE AND FUNCTIONAL STATUS - GENERAL
DAILY ACTIVITIY SCORE: 24
SUGGESTED CMS G CODE MODIFIER MOBILITY: CH
MOBILITY SCORE: 24
SUGGESTED CMS G CODE MODIFIER DAILY ACTIVITY: CH

## 2020-04-30 ASSESSMENT — LIFESTYLE VARIABLES
DO YOU DRINK ALCOHOL: NO
TOTAL SCORE: 0
SUBSTANCE_ABUSE: 0
TOTAL SCORE: 0
DOES PATIENT WANT TO STOP DRINKING: NO
HAVE PEOPLE ANNOYED YOU BY CRITICIZING YOUR DRINKING: NO
EVER_SMOKED: YES
HAVE PEOPLE ANNOYED YOU BY CRITICIZING YOUR DRINKING: NO
EVER HAD A DRINK FIRST THING IN THE MORNING TO STEADY YOUR NERVES TO GET RID OF A HANGOVER: NO
TOTAL SCORE: 0
EVER FELT BAD OR GUILTY ABOUT YOUR DRINKING: NO
HOW MANY TIMES IN THE PAST YEAR HAVE YOU HAD 5 OR MORE DRINKS IN A DAY: 0
ON A TYPICAL DAY WHEN YOU DRINK ALCOHOL HOW MANY DRINKS DO YOU HAVE: 4
AVERAGE NUMBER OF DAYS PER WEEK YOU HAVE A DRINK CONTAINING ALCOHOL: 1
CONSUMPTION TOTAL: NEGATIVE
TOTAL SCORE: 0
DOES PATIENT WANT TO STOP DRINKING: NO
EVER FELT BAD OR GUILTY ABOUT YOUR DRINKING: NO
HAVE YOU EVER FELT YOU SHOULD CUT DOWN ON YOUR DRINKING: NO
HAVE YOU EVER FELT YOU SHOULD CUT DOWN ON YOUR DRINKING: NO
ALCOHOL_USE: YES
CONSUMPTION TOTAL: INCOMPLETE
TOTAL SCORE: 0
TOTAL SCORE: 0
ALCOHOL_USE: YES
DOES PATIENT WANT TO STOP DRINKING: NO
EVER HAD A DRINK FIRST THING IN THE MORNING TO STEADY YOUR NERVES TO GET RID OF A HANGOVER: NO

## 2020-04-30 ASSESSMENT — FIBROSIS 4 INDEX
FIB4 SCORE: 0.84
FIB4 SCORE: 0.77
FIB4 SCORE: 0.84

## 2020-04-30 NOTE — ED TRIAGE NOTES
Patient noted two days ago right arm numbness that she states is now spreading to her face, no focal weakness, no facial droop noted, she is alert and oriented, history of back problems for the last year but no evaluation on this.  Able to ambulate, no problems with speech.

## 2020-04-30 NOTE — ED PROVIDER NOTES
ED Provider Note    CHIEF COMPLAINT  Chief Complaint   Patient presents with   • Numbness     right arm        HPI  Mattie Hicks is a 39 y.o. female here for evaluation of right arm and right face paresthesia.  Patient states that she has had these paresthesias over the last couple of weeks intermittently.  Worse over the last 2 days.  She denies any fall, fever or chills.  She states that at the time of talking to her, they are mostly resolved however she states that she worries they will come back.  She denies any anticoagulants, chest pain, shortness breath, or back pain.  Patient has no ill contacts.  Nothing seems to alleviate exacerbate her symptoms.  She has no pain with these.      ROS  See HPI for further details, o/w negative.     PAST MEDICAL HISTORY   has a past medical history of Depression.    SOCIAL HISTORY  Social History     Tobacco Use   • Smoking status: Former Smoker     Packs/day: 0.00     Types: Cigarettes   • Smokeless tobacco: Never Used   Substance and Sexual Activity   • Alcohol use: Yes     Frequency: 2-4 times a month   • Drug use: No   • Sexual activity: Yes     Partners: Male       Family History  No bleeding disorders    SURGICAL HISTORY   has a past surgical history that includes primary c section.    CURRENT MEDICATIONS  Home Medications     Reviewed by Felicity Galeano (Pharmacy Tech) on 04/30/20 at 1541  Med List Status: Complete   Medication Last Dose Status   fluoxetine (PROZAC) 40 MG capsule 4/29/2020 Active   ibuprofen (MOTRIN) 200 MG Tab PRN Active   levonorgestrel (MIRENA, 52 MG,) 20 MCG/24HR IUD ABOUT 5 YEARS AGO Active   LORazepam (ATIVAN) 1 MG Tab 4/30/2020 Active                ALLERGIES  Allergies   Allergen Reactions   • Prednisone Anxiety       REVIEW OF SYSTEMS  See HPI for further details. Review of systems as above, otherwise all other systems are negative.     PHYSICAL EXAM  Constitutional: Well developed, well nourished. No acute distress.  HEENT:  Normocephalic, atraumatic. Posterior pharynx clear and moist.  Eyes:  EOMI. Normal sclera.  Neck: Supple, Full range of motion, nontender.  Chest/Pulmonary: clear to ausculation. Symmetrical expansion.   Cardio: Regular rate and rhythm with no murmur.   Abdomen: Soft, nontender. No peritoneal signs. No guarding. No palpable masses.  Back: No CVA tenderness, nontender midline, no step offs.  Musculoskeletal: No deformity, no edema, neurovascular intact.   Neuro: Clear speech, appropriate, cooperative, cranial nerves II-XII grossly intact.  Equal , good finger-nose bilaterally, dorsi plantar flexion extension 4-5 bilaterally.  Psych: Normal mood and affect      Results for orders placed or performed during the hospital encounter of 04/30/20   CBC WITH DIFFERENTIAL   Result Value Ref Range    WBC 7.5 4.8 - 10.8 K/uL    RBC 3.92 (L) 4.20 - 5.40 M/uL    Hemoglobin 12.7 12.0 - 16.0 g/dL    Hematocrit 39.3 37.0 - 47.0 %    .3 (H) 81.4 - 97.8 fL    MCH 32.4 27.0 - 33.0 pg    MCHC 32.3 (L) 33.6 - 35.0 g/dL    RDW 46.2 35.9 - 50.0 fL    Platelet Count 241 164 - 446 K/uL    MPV 9.8 9.0 - 12.9 fL    Neutrophils-Polys 62.00 44.00 - 72.00 %    Lymphocytes 29.10 22.00 - 41.00 %    Monocytes 7.20 0.00 - 13.40 %    Eosinophils 0.90 0.00 - 6.90 %    Basophils 0.50 0.00 - 1.80 %    Immature Granulocytes 0.30 0.00 - 0.90 %    Nucleated RBC 0.00 /100 WBC    Neutrophils (Absolute) 4.62 2.00 - 7.15 K/uL    Lymphs (Absolute) 2.17 1.00 - 4.80 K/uL    Monos (Absolute) 0.54 0.00 - 0.85 K/uL    Eos (Absolute) 0.07 0.00 - 0.51 K/uL    Baso (Absolute) 0.04 0.00 - 0.12 K/uL    Immature Granulocytes (abs) 0.02 0.00 - 0.11 K/uL    NRBC (Absolute) 0.00 K/uL   COMP METABOLIC PANEL   Result Value Ref Range    Sodium 136 135 - 145 mmol/L    Potassium 3.9 3.6 - 5.5 mmol/L    Chloride 101 96 - 112 mmol/L    Co2 22 20 - 33 mmol/L    Anion Gap 13.0 7.0 - 16.0    Glucose 88 65 - 99 mg/dL    Bun 13 8 - 22 mg/dL    Creatinine 0.60 0.50 - 1.40  mg/dL    Calcium 9.4 8.5 - 10.5 mg/dL    AST(SGOT) 22 12 - 45 U/L    ALT(SGPT) 19 2 - 50 U/L    Alkaline Phosphatase 54 30 - 99 U/L    Total Bilirubin 0.6 0.1 - 1.5 mg/dL    Albumin 4.3 3.2 - 4.9 g/dL    Total Protein 7.8 6.0 - 8.2 g/dL    Globulin 3.5 1.9 - 3.5 g/dL    A-G Ratio 1.2 g/dL   TROPONIN   Result Value Ref Range    Troponin T <6 6 - 19 ng/L   BETA-HCG QUALITATIVE SERUM   Result Value Ref Range    Beta-Hcg Qualitative Serum Negative Negative   URINALYSIS,CULTURE IF INDICATED   Result Value Ref Range    Color Yellow     Character Clear     Specific Gravity 1.011 <1.035    Ph 7.0 5.0 - 8.0    Glucose Negative Negative mg/dL    Ketones 15 (A) Negative mg/dL    Protein Negative Negative mg/dL    Bilirubin Negative Negative    Urobilinogen, Urine 1.0 Negative    Nitrite Negative Negative    Leukocyte Esterase Negative Negative    Occult Blood Negative Negative    Micro Urine Req see below    ESTIMATED GFR   Result Value Ref Range    GFR If African American >60 >60 mL/min/1.73 m 2    GFR If Non African American >60 >60 mL/min/1.73 m 2   EKG   Result Value Ref Range    Report       [facility] Emergency Dept.    Test Date:  2020  Pt Name:    LYNN MONZON               Department: ER  MRN:        [id number]                      Room:       [facility]  Gender:     Female                       Technician: thang  :        1980                   Requested By:ER TRIAGE PROTOCOL  Order #:    [id number]                    Reading MD:    Measurements  Intervals                                Axis  Rate:       75                           P:          59  DC:         124                          QRS:        68  QRSD:       88                           T:          43  QT:         424  QTc:        474    Interpretive Statements  SINUS RHYTHM  RSR' IN V1 OR V2, PROBABLY NORMAL VARIANT  Compared to ECG 2019 14:00:37  ST (T wave) deviation no longer present       CT-HEAD W/O   Final Result      No  acute intracranial abnormality is identified.      Mild paranasal sinus disease.        Ekg; normal sinus rhythm at a rate of 75.  No ST elevation, no ST depression, no ectopy.    PROCEDURES     MEDICAL RECORD  I have reviewed patient's medical record and pertinent results are listed.    COURSE & MEDICAL DECISION MAKING  I have reviewed any medical record information, laboratory studies and radiographic results as noted above.    The pt will be admitted to Dr. Padron for further evaluation and treatment.       FINAL IMPRESSION  TIA          Electronically signed by: Roberto Mercedes D.O., 4/30/2020 3:55 PM

## 2020-04-30 NOTE — H&P
Hospital Medicine History & Physical Note    Date of Service  4/30/2020    Primary Care Physician  Mahsa La M.D.    Code Status  Full Code    Chief Complaint  Chief Complaint   Patient presents with   • Numbness     right arm        History of Presenting Illness  39 y.o. female who presented 4/30/2020 with past medical history of anxiety and depression comes into the emergency room with complaints of numbness.  Patient states that the numbness started 1 week ago at her hand and progressively went up to R arm.  Now she states he feels it underneath her eyelids of her face.  She is also starting to feel numbness of her lower extremity.  Patient states that the right side of her body is numbness is worse than the left side.  Patient has chronic neck pain and back pain.  She is been exercising more frequently.  She states that she has been staying at home and has no sick contacts.  1 year ago she started taking fluoxetine and her dose has been increased recently.  Patient denies any muscle weakness, fever, chills, nausea, vomiting, diarrhea, abdominal pain.  Patient arrived to the hospital with normal vital signs.  EKG interpreted by me found normal sinus rhythm  CT scan of the head found no acute intracranial process  Review of Systems  Review of Systems   Constitutional: Negative for chills, diaphoresis, fever and malaise/fatigue.   HENT: Negative for congestion, ear discharge, ear pain, hearing loss, nosebleeds, sinus pain, sore throat and tinnitus.    Eyes: Negative for blurred vision, double vision, photophobia and pain.   Respiratory: Negative for cough, hemoptysis, sputum production, shortness of breath, wheezing and stridor.    Cardiovascular: Negative for chest pain, palpitations, orthopnea, claudication, leg swelling and PND.   Gastrointestinal: Negative for abdominal pain, blood in stool, constipation, diarrhea, heartburn, melena, nausea and vomiting.   Genitourinary: Negative for dysuria, flank pain,  frequency, hematuria and urgency.   Musculoskeletal: Negative for back pain, falls, joint pain, myalgias and neck pain.   Skin: Negative for itching and rash.   Neurological: Positive for sensory change. Negative for dizziness, tingling, tremors, weakness and headaches.   Endo/Heme/Allergies: Negative for environmental allergies and polydipsia. Does not bruise/bleed easily.   Psychiatric/Behavioral: Positive for depression. Negative for hallucinations, substance abuse and suicidal ideas. The patient is nervous/anxious.        Past Medical History   has a past medical history of Back pain, Depression, Indigestion, and Seizure ([facility]).    Surgical History   has a past surgical history that includes primary c section and gyn surgery.     Family History  family history includes COPD in her father; Heart Failure in her maternal grandfather; No Known Problems in her mother.     Social History   reports that she has quit smoking. Her smoking use included cigarettes. She smoked 0.00 packs per day. She has never used smokeless tobacco. She reports current alcohol use. She reports that she does not use drugs.    Allergies  Allergies   Allergen Reactions   • Prednisone Anxiety       Medications  Prior to Admission Medications   Prescriptions Last Dose Informant Patient Reported? Taking?   LORazepam (ATIVAN) 1 MG Tab 2020 at 1200 Patient Yes No   Sig: Take 1 mg by mouth 1 time daily as needed for Anxiety.   fluoxetine (PROZAC) 40 MG capsule 2020 at AFTERNOON Patient No No   Sig: TAKE 1 CAPSULE BY MOUTH EVERY DAY   ibuprofen (MOTRIN) 200 MG Tab PRN at [facility] Patient Yes Yes   Sig: Take 400 mg by mouth every 6 hours as needed.   levonorgestrel (MIRENA, 52 MG,) 20 MCG/24HR IUD ABOUT 5 YEARS AGO at [facility] Patient Yes No   Si Each by Intrauterine route Once.      Facility-Administered Medications: None       Physical Exam  Temp:  [36.2 °C (97.1 °F)-37.5 °C (99.5 °F)] 36.8 °C (98.2 °F)  Pulse:  [65-92] 75  Resp:  [15-27]  16  BP: (106-142)/(71-97) 115/71  SpO2:  [96 %-98 %] 96 %    Physical Exam  Vitals signs and nursing note reviewed.   Constitutional:       General: She is not in acute distress.     Appearance: Normal appearance. She is not ill-appearing, toxic-appearing or diaphoretic.   HENT:      Head: Normocephalic and atraumatic.      Nose: No congestion or rhinorrhea.      Mouth/Throat:      Pharynx: No oropharyngeal exudate or posterior oropharyngeal erythema.   Eyes:      General: No scleral icterus.  Neck:      Musculoskeletal: No neck rigidity or muscular tenderness.      Vascular: No carotid bruit.   Cardiovascular:      Rate and Rhythm: Normal rate and regular rhythm.      Pulses: Normal pulses.      Heart sounds: Normal heart sounds. No murmur. No friction rub. No gallop.    Pulmonary:      Effort: Pulmonary effort is normal. No respiratory distress.      Breath sounds: Normal breath sounds. No stridor. No wheezing or rhonchi.   Abdominal:      General: Abdomen is flat. There is no distension.      Palpations: There is no mass.      Tenderness: There is no abdominal tenderness. There is no left CVA tenderness, guarding or rebound.      Hernia: No hernia is present.   Musculoskeletal: Normal range of motion.         General: No swelling.      Right lower leg: No edema.      Left lower leg: No edema.   Lymphadenopathy:      Cervical: No cervical adenopathy.   Skin:     General: Skin is warm and dry.      Capillary Refill: Capillary refill takes more than 3 seconds.      Coloration: Skin is not jaundiced or pale.      Findings: No bruising or erythema.   Neurological:      Mental Status: She is alert.      Sensory: Sensory deficit present.         Laboratory:  Recent Labs     04/30/20  1220   WBC 7.5   RBC 3.92*   HEMOGLOBIN 12.7   HEMATOCRIT 39.3   .3*   MCH 32.4   MCHC 32.3*   RDW 46.2   PLATELETCT 241   MPV 9.8     Recent Labs     04/30/20  1220   SODIUM 136   POTASSIUM 3.9   CHLORIDE 101   CO2 22   GLUCOSE 88    BUN 13   CREATININE 0.60   CALCIUM 9.4     Recent Labs     04/30/20  1220   ALTSGPT 19   ASTSGOT 22   ALKPHOSPHAT 54   TBILIRUBIN 0.6   GLUCOSE 88         No results for input(s): NTPROBNP in the last 72 hours.      Recent Labs     04/30/20  1220   TROPONINT <6       Imaging:  CT-HEAD W/O   Final Result      No acute intracranial abnormality is identified.      Mild paranasal sinus disease.      MR-BRAIN-WITH    (Results Pending)   MR-CERVICAL SPINE-WITH    (Results Pending)         Assessment/Plan:    * Numbness  Assessment & Plan  Patient complains of sudden onset of numbness started 1 week prior and progressively getting worse  Rule out stroke MRI of the brain ordered  Aspirin ordered  Possibly due to fluoxetine since is a high dose?  Check B12  Patient will need outpatient neurology evaluation if stroke is negative    Neck pain  Assessment & Plan  I have ordered a cervical MRI to rule out cervical radiculopathy    Anxiety  Assessment & Plan  Continue fluoxetine and Ativan

## 2020-05-01 ENCOUNTER — APPOINTMENT (OUTPATIENT)
Dept: RADIOLOGY | Facility: MEDICAL CENTER | Age: 40
End: 2020-05-01
Attending: HOSPITALIST
Payer: COMMERCIAL

## 2020-05-01 ENCOUNTER — PATIENT MESSAGE (OUTPATIENT)
Dept: HEALTH INFORMATION MANAGEMENT | Facility: OTHER | Age: 40
End: 2020-05-01

## 2020-05-01 VITALS
WEIGHT: 192.46 LBS | DIASTOLIC BLOOD PRESSURE: 78 MMHG | BODY MASS INDEX: 28.51 KG/M2 | SYSTOLIC BLOOD PRESSURE: 122 MMHG | HEIGHT: 69 IN | TEMPERATURE: 97.7 F | HEART RATE: 81 BPM | RESPIRATION RATE: 16 BRPM | OXYGEN SATURATION: 94 %

## 2020-05-01 PROBLEM — R20.0 NUMBNESS: Status: ACTIVE | Noted: 2020-05-01

## 2020-05-01 PROBLEM — M54.2 NECK PAIN: Status: RESOLVED | Noted: 2020-05-01 | Resolved: 2020-05-01

## 2020-05-01 PROBLEM — F41.9 ANXIETY: Status: ACTIVE | Noted: 2020-05-01

## 2020-05-01 PROBLEM — R20.0 NUMBNESS: Status: RESOLVED | Noted: 2020-05-01 | Resolved: 2020-05-01

## 2020-05-01 PROBLEM — M54.2 NECK PAIN: Status: ACTIVE | Noted: 2020-05-01

## 2020-05-01 LAB
ALBUMIN SERPL BCP-MCNC: 3.7 G/DL (ref 3.2–4.9)
ALBUMIN/GLOB SERPL: 1.1 G/DL
ALP SERPL-CCNC: 59 U/L (ref 30–99)
ALT SERPL-CCNC: 18 U/L (ref 2–50)
ANION GAP SERPL CALC-SCNC: 12 MMOL/L (ref 7–16)
AST SERPL-CCNC: 17 U/L (ref 12–45)
BASOPHILS # BLD AUTO: 0.5 % (ref 0–1.8)
BASOPHILS # BLD: 0.04 K/UL (ref 0–0.12)
BILIRUB SERPL-MCNC: 0.3 MG/DL (ref 0.1–1.5)
BUN SERPL-MCNC: 16 MG/DL (ref 8–22)
CALCIUM SERPL-MCNC: 9.1 MG/DL (ref 8.5–10.5)
CHLORIDE SERPL-SCNC: 100 MMOL/L (ref 96–112)
CHOLEST SERPL-MCNC: 204 MG/DL (ref 100–199)
CO2 SERPL-SCNC: 22 MMOL/L (ref 20–33)
CREAT SERPL-MCNC: 0.61 MG/DL (ref 0.5–1.4)
EOSINOPHIL # BLD AUTO: 0.2 K/UL (ref 0–0.51)
EOSINOPHIL NFR BLD: 2.3 % (ref 0–6.9)
ERYTHROCYTE [DISTWIDTH] IN BLOOD BY AUTOMATED COUNT: 47.2 FL (ref 35.9–50)
EST. AVERAGE GLUCOSE BLD GHB EST-MCNC: 103 MG/DL
GLOBULIN SER CALC-MCNC: 3.5 G/DL (ref 1.9–3.5)
GLUCOSE SERPL-MCNC: 100 MG/DL (ref 65–99)
HBA1C MFR BLD: 5.2 % (ref 0–5.6)
HCT VFR BLD AUTO: 41 % (ref 37–47)
HDLC SERPL-MCNC: 68 MG/DL
HGB BLD-MCNC: 13.4 G/DL (ref 12–16)
IMM GRANULOCYTES # BLD AUTO: 0.01 K/UL (ref 0–0.11)
IMM GRANULOCYTES NFR BLD AUTO: 0.1 % (ref 0–0.9)
LDLC SERPL CALC-MCNC: 107 MG/DL
LYMPHOCYTES # BLD AUTO: 3.39 K/UL (ref 1–4.8)
LYMPHOCYTES NFR BLD: 39.3 % (ref 22–41)
MCH RBC QN AUTO: 32.8 PG (ref 27–33)
MCHC RBC AUTO-ENTMCNC: 32.7 G/DL (ref 33.6–35)
MCV RBC AUTO: 100.2 FL (ref 81.4–97.8)
MONOCYTES # BLD AUTO: 0.57 K/UL (ref 0–0.85)
MONOCYTES NFR BLD AUTO: 6.6 % (ref 0–13.4)
NEUTROPHILS # BLD AUTO: 4.42 K/UL (ref 2–7.15)
NEUTROPHILS NFR BLD: 51.2 % (ref 44–72)
NRBC # BLD AUTO: 0 K/UL
NRBC BLD-RTO: 0 /100 WBC
PLATELET # BLD AUTO: 242 K/UL (ref 164–446)
PMV BLD AUTO: 9.6 FL (ref 9–12.9)
POTASSIUM SERPL-SCNC: 3.7 MMOL/L (ref 3.6–5.5)
PROT SERPL-MCNC: 7.2 G/DL (ref 6–8.2)
RBC # BLD AUTO: 4.09 M/UL (ref 4.2–5.4)
SODIUM SERPL-SCNC: 134 MMOL/L (ref 135–145)
TRIGL SERPL-MCNC: 144 MG/DL (ref 0–149)
WBC # BLD AUTO: 8.6 K/UL (ref 4.8–10.8)

## 2020-05-01 PROCEDURE — 85025 COMPLETE CBC W/AUTO DIFF WBC: CPT

## 2020-05-01 PROCEDURE — 700117 HCHG RX CONTRAST REV CODE 255: Performed by: INTERNAL MEDICINE

## 2020-05-01 PROCEDURE — 72156 MRI NECK SPINE W/O & W/DYE: CPT

## 2020-05-01 PROCEDURE — 97165 OT EVAL LOW COMPLEX 30 MIN: CPT

## 2020-05-01 PROCEDURE — 97161 PT EVAL LOW COMPLEX 20 MIN: CPT

## 2020-05-01 PROCEDURE — A9270 NON-COVERED ITEM OR SERVICE: HCPCS | Performed by: HOSPITALIST

## 2020-05-01 PROCEDURE — 70553 MRI BRAIN STEM W/O & W/DYE: CPT

## 2020-05-01 PROCEDURE — 80061 LIPID PANEL: CPT

## 2020-05-01 PROCEDURE — 36415 COLL VENOUS BLD VENIPUNCTURE: CPT

## 2020-05-01 PROCEDURE — 83036 HEMOGLOBIN GLYCOSYLATED A1C: CPT

## 2020-05-01 PROCEDURE — A9576 INJ PROHANCE MULTIPACK: HCPCS | Performed by: INTERNAL MEDICINE

## 2020-05-01 PROCEDURE — 99217 PR OBSERVATION CARE DISCHARGE: CPT | Performed by: INTERNAL MEDICINE

## 2020-05-01 PROCEDURE — 700102 HCHG RX REV CODE 250 W/ 637 OVERRIDE(OP): Performed by: HOSPITALIST

## 2020-05-01 PROCEDURE — G0378 HOSPITAL OBSERVATION PER HR: HCPCS

## 2020-05-01 PROCEDURE — 80053 COMPREHEN METABOLIC PANEL: CPT

## 2020-05-01 RX ORDER — ASPIRIN 81 MG/1
324 TABLET, CHEWABLE ORAL DAILY
Status: DISCONTINUED | OUTPATIENT
Start: 2020-05-01 | End: 2020-05-01 | Stop reason: HOSPADM

## 2020-05-01 RX ORDER — LORAZEPAM 2 MG/ML
0.5 INJECTION INTRAMUSCULAR
Status: DISCONTINUED | OUTPATIENT
Start: 2020-05-01 | End: 2020-05-01 | Stop reason: HOSPADM

## 2020-05-01 RX ORDER — ASPIRIN 300 MG/1
300 SUPPOSITORY RECTAL DAILY
Status: DISCONTINUED | OUTPATIENT
Start: 2020-05-01 | End: 2020-05-01 | Stop reason: HOSPADM

## 2020-05-01 RX ORDER — ASPIRIN 325 MG
325 TABLET ORAL DAILY
Status: DISCONTINUED | OUTPATIENT
Start: 2020-05-01 | End: 2020-05-01 | Stop reason: HOSPADM

## 2020-05-01 RX ADMIN — GADOTERIDOL 20 ML: 279.3 INJECTION, SOLUTION INTRAVENOUS at 12:09

## 2020-05-01 RX ADMIN — ASPIRIN 325 MG: 325 TABLET, FILM COATED ORAL at 04:04

## 2020-05-01 ASSESSMENT — COGNITIVE AND FUNCTIONAL STATUS - GENERAL
SUGGESTED CMS G CODE MODIFIER DAILY ACTIVITY: CH
DAILY ACTIVITIY SCORE: 24
SUGGESTED CMS G CODE MODIFIER MOBILITY: CH
MOBILITY SCORE: 24

## 2020-05-01 ASSESSMENT — GAIT ASSESSMENTS
GAIT LEVEL OF ASSIST: MODIFIED INDEPENDENT
DISTANCE (FEET): 150

## 2020-05-01 ASSESSMENT — ACTIVITIES OF DAILY LIVING (ADL): TOILETING: INDEPENDENT

## 2020-05-01 ASSESSMENT — FIBROSIS 4 INDEX: FIB4 SCORE: 0.66

## 2020-05-01 NOTE — PROGRESS NOTES
1820 Patient arrived to unit from ED.   • Received report from ED   • Patient a & o x 4.   • Pt states numbness to right face, right arm, left toes (first 2 digits) and left arm  • CT showing No acute intracranial abnormality is identified.Mild paranasal sinus disease.  • Voiding w/o difficulty  • Diet regular  • Pt states pain 0/10  • No skin breakdown  • Patient up self, ambulating with no assistance, gait steady.   • Patient oriented to room, surroundings and call bell. Bed alarm off. Bed in lowest position, locked and upper side rails up. Patient demonstrated correct use of call bell. Call bell/belongings within reach. Patient educated on hourly rounding.   • Plan   o MRI    1845  Report given to night shift RN.

## 2020-05-01 NOTE — PROGRESS NOTES
Monitor Summary: Rhythm SR, HR 59-66, GA 0.12, QRS 0.10, QT 0.42 per monitor strip from monitoring room.

## 2020-05-01 NOTE — ASSESSMENT & PLAN NOTE
Patient complains of sudden onset of numbness started 1 week prior and progressively getting worse  Rule out stroke MRI of the brain ordered  Aspirin ordered  Possibly due to fluoxetine since is a high dose?  Check B12  Patient will need outpatient neurology evaluation if stroke is negative

## 2020-05-01 NOTE — PROGRESS NOTES
D/c instructions given to pt regarding medications and follow up appt.  Educated on worsening s/s, pt understands instructions and questions answered. Vss, sats >90 on RA

## 2020-05-01 NOTE — DISCHARGE INSTRUCTIONS
Discharge Instructions    Discharged to home by car with relative. Discharged via walking, hospital escort: Yes.  Special equipment needed: Not Applicable    Be sure to schedule a follow-up appointment with your primary care doctor or any specialists as instructed.     Discharge Plan:   Diet Plan: Discussed  Activity Level: Discussed  Smoking Cessation Offered: Patient Counseled  Confirmed Follow up Appointment: Patient to Call and Schedule Appointment  Confirmed Symptoms Management: Discussed  Medication Reconciliation Updated: Yes  Influenza Vaccine Indication: Not indicated: Previously immunized this influenza season and > 8 years of age    I understand that a diet low in cholesterol, fat, and sodium is recommended for good health. Unless I have been given specific instructions below for another diet, I accept this instruction as my diet prescription.   Other diet: regular    Special Instructions: None    · Is patient discharged on Warfarin / Coumadin?   No     Paresthesia  Introduction  Paresthesia is an abnormal burning or prickling sensation. This sensation is generally felt in the hands, arms, legs, or feet. However, it may occur in any part of the body. Usually, it is not painful. The feeling may be described as:  · Tingling or numbness.  · Pins and needles.  · Skin crawling.  · Buzzing.  · Limbs falling asleep.  · Itching.  Most people experience temporary (transient) paresthesia at some time in their lives. Paresthesia may occur when you breathe too quickly (hyperventilation). It can also occur without any apparent cause. Commonly, paresthesia occurs when pressure is placed on a nerve. The sensation quickly goes away after the pressure is removed. For some people, however, paresthesia is a long-lasting (chronic) condition that is caused by an underlying disorder. If you continue to have paresthesia, you may need further medical evaluation.  Follow these instructions at home:  Watch your condition for any  changes. Taking the following actions may help to lessen any discomfort that you are feeling:  · Avoid drinking alcohol.  · Try acupuncture or massage to help relieve your symptoms.  · Keep all follow-up visits as directed by your health care provider. This is important.  Contact a health care provider if:  · You continue to have episodes of paresthesia.  · Your burning or prickling feeling gets worse when you walk.  · You have pain, cramps, or dizziness.  · You develop a rash.  Get help right away if:  · You feel weak.  · You have trouble walking or moving.  · You have problems with speech, understanding, or vision.  · You feel confused.  · You cannot control your bladder or bowel movements.  · You have numbness after an injury.  · You faint.  This information is not intended to replace advice given to you by your health care provider. Make sure you discuss any questions you have with your health care provider.  Document Released: 12/08/2003 Document Revised: 05/25/2017 Document Reviewed: 12/14/2015  © 2017 [employer]      Depression / Suicide Risk    As you are discharged from this [facility] facility, it is important to learn how to keep safe from harming yourself.    Recognize the warning signs:  · Abrupt changes in personality, positive or negative- including increase in energy   · Giving away possessions  · Change in eating patterns- significant weight changes-  positive or negative  · Change in sleeping patterns- unable to sleep or sleeping all the time   · Unwillingness or inability to communicate  · Depression  · Unusual sadness, discouragement and loneliness  · Talk of wanting to die  · Neglect of personal appearance   · Rebelliousness- reckless behavior  · Withdrawal from people/activities they love  · Confusion- inability to concentrate     If you or a loved one observes any of these behaviors or has concerns about self-harm, here's what you can do:  · Talk about it- your feelings and reasons for  harming yourself  · Remove any means that you might use to hurt yourself (examples: pills, rope, extension cords, firearm)  · Get professional help from the community (Mental Health, Substance Abuse, psychological counseling)  · Do not be alone:Call your Safe Contact- someone whom you trust who will be there for you.  · Call your local CRISIS HOTLINE 324-1066 or 846-185-8226  · Call your local Children's Mobile Crisis Response Team [place] (953) 903-0721 or www.MymCart  · Call the toll free National Suicide Prevention Hotlines   · National Suicide Prevention Lifeline 320-924-LGDZ (1118)  · National Hope Line Network 800-SUICIDE (559-7010)

## 2020-05-01 NOTE — CARE PLAN
Problem: Safety  Goal: Will remain free from falls  Outcome: PROGRESSING AS EXPECTED     Problem: Venous Thromboembolism (VTW)/Deep Vein Thrombosis (DVT) Prevention:  Goal: Patient will participate in Venous Thrombosis (VTE)/Deep Vein Thrombosis (DVT)Prevention Measures  Outcome: PROGRESSING AS EXPECTED     Problem: Knowledge Deficit  Goal: Knowledge of disease process/condition, treatment plan, diagnostic tests, and medications will improve  Outcome: PROGRESSING AS EXPECTED

## 2020-05-01 NOTE — DISCHARGE SUMMARY
Discharge Summary    CHIEF COMPLAINT ON ADMISSION  Chief Complaint   Patient presents with   • Numbness     right arm        Reason for Admission  Numbness     Admission Date  4/30/2020    CODE STATUS  Full Code    HPI & HOSPITAL COURSE  This is a 39 y.o. female here with R arm numbness.    Pt presented 4/30/2020 with past medical history of anxiety and depression came into the emergency room with complaints of numbness.  Numbness started 1 week ago at her hand and progressively went up to R arm, then underneath her eyelids of her face.  She also started to feel numbness of her lower extremity.  Patient stated that the right side of her body  numbness worse than the left side.  Patient has chronic neck pain and back pain.  She has been exercising more frequently. She had been staying at home and has no sick contacts.  1 year ago she started taking fluoxetine and her dose was increased recently.  Patient denied any muscle weakness, fever, chills, nausea, vomiting, diarrhea, abdominal pain. Nl VSS in ER,  CT scan of the head found no acute intracranial process.    Sx largely resolved overnight.  5/1 am with residual R arm numbness.  Did not appear anxious.  MRI head normal.  MRI cervical spine showed:  1.  Minimal cervical spondylotic changes at C5-6 level.  2.  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  3.  No evidence of significant disc extrusion, central canal stenosis, or neural foraminal stenosis.     Therefore, she is discharged in good and stable condition to home with close outpatient follow-up.    The patient recovered much more quickly than anticipated on admission.    Discharge Date  5/1/2020    FOLLOW UP ITEMS POST DISCHARGE  PCP 1 wk    DISCHARGE DIAGNOSES  Principal Problem (Resolved):    Numbness POA: Yes  Active Problems:    Anxiety POA: Unknown  Resolved Problems:    Neck pain POA: Yes      FOLLOW UP  Future Appointments   Date Time Provider  Department Center   5/21/2020  9:30 AM Mile Tamayo M.D. OBGYN [address] [facility] [address]         MEDICATIONS ON DISCHARGE     Medication List      CONTINUE taking these medications      Instructions   fluoxetine 40 MG capsule  Commonly known as:  PROZAC   TAKE 1 CAPSULE BY MOUTH EVERY DAY     ibuprofen 200 MG Tabs  Commonly known as:  MOTRIN   Take 400 mg by mouth every 6 hours as needed.  Dose:  400 mg     LORazepam 1 MG Tabs  Commonly known as:  ATIVAN   Take 1 mg by mouth 1 time daily as needed for Anxiety.  Dose:  1 mg     Mirena (52 MG) 52 mg (20 mcg/24 hr) IUD  Generic drug:  levonorgestrel   1 Each by Intrauterine route Once.  Dose:  1 Each            Allergies  Allergies   Allergen Reactions   • Prednisone Anxiety       DIET  Orders Placed This Encounter   Procedures   • Diet Order Regular     Standing Status:   Standing     Number of Occurrences:   1     Order Specific Question:   Diet:     Answer:   Regular [1]       ACTIVITY  As tolerated.  Weight bearing as tolerated    CONSULTATIONS  None    PROCEDURES  Head CT  MRI brain, cervical spine    LABORATORY  Lab Results   Component Value Date    SODIUM 134 (L) 05/01/2020    POTASSIUM 3.7 05/01/2020    CHLORIDE 100 05/01/2020    CO2 22 05/01/2020    GLUCOSE 100 (H) 05/01/2020    BUN 16 05/01/2020    CREATININE 0.61 05/01/2020        Lab Results   Component Value Date    WBC 8.6 05/01/2020    HEMOGLOBIN 13.4 05/01/2020    HEMATOCRIT 41.0 05/01/2020    PLATELETCT 242 05/01/2020

## 2020-05-04 DIAGNOSIS — F41.9 ANXIETY: ICD-10-CM

## 2020-05-05 RX ORDER — LORAZEPAM 1 MG/1
1 TABLET ORAL
Qty: 30 TAB | Refills: 0 | Status: SHIPPED | OUTPATIENT
Start: 2020-05-05 | End: 2020-07-08 | Stop reason: SDUPTHER

## 2020-05-14 ENCOUNTER — TELEMEDICINE (OUTPATIENT)
Dept: MEDICAL GROUP | Facility: PHYSICIAN GROUP | Age: 40
End: 2020-05-14
Payer: COMMERCIAL

## 2020-05-14 DIAGNOSIS — R20.2 PARESTHESIA: ICD-10-CM

## 2020-05-14 DIAGNOSIS — G44.009 CLUSTER HEADACHE, NOT INTRACTABLE, UNSPECIFIED CHRONICITY PATTERN: ICD-10-CM

## 2020-05-14 DIAGNOSIS — G43.509 PERSISTENT MIGRAINE AURA WITHOUT CEREBRAL INFARCTION AND WITHOUT STATUS MIGRAINOSUS, NOT INTRACTABLE: ICD-10-CM

## 2020-05-14 DIAGNOSIS — D75.89 MACROCYTOSIS WITHOUT ANEMIA: ICD-10-CM

## 2020-05-14 PROCEDURE — 99214 OFFICE O/P EST MOD 30 MIN: CPT | Mod: 95,CR | Performed by: FAMILY MEDICINE

## 2020-05-14 RX ORDER — TOPIRAMATE 25 MG/1
TABLET ORAL
Qty: 60 TAB | Refills: 1 | Status: SHIPPED | OUTPATIENT
Start: 2020-05-14 | End: 2020-11-01

## 2020-05-14 NOTE — PROGRESS NOTES
Chief Complaint   Patient presents with   • Numbness     rt arm numbness;er fv        HISTORY OF PRESENT ILLNESS: Patient is a 40 y.o. female established patient here today for the following concerns:    This encounter was conducted via Zoom .   Verbal consent was obtained. Patient's identity was verified.      1. Cluster headache, not intractable, unspecified chronicity pattern  2. Persistent migraine aura without cerebral infarction and without status migrainosus, not intractable  3. Macrocytosis without anemia  4. Paresthesia    Here today on conference for Hospital follow up for headaches associated with paresthesias in the right arm, face, and bilateral lower extremities that was worsening.  She has hx of severe panic attack that caused carpal pedal spasm that was labeled in the chart as a seizure, but after work up was determined more likely to be anxiety.  She also has hx of migraines.  In the past had been treated with imitrex for abortive therapy, but reports took one dose and had a very abnormal sensation in the neck and could not tolerate it therefore never took another migraine medicaton.  She reports during these last episodes, had been having migraine headaches on and off.  In the hospital she underwent lab testing, MR brain and C spine which revealed    FINDINGS:  The calvariae are unremarkable. There are no extra-axial fluid collections. The ventricular system and basal cisterns are within normal limits. There are no areas of abnormal signal in the brain substance. There are no mass effects or shift of midline   structures. There are no hemorrhagic lesions. The diffusion-weighted axial images show no evidence of acute cerebral infarction.     The postcontrast images show no areas of abnormal parenchymal or meningeal enhancement.     The brainstem and posterior fossa structures are unremarkable.     Vascular flow voids in the vertebrobasilar and carotid arteries, [place], and dural venous  sinuses are intact.     The paranasal sinuses and mastoids in the field of view are unremarkable.     IMPRESSION:     MRI of the brain without and with contrast within normal limits.      5/1/2020 11:15 AM     HISTORY/REASON FOR EXAM:  Neck pain, abnormal neuro exam; Neck pain, initial exam; Cervical radiculopathy.        TECHNIQUE/EXAM DESCRIPTION:  MRI of the cervical spine without and with contrast.     The study was performed on a Improve Digital Signa 1.5 Nessa MRI scanner.  T1 sagittal, T2 fast spin-echo sagittal, T2 fat suppressed sagittal and gradient echo axial images were obtained of the cervical spine. Optional T2 axial images may also be obtained. T1 post-contrast fat suppressed sagittal images were obtained of the   cervical spine. Optional T1 post-contrast fat-suppressed axial images may be obtained.     20 mL ProHance contrast was administered intravenously.     COMPARISON: None.     FINDINGS:  Alignment in the cervical spine is normal. Marrow signal in the vertebral bodies is within normal limits. There is no abnormal osseous enhancement or other abnormal extradural enhancement. The disc spaces are intact at all cervical levels. There is a   small chronic Schmorl's node endplate herniation into the inferior endplate of the C4 vertebral body There are no significant osteophytic changes. The prevertebral and paraspinous soft tissues are unremarkable.     There are no anomalies at the craniovertebral junction. The cervical spinal cord is normal in caliber and signal throughout its course. There is no abnormal cord enhancement. There is no abnormal intradural extramedullary enhancement.     There is minimal posterior spurring at the C5-6 level. At C2-3 through C7-T1 there is no significant disc bulge or protrusion. The neural foramina are intact at all cervical levels. There is no congenital or acquired central spinal stenosis at any   cervical level. There is no significant facet  arthropathy.     IMPRESSION:        1.  Minimal cervical spondylotic changes at C5-6 level.     2.  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     3.  No evidence of significant disc extrusion, central canal stenosis, or neural foraminal stenosis.      Past Medical, Social, and Family history reviewed and updated in EPIC    Allergies:Prednisone    Current Outpatient Medications   Medication Sig Dispense Refill   • topiramate (TOPAMAX) 25 MG Tab Topamax 25 mg at bed time for 14 days, then increase to 25 mg BID 60 Tab 1   • LORazepam (ATIVAN) 1 MG Tab Take 1 Tab by mouth 1 time daily as needed for Anxiety for up to 30 days. 30 Tab 0   • fluoxetine (PROZAC) 40 MG capsule TAKE 1 CAPSULE BY MOUTH EVERY DAY 90 Cap 0   • levonorgestrel (MIRENA, 52 MG,) 20 MCG/24HR IUD 1 Each by Intrauterine route Once.       No current facility-administered medications for this visit.          ROS:  Review of Systems   Constitutional: Negative for fever, chills, weight loss and +malaise/fatigue.   HENT: Negative for ear pain, nosebleeds, congestion, sore throat and neck pain.    Eyes: Negative for blurred vision.   Respiratory: Negative for cough, sputum production, shortness of breath and wheezing.    Cardiovascular: Negative for chest pain, palpitations,  and leg swelling.   Gastrointestinal: Negative for heartburn, nausea, vomiting, diarrhea and abdominal pain.   Genitourinary: Negative for dysuria, urgency and frequency.   Musculoskeletal: Negative for myalgias, back pain and joint pain.   Skin: Negative for rash and itching.   Neurological: Negative for dizziness, tingling, tremors, +sensory change, no focal weakness and+ headaches.   Endo/Heme/Allergies: Does not bruise/bleed easily.   Psychiatric/Behavioral: Negative for depression, anxiety, suicidal ideas, insomnia and memory loss.      Exam:  Afebrile per patient, RR estimated at 12    General:  Well nourished, well developed in  NAD  Head is grossly normal.  Neck: Supple without JVD, Trachea midline, no thyromegaly noted  Pulmonary:  Normal effort. Speaking in full sentences  Psych: affect appropriate  Skin: no Jaundice noted or facial rashes    Please note that this dictation was created using voice recognition software. I have made every reasonable attempt to correct obvious errors, but I expect that there are errors of grammar and possibly content that I did not discover before finalizing the note.    Assessment/Plan:  1. Cluster headache, not intractable, unspecified chronicity pattern  2. Persistent migraine aura without cerebral infarction and without status migrainosus, not intractable  Suspect that these are complex migraines.  Will start   - topiramate (TOPAMAX) 25 MG Tab; Topamax 25 mg at bed time for 14 days, then increase to 25 mg BID  Dispense: 60 Tab; Refill: 1  - REFERRAL TO NEUROLOGY    3. Macrocytosis without anemia  R/o b vitamin deficiency causing paresthesias.   - VITAMIN B12; Future  - FOLATE; Future  - VITAMIN B6; Future  - MAGNESIUM; Future    4. Paresthesia  - VITAMIN B12; Future  - VITAMIN D,25 HYDROXY; Future  - HOMOCYSTEINE; Future  - FOLATE; Future  - VITAMIN B6; Future  - MAGNESIUM; Future    May start magnesium supplementation to help with migraines as well at 400 mg daily.     Follow up in 1 month, sooner prn.

## 2020-05-22 ENCOUNTER — HOSPITAL ENCOUNTER (OUTPATIENT)
Dept: LAB | Facility: MEDICAL CENTER | Age: 40
End: 2020-05-22
Attending: FAMILY MEDICINE
Payer: COMMERCIAL

## 2020-05-22 DIAGNOSIS — R20.2 PARESTHESIA: ICD-10-CM

## 2020-05-22 DIAGNOSIS — D75.89 MACROCYTOSIS WITHOUT ANEMIA: ICD-10-CM

## 2020-05-22 LAB
25(OH)D3 SERPL-MCNC: 23 NG/ML (ref 30–100)
FOLATE SERPL-MCNC: 8.9 NG/ML
HCYS SERPL-SCNC: 8.46 UMOL/L
MAGNESIUM SERPL-MCNC: 2.1 MG/DL (ref 1.5–2.5)
VIT B12 SERPL-MCNC: 425 PG/ML (ref 211–911)

## 2020-05-22 PROCEDURE — 83090 ASSAY OF HOMOCYSTEINE: CPT

## 2020-05-22 PROCEDURE — 83735 ASSAY OF MAGNESIUM: CPT

## 2020-05-22 PROCEDURE — 82746 ASSAY OF FOLIC ACID SERUM: CPT

## 2020-05-22 PROCEDURE — 84207 ASSAY OF VITAMIN B-6: CPT

## 2020-05-22 PROCEDURE — 36415 COLL VENOUS BLD VENIPUNCTURE: CPT

## 2020-05-22 PROCEDURE — 82607 VITAMIN B-12: CPT

## 2020-05-22 PROCEDURE — 82306 VITAMIN D 25 HYDROXY: CPT

## 2020-05-27 LAB — VIT B6 SERPL-MCNC: 161 NMOL/L (ref 20–125)

## 2020-06-01 ENCOUNTER — TELEPHONE (OUTPATIENT)
Dept: MEDICAL GROUP | Facility: PHYSICIAN GROUP | Age: 40
End: 2020-06-01

## 2020-06-01 NOTE — TELEPHONE ENCOUNTER
1. Caller Name: Mattie Hicks                          Call Back Number: [phone] (home)         How would the patient prefer to be contacted with a response: Phone call do NOT leave a detailed message    Pt states she was recently started on topiramate but she is having problems taking it.  She states she has tried taking half and tried different times of the day. She states she gets headaches when she takes it.  She is asking what she should do.

## 2020-07-08 DIAGNOSIS — F41.9 ANXIETY: ICD-10-CM

## 2020-07-08 RX ORDER — LORAZEPAM 1 MG/1
1 TABLET ORAL
Qty: 30 TAB | Refills: 0 | Status: SHIPPED | OUTPATIENT
Start: 2020-07-08 | End: 2020-09-21 | Stop reason: SDUPTHER

## 2020-07-31 ENCOUNTER — OFFICE VISIT (OUTPATIENT)
Dept: URGENT CARE | Facility: PHYSICIAN GROUP | Age: 40
End: 2020-07-31
Payer: COMMERCIAL

## 2020-07-31 ENCOUNTER — HOSPITAL ENCOUNTER (OUTPATIENT)
Facility: MEDICAL CENTER | Age: 40
End: 2020-07-31
Attending: PHYSICIAN ASSISTANT
Payer: COMMERCIAL

## 2020-07-31 VITALS
HEART RATE: 83 BPM | OXYGEN SATURATION: 96 % | HEIGHT: 68 IN | WEIGHT: 190 LBS | DIASTOLIC BLOOD PRESSURE: 72 MMHG | TEMPERATURE: 97.5 F | BODY MASS INDEX: 28.79 KG/M2 | RESPIRATION RATE: 18 BRPM | SYSTOLIC BLOOD PRESSURE: 110 MMHG

## 2020-07-31 DIAGNOSIS — Z20.822 SUSPECTED COVID-19 VIRUS INFECTION: ICD-10-CM

## 2020-07-31 LAB
COVID ORDER STATUS COVID19: NORMAL
FLUAV+FLUBV AG SPEC QL IA: NEGATIVE
INT CON NEG: NORMAL
INT CON NEG: NORMAL
INT CON POS: NORMAL
INT CON POS: NORMAL
S PYO AG THROAT QL: NEGATIVE

## 2020-07-31 PROCEDURE — 99213 OFFICE O/P EST LOW 20 MIN: CPT | Performed by: PHYSICIAN ASSISTANT

## 2020-07-31 PROCEDURE — U0003 INFECTIOUS AGENT DETECTION BY NUCLEIC ACID (DNA OR RNA); SEVERE ACUTE RESPIRATORY SYNDROME CORONAVIRUS 2 (SARS-COV-2) (CORONAVIRUS DISEASE [COVID-19]), AMPLIFIED PROBE TECHNIQUE, MAKING USE OF HIGH THROUGHPUT TECHNOLOGIES AS DESCRIBED BY CMS-2020-01-R: HCPCS

## 2020-07-31 PROCEDURE — 87880 STREP A ASSAY W/OPTIC: CPT | Performed by: PHYSICIAN ASSISTANT

## 2020-07-31 PROCEDURE — 87804 INFLUENZA ASSAY W/OPTIC: CPT | Performed by: PHYSICIAN ASSISTANT

## 2020-07-31 ASSESSMENT — ENCOUNTER SYMPTOMS
HEADACHES: 1
VOMITING: 0
EYES NEGATIVE: 1
ABDOMINAL PAIN: 0
CHILLS: 1
SHORTNESS OF BREATH: 0
DIARRHEA: 1
FEVER: 1
NAUSEA: 0
COUGH: 0
SORE THROAT: 1
MYALGIAS: 1
WHEEZING: 0
SPUTUM PRODUCTION: 0

## 2020-07-31 ASSESSMENT — FIBROSIS 4 INDEX: FIB4 SCORE: 0.68

## 2020-07-31 NOTE — PROGRESS NOTES
Subjective:   Mattie Hicks is a 40 y.o. female who presents for Pharyngitis (h/a, fatigue, chills, bodyaches x1 day )      Pharyngitis    Associated symptoms include congestion, diarrhea, ear pain and headaches. Pertinent negatives include no abdominal pain, coughing, shortness of breath or vomiting.   Onset of symptoms yesterday. Suddenly around noon.   HA intermittent. Mild currently.  Sore throat   Fatigue   Hot and cold sweats  Body aches.     Right ear pressure.   Nasal congestion   Diarrhea x 1 yesterday. No blood or black stools.   Denies any fever, vision changes, cough, chest pain, shortness breath, abdominal pain, nausea, vomiting, loss of sense of taste or smell.     No known exposure to COVID-19.       Review of Systems   Constitutional: Positive for chills, fever and malaise/fatigue.   HENT: Positive for congestion, ear pain and sore throat.    Eyes: Negative.    Respiratory: Negative for cough, sputum production, shortness of breath and wheezing.    Cardiovascular: Negative for chest pain.   Gastrointestinal: Positive for diarrhea. Negative for abdominal pain, nausea and vomiting.   Musculoskeletal: Positive for myalgias.   Neurological: Positive for headaches.       Medications:    • fluoxetine  • levonorgestrel  • LORazepam Tabs  • topiramate Tabs    Allergies: Prednisone    Problem List: Mattie Hicks has RUQ pain; Depression; and Anxiety on their problem list.    Surgical History:  Past Surgical History:   Procedure Laterality Date   • GYN SURGERY     • PRIMARY C SECTION         Past Social Hx: Mattie Hicks  reports that she has quit smoking. Her smoking use included cigarettes. She smoked 0.00 packs per day. She has never used smokeless tobacco. She reports current alcohol use. She reports that she does not use drugs.     Past Family Hx:  Mattie Hicks family history includes COPD in her father; Heart Failure in her maternal grandfather; No Known Problems in her mother.     Problem list,  "medications, and allergies reviewed by myself today in Epic.     Objective:     /72 (BP Location: Left arm, Patient Position: Sitting, BP Cuff Size: Adult)   Pulse 83   Temp 36.4 °C (97.5 °F) (Temporal)   Resp 18   Ht 1.727 m (5' 8\")   Wt 86.2 kg (190 lb)   SpO2 96%   BMI 28.89 kg/m²     Physical Exam  Vitals signs reviewed.   Constitutional:       General: She is not in acute distress.     Appearance: Normal appearance. She is not ill-appearing or toxic-appearing.   HENT:      Right Ear: Tympanic membrane normal.      Left Ear: Tympanic membrane normal.      Mouth/Throat:      Mouth: Mucous membranes are moist. No oral lesions.      Pharynx: Oropharynx is clear. Uvula midline. No pharyngeal swelling, oropharyngeal exudate, posterior oropharyngeal erythema or uvula swelling.      Tonsils: No tonsillar exudate.   Eyes:      Conjunctiva/sclera: Conjunctivae normal.      Pupils: Pupils are equal, round, and reactive to light.   Cardiovascular:      Rate and Rhythm: Normal rate.      Heart sounds: Normal heart sounds.   Pulmonary:      Effort: Pulmonary effort is normal. No respiratory distress.      Breath sounds: Normal breath sounds. No wheezing, rhonchi or rales.   Lymphadenopathy:      Cervical: No cervical adenopathy.   Skin:     General: Skin is warm and dry.   Neurological:      General: No focal deficit present.      Mental Status: She is alert.   Psychiatric:         Mood and Affect: Mood normal.         Behavior: Behavior normal.         Assessment/Plan:     Diagnosis and associated orders:     1. Suspected COVID-19 virus infection  POCT Rapid Strep A    POCT Influenza A/B    COVID/SARS COV-2 PCR      Comments/MDM:     Strep A Negative   Influenza A/B Negative   Discussed with patient signs and symptoms most likely are due to a viral etiology.   Test for COVID-19. We will call back for results and appropriate further instructions.   Symptomatic care.  Plenty of oral hydration and rest   Over the " counter cough suppressant as directed.  Tylenol or ibuprofen for pain and fever as directed.   Saline nasal spray or Mucinex as a decongestant.  Infection control measures at home. Stay away from people, Hand washing, covering sneeze/cough, disinfect surfaces.   Remain home from work, school, and other populated environments. Work note provided.   Discharge Instructions on COVID-19 and resources handed to patient.   Overall, the patient is well-appearing. Normal vital signs and benign examination. Normal lung examination. Suspicions for pneumonia are low. Therefore, antibiotics are not indicated at this time.        I confirmed the patient's mobile phone number, that their voicemail was set up, and received verbal consent to leave a voicemail if they do not answer the call.  The patient was amenable with this means of communication.    Red flags discussed and indications to immediately call 911 or present to the Emergency Department.   Supportive care, differential diagnoses, and indications for immediate follow-up discussed with patient.    Pathogenesis of diagnosis discussed including typical length and natural progression. Patient expresses understanding and agrees to plan.    Advised the patient to follow-up with the primary care physician for recheck, reevaluation, and consideration of further management.    Please note that this dictation was created using voice recognition software. I have made a reasonable attempt to correct obvious errors, but I expect that there are errors of grammar and possibly content that I did not discover before finalizing the note.    This note was electronically signed by Fernie Singh PA-C

## 2020-07-31 NOTE — PATIENT INSTRUCTIONS

## 2020-08-01 LAB
SARS-COV-2 RNA RESP QL NAA+PROBE: NOTDETECTED
SPECIMEN SOURCE: NORMAL

## 2020-08-03 ENCOUNTER — TELEPHONE (OUTPATIENT)
Dept: URGENT CARE | Facility: PHYSICIAN GROUP | Age: 40
End: 2020-08-03

## 2020-08-03 NOTE — TELEPHONE ENCOUNTER
Attempted to call patient regarding COVID-19 result.    Left voicemail regarding negative COVID-19 result     Instructed to call the urgent care if any questions.  Will release results to [employer] as well.

## 2020-08-14 RX ORDER — FLUOXETINE HYDROCHLORIDE 40 MG/1
40 CAPSULE ORAL
Qty: 90 CAP | Refills: 0 | Status: SHIPPED | OUTPATIENT
Start: 2020-08-14 | End: 2020-12-16 | Stop reason: SDUPTHER

## 2020-08-25 ENCOUNTER — HOSPITAL ENCOUNTER (OUTPATIENT)
Dept: RADIOLOGY | Facility: MEDICAL CENTER | Age: 40
End: 2020-08-25
Attending: FAMILY MEDICINE
Payer: COMMERCIAL

## 2020-08-25 DIAGNOSIS — N64.4 BREAST PAIN: ICD-10-CM

## 2020-08-25 PROCEDURE — 76642 ULTRASOUND BREAST LIMITED: CPT | Mod: RT

## 2020-08-25 PROCEDURE — G0279 TOMOSYNTHESIS, MAMMO: HCPCS

## 2020-09-21 DIAGNOSIS — F41.9 ANXIETY: ICD-10-CM

## 2020-09-21 NOTE — TELEPHONE ENCOUNTER
Received request via: Patient    Was the patient seen in the last year in this department?yes    Does the patient have an active prescription (recently filled or refills available) for medication(s) requested? No

## 2020-09-22 RX ORDER — LORAZEPAM 1 MG/1
1 TABLET ORAL
Qty: 30 TAB | Refills: 0 | Status: SHIPPED | OUTPATIENT
Start: 2020-09-22 | End: 2020-10-22

## 2020-11-01 ENCOUNTER — HOSPITAL ENCOUNTER (EMERGENCY)
Facility: MEDICAL CENTER | Age: 40
End: 2020-11-02
Attending: EMERGENCY MEDICINE
Payer: COMMERCIAL

## 2020-11-01 DIAGNOSIS — F10.929 ALCOHOLIC INTOXICATION WITH COMPLICATION (HCC): ICD-10-CM

## 2020-11-01 DIAGNOSIS — R45.851 SUICIDAL IDEATION: ICD-10-CM

## 2020-11-01 LAB
AMPHET UR QL SCN: NEGATIVE
BARBITURATES UR QL SCN: NEGATIVE
BENZODIAZ UR QL SCN: NEGATIVE
BZE UR QL SCN: NEGATIVE
CANNABINOIDS UR QL SCN: NEGATIVE
METHADONE UR QL SCN: NEGATIVE
OPIATES UR QL SCN: NEGATIVE
OXYCODONE UR QL SCN: NEGATIVE
PCP UR QL SCN: NEGATIVE
POC BREATHALIZER: 0.15 PERCENT (ref 0–0.01)
POC BREATHALIZER: 0.18 PERCENT (ref 0–0.01)
PROPOXYPH UR QL SCN: NEGATIVE

## 2020-11-01 PROCEDURE — 302970 POC BREATHALIZER: Performed by: EMERGENCY MEDICINE

## 2020-11-01 PROCEDURE — 99285 EMERGENCY DEPT VISIT HI MDM: CPT

## 2020-11-01 PROCEDURE — 80307 DRUG TEST PRSMV CHEM ANLYZR: CPT

## 2020-11-01 RX ORDER — LORAZEPAM 1 MG/1
0.5 TABLET ORAL EVERY 4 HOURS PRN
COMMUNITY
End: 2020-12-16 | Stop reason: SDUPTHER

## 2020-11-01 ASSESSMENT — LIFESTYLE VARIABLES
HAVE YOU EVER FELT YOU SHOULD CUT DOWN ON YOUR DRINKING: YES
EVER FELT BAD OR GUILTY ABOUT YOUR DRINKING: YES
ON A TYPICAL DAY WHEN YOU DRINK ALCOHOL HOW MANY DRINKS DO YOU HAVE: 5
AVERAGE NUMBER OF DAYS PER WEEK YOU HAVE A DRINK CONTAINING ALCOHOL: 5
TOTAL SCORE: 4
EVER HAD A DRINK FIRST THING IN THE MORNING TO STEADY YOUR NERVES TO GET RID OF A HANGOVER: YES
TOTAL SCORE: 4
CONSUMPTION TOTAL: POSITIVE
DO YOU DRINK ALCOHOL: YES
HAVE PEOPLE ANNOYED YOU BY CRITICIZING YOUR DRINKING: YES
HOW MANY TIMES IN THE PAST YEAR HAVE YOU HAD 5 OR MORE DRINKS IN A DAY: 10
TOTAL SCORE: 4
DOES PATIENT WANT TO TALK TO SOMEONE ABOUT QUITTING: YES

## 2020-11-01 ASSESSMENT — FIBROSIS 4 INDEX: FIB4 SCORE: 0.68

## 2020-11-02 VITALS
BODY MASS INDEX: 30.37 KG/M2 | HEIGHT: 68 IN | RESPIRATION RATE: 16 BRPM | HEART RATE: 72 BPM | TEMPERATURE: 97.3 F | WEIGHT: 200.4 LBS | OXYGEN SATURATION: 92 % | SYSTOLIC BLOOD PRESSURE: 110 MMHG | DIASTOLIC BLOOD PRESSURE: 78 MMHG

## 2020-11-02 LAB — POC BREATHALIZER: 0.07 PERCENT (ref 0–0.01)

## 2020-11-02 PROCEDURE — 90791 PSYCH DIAGNOSTIC EVALUATION: CPT

## 2020-11-02 PROCEDURE — 302970 POC BREATHALIZER

## 2020-11-02 PROCEDURE — 700102 HCHG RX REV CODE 250 W/ 637 OVERRIDE(OP): Performed by: EMERGENCY MEDICINE

## 2020-11-02 PROCEDURE — A9270 NON-COVERED ITEM OR SERVICE: HCPCS | Performed by: EMERGENCY MEDICINE

## 2020-11-02 RX ORDER — LORAZEPAM 1 MG/1
1 TABLET ORAL ONCE
Status: COMPLETED | OUTPATIENT
Start: 2020-11-02 | End: 2020-11-02

## 2020-11-02 RX ADMIN — LORAZEPAM 1 MG: 1 TABLET ORAL at 00:41
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
## 2020-11-02 NOTE — DISCHARGE PLANNING
"    [facility]  Crisis/Safety Plan    Name:  Mattie Hicks  MRN:  [id number]  Date:  2020    Warning signs that a crisis may be developing for me or I may be at risk:  1) panic attacks  2) anxiety  3) pacing    Coping strategies I can use on my own (relaxation, physical activity, etc):  1) exercise  2) play with animals  3) music    Ways I can make my environment safe:  1) secure medications  2) secure sharps  3) no firearms in the home    Things I want to tell myself when I feel a crisis developin) \"It will all work out.\"  2) \"Tomorrow is another day.\"  3) \"You're not alone.\"    People I can contact for support or distraction (and their phone numbers):  1) My  [phone]   2) My mother [phone]  3) My sister [phone]    If I’m not able to reach my support people, or the above strategies don’t help, I can contact the following professionals, agencies, or hotlines:  1) Crisis Call Center ():  [phone] -OR- [phone]  2) Crisis Text Line ():  Text CARE TO 309955  3)   4)     Didi Kenney R.N.    "

## 2020-11-02 NOTE — ED NOTES
Med rec updated and complete. Allergies reviewed .  No antibiotic use in last 14 days.    Home pharmacy  [facility]

## 2020-11-02 NOTE — ED NOTES
Sitter at bedside. Pt was provided water. Pt was offered food but she declined. Will continue to monitor.

## 2020-11-02 NOTE — ED NOTES
"Pt discharged home with  and resources for outpatient detox and psychological services. Pt in possession of belongings. Pt provided discharge education and information pertaining to medications and follow up appointments. Pt received copy of discharge instructions and verbalized understanding. /78   Pulse 72   Temp 36.3 °C (97.3 °F) (Temporal)   Resp 16   Ht 1.727 m (5' 8\")   Wt 90.9 kg (200 lb 6.4 oz)   SpO2 92%   BMI 30.47 kg/m²     "

## 2020-11-02 NOTE — ED PROVIDER NOTES
ED PROVIDER NOTE    Scribed for Brit Wong M.D. by Melquiades Burgess. 11/2/2020, 12:06 AM.    This is an addendum to the note on Mattie Hicks. For further details and full chart entry, see the previously signed ED Provider Note written by Dr. Mesa (ERP).      12:06 AM - I discussed the patient's case with Dr. Mesa (ERP) who will transfer care of the patient to me at this time. Will reevaluate once patient is sober.   Patient came in initially complaining of suicidal ideation when intoxicated.  When she is clinically sober she was no longer complaining of suicidal ideation.  She was reassessed by behavioral health who agrees that patient is no longer a danger to herself and is contracted for safety.  Therefore she will be discharged in stable condition.    1:30 AM - Discussed discharge instructions and return precautions with the patient and they were cleared for discharge. Patient was given the opportunity to ask any further questions. She is comfortable with discharge at this time.       The patient will return for new or worsening symptoms and is stable at the time of discharge.    The patient is referred to a primary physician for blood pressure management, diabetic screening, and for all other preventative health concerns.    DISPOSITION:  Patient will be discharged home in stable condition.    FOLLOW UP:  Mahsa La M.D.  [address] [place] [id number] [phone]          OUTPATIENT MEDICATIONS:  New Prescriptions    No medications on file       FINAL IMPRESSION   1. Suicidal ideation    2. Alcoholic intoxication with complication (HCC)         Melquiades MACKEY (Amelia), am scribing for, and in the presence of, Brit Wong M.D..    Electronically signed by: Melquiades Burgess (Chaitanyaibe), 11/2/2020    Brit MACKEY M.D. personally performed the services described in this documentation, as scribed by Melquiades Burgess in my presence, and it is both accurate and complete.    The  note accurately reflects work and decisions made by me.  Brit Wong M.D.  11/2/2020  7:18 AM

## 2020-11-02 NOTE — ED TRIAGE NOTES
"Mattie Hicks  40 y.o.  Chief Complaint   Patient presents with   • Suicidal Ideation     no plan   • ETOH Intoxication     drinks 1 bottle vodka daily, last drink 1400 today, states \"I need a drink first thing in the morning to get rid of the shakes\"     Ambulatory to triage with steady gait for above. A & O x 4, GCS 15. Cooperative, conversing appropriately with staff. Tearful in triage.    No active plan or suicide attempt at present. Endorses feelings of SI x 3 weeks. States that 3 weeks ago she attempted to superficially cut herself with a knife.    [place] Suicide Screening Score: HIGH    Charge SUDHA Bragg notified.    Patient roomed directly from triage to [place] [id number].  "

## 2020-11-02 NOTE — ED PROVIDER NOTES
"ED Provider Note    Scribed for Jason Mesa M.D. by Harinder Lloyd. 11/1/2020, 5:30 PM.    Primary care provider: Masha La M.D.  Means of arrival: Walk In  History obtained from: Patient  History limited by: None    CHIEF COMPLAINT  Chief Complaint   Patient presents with   • Suicidal Ideation     no plan   • ETOH Intoxication     drinks 1 bottle vodka daily, last drink 1400 today, states \"I need a drink first thing in the morning to get rid of the shakes\"       HPI  Mattie Hicks is a 40 y.o. female who presents to the Emergency Department for evaluation of alcohol intoxication and suicidal ideations. Patient states that for the last three weeks she has been having suicidal ideations, and when they first onset the patient attempted to cut her self superficially with a knife, however since then has not made any attempts. She also endorses frequent alcohol use, stating that she drinks a bottle of vodka per day (size unspecified), and needs to drink first thing in the morning as she starts to shake and withdraw from alcohol. Her family has brought her here for evaluation as they are concerned for her wellbeing. Patient denies having any homicidal ideations, hallucinations, fevers, chills, chest pain, cough or shortness of breath.    REVIEW OF SYSTEMS  Pertinent positives include suicidal ideations, alcohol abuse. Pertinent negatives include no homicidal ideations, hallucinations, fevers, chills, chest pain, cough, shortness of breath.  All other systems reviewed and negative.    PAST MEDICAL HISTORY   has a past medical history of Alcohol abuse, Back pain, Depression, Indigestion, and Seizure (HCC).    SURGICAL HISTORY   has a past surgical history that includes primary c section and gyn surgery.    SOCIAL HISTORY  Social History     Tobacco Use   • Smoking status: Former Smoker     Packs/day: 0.00     Types: Cigarettes   • Smokeless tobacco: Never Used   Substance Use Topics   • Alcohol use: Yes     Frequency: " "2-4 times a month     Drinks per session: 3 or 4     Binge frequency: Never     Comment: daily   • Drug use: No      Social History     Substance and Sexual Activity   Drug Use No       FAMILY HISTORY  Family History   Problem Relation Age of Onset   • No Known Problems Mother    • COPD Father    • Heart Failure Maternal Grandfather        CURRENT MEDICATIONS  Home Medications     Reviewed by Renae Yañez R.N. (Registered Nurse) on 11/01/20 at 1725  Med List Status: Partial   Medication Last Dose Status   fluoxetine (PROZAC) 40 MG capsule  Active   levonorgestrel (MIRENA, 52 MG,) 20 MCG/24HR IUD  Active   LORazepam (ATIVAN) 1 MG Tab  Active                ALLERGIES  Allergies   Allergen Reactions   • Prednisone Anxiety       PHYSICAL EXAM  VITAL SIGNS: /95   Pulse 95   Temp 36.3 °C (97.3 °F) (Temporal)   Resp 16   Ht 1.727 m (5' 8\")   Wt 90.9 kg (200 lb 6.4 oz)   SpO2 97%   BMI 30.47 kg/m²     Constitutional: Well developed, Well nourished, No acute distress, Non-toxic appearance.   HENT: Normocephalic, Atraumatic, TMs normal, mucous membranes moist, no erythema, exudates, swelling, or masses, nares patent  Eyes: nonicteric  Neck: Supple, no meningismus  Lymphatic: No lymphadenopathy noted.   Cardiovascular: Regular rate and rhythm, no gallops rubs or murmurs  Lungs: Clear bilaterally   Abdomen: Bowel sounds normal, Soft, No tenderness  Skin: Warm, Dry, no rash  Back: No tenderness, No CVA tenderness.   Genitalia: Deferred  Rectal: Deferred  Extremities: No edema  Neurologic: Alert, appropriate, follows commands, moving all extremities, normal speech   Psychiatric: Affect normal    DIAGNOSTIC STUDIES / PROCEDURES    LABS  Results for orders placed or performed during the hospital encounter of 11/01/20   Urine Drug Screen   Result Value Ref Range    Amphetamines Urine Negative Negative    Barbiturates Negative Negative    Benzodiazepines Negative Negative    Cocaine Metabolite Negative Negative    " Methadone Negative Negative    Opiates Negative Negative    Oxycodone Negative Negative    Phencyclidine -Pcp Negative Negative    Propoxyphene Negative Negative    Cannabinoid Metab Negative Negative   POC BREATHALIZER   Result Value Ref Range    POC Breathalizer 0.181 (A) 0.00 - 0.01 Percent   POC BREATHALIZER   Result Value Ref Range    POC Breathalizer 0.155 (A) 0.00 - 0.01 Percent     All labs reviewed by me.      COURSE & MEDICAL DECISION MAKING  Nursing notes, VS, PMSFHx reviewed in chart.     5:30 PM Patient seen and examined at bedside. Ordered for Urine drug screen, POC Breathalyzer to evaluate. Discussed with the patient that I am going to have the alert team come evaluate her to help further determine a plan of care, and will discuss with them afterwards. She understands and agrees.    Decision Making:  This is a 40 y.o. year old female who presents with alcohol intoxication and suicidal ideation with no plan.  The patient is metabolizing her alcohol and is currently pending life skills evaluation.  Pending medical clearance after clearance of alcohol the patient will be evaluated.  We will continue to monitor.    FINAL IMPRESSION  1. Suicidal ideation    2. Alcoholic intoxication with complication (HCC)          Harinder MACKEY (Scribe), am scribing for, and in the presence of, Jason Mesa M.D..    Electronically signed by: Harinder Lloyd (Chaitanyaibe), 11/1/2020    Jason MACKEY M.D. personally performed the services described in this documentation, as scribed by Harinder Lloyd in my presence, and it is both accurate and complete. C.    The note accurately reflects work and decisions made by me.  Jason Mesa M.D.  11/1/2020  10:47 PM

## 2020-12-16 ENCOUNTER — TELEMEDICINE (OUTPATIENT)
Dept: MEDICAL GROUP | Facility: PHYSICIAN GROUP | Age: 40
End: 2020-12-16
Payer: COMMERCIAL

## 2020-12-16 VITALS — WEIGHT: 190 LBS | BODY MASS INDEX: 28.79 KG/M2 | HEIGHT: 68 IN

## 2020-12-16 DIAGNOSIS — F33.0 MILD EPISODE OF RECURRENT MAJOR DEPRESSIVE DISORDER (HCC): ICD-10-CM

## 2020-12-16 DIAGNOSIS — F41.9 ANXIETY: ICD-10-CM

## 2020-12-16 DIAGNOSIS — M25.561 ACUTE PAIN OF RIGHT KNEE: ICD-10-CM

## 2020-12-16 PROCEDURE — 99214 OFFICE O/P EST MOD 30 MIN: CPT | Mod: 95,CR | Performed by: FAMILY MEDICINE

## 2020-12-16 RX ORDER — FLUOXETINE HYDROCHLORIDE 40 MG/1
40 CAPSULE ORAL
Qty: 90 CAP | Refills: 3 | Status: SHIPPED | OUTPATIENT
Start: 2020-12-16 | End: 2021-07-06

## 2020-12-16 RX ORDER — LORAZEPAM 1 MG/1
.5-1 TABLET ORAL EVERY 4 HOURS PRN
Qty: 90 TAB | Refills: 0 | Status: SHIPPED | OUTPATIENT
Start: 2020-12-16 | End: 2021-03-16

## 2020-12-16 SDOH — HEALTH STABILITY: MENTAL HEALTH: HOW MANY STANDARD DRINKS CONTAINING ALCOHOL DO YOU HAVE ON A TYPICAL DAY?: 7 TO 9

## 2020-12-16 SDOH — HEALTH STABILITY: MENTAL HEALTH: HOW OFTEN DO YOU HAVE A DRINK CONTAINING ALCOHOL?: 2-3 TIMES A WEEK

## 2020-12-16 SDOH — HEALTH STABILITY: MENTAL HEALTH: HOW OFTEN DO YOU HAVE 6 OR MORE DRINKS ON ONE OCCASION?: WEEKLY

## 2020-12-16 ASSESSMENT — PATIENT HEALTH QUESTIONNAIRE - PHQ9
SUM OF ALL RESPONSES TO PHQ9 QUESTIONS 1 AND 2: 0
9. THOUGHTS THAT YOU WOULD BE BETTER OFF DEAD, OR OF HURTING YOURSELF: NOT AT ALL
7. TROUBLE CONCENTRATING ON THINGS, SUCH AS READING THE NEWSPAPER OR WATCHING TELEVISION: NEARLY EVERY DAY
SUM OF ALL RESPONSES TO PHQ QUESTIONS 1-9: 9
2. FEELING DOWN, DEPRESSED, IRRITABLE, OR HOPELESS: NOT AT ALL
4. FEELING TIRED OR HAVING LITTLE ENERGY: SEVERAL DAYS
3. TROUBLE FALLING OR STAYING ASLEEP OR SLEEPING TOO MUCH: NEARLY EVERY DAY
1. LITTLE INTEREST OR PLEASURE IN DOING THINGS: NOT AT ALL
8. MOVING OR SPEAKING SO SLOWLY THAT OTHER PEOPLE COULD HAVE NOTICED. OR THE OPPOSITE, BEING SO FIGETY OR RESTLESS THAT YOU HAVE BEEN MOVING AROUND A LOT MORE THAN USUAL: NOT AT ALL
6. FEELING BAD ABOUT YOURSELF - OR THAT YOU ARE A FAILURE OR HAVE LET YOURSELF OR YOUR FAMILY DOWN: MORE THAN HALF THE DAYS
5. POOR APPETITE OR OVEREATING: NOT AT ALL

## 2020-12-16 ASSESSMENT — FIBROSIS 4 INDEX: FIB4 SCORE: 0.68

## 2020-12-16 NOTE — PROGRESS NOTES
Chief Complaint   Patient presents with   • Depression     fv med   • Knee Pain     rt knee        HISTORY OF PRESENT ILLNESS: Patient is a 40 y.o. female established patient here today for the following concerns:    This encounter was conducted via Zoom .   Verbal consent was obtained. Patient's identity was verified.      1. Mild episode of recurrent major depressive disorder (HCC)  2. Anxiety  Here today for follow up on medications.  She reports that she has been doing ok with the fluoxetine and ativan for depression and anxiety.  She reports requiring 1/2-1 tab a couple times per week.  She would like to ensure she has enough to establish a new provider in my departure.  No SI/HI.  No adverse effects from the medication noted.     3. Acute pain of right knee  In addition, reports about 2 weeks of right lateral knee pain that is described as sharp stabbing lasting only a few minutes a time with applying pressure to the area.  No redness or swelling of the knee.  No trauma known.  No new exercise plans.        Past Medical, Social, and Family history reviewed and updated in EPIC    Allergies:Prednisone    Current Outpatient Medications   Medication Sig Dispense Refill   • fluoxetine (PROZAC) 40 MG capsule Take 1 Cap by mouth every day. 90 Cap 3   • LORazepam (ATIVAN) 1 MG Tab Take 0.5-1 Tabs by mouth every four hours as needed for Anxiety for up to 90 days. 90 Tab 0   • levonorgestrel (MIRENA, 52 MG,) 20 MCG/24HR IUD 1 Each by Intrauterine route Continuous.       No current facility-administered medications for this visit.          ROS:  Review of Systems   Constitutional: Negative for fever, chills, weight loss and malaise/fatigue.   HENT: Negative for ear pain, nosebleeds, congestion, sore throat and neck pain.    Eyes: Negative for blurred vision.   Respiratory: Negative for cough, sputum production, shortness of breath and wheezing.    Cardiovascular: Negative for chest pain, palpitations,  and leg swelling.  "  Gastrointestinal: Negative for heartburn, nausea, vomiting, diarrhea and abdominal pain.   Genitourinary: Negative for dysuria, urgency and frequency.   Musculoskeletal: Negative for myalgias, back pain and joint pain.   Skin: Negative for rash and itching.   Neurological: Negative for dizziness, tingling, tremors, sensory change, focal weakness and headaches.   Endo/Heme/Allergies: Does not bruise/bleed easily.   Psychiatric/Behavioral: Negative for depression, anxiety, suicidal ideas, insomnia and memory loss.      Exam:  Ht 1.727 m (5' 8\")   Wt 86.2 kg (190 lb)     General:  Well nourished, well developed in NAD  Head is grossly normal.  Neck: Supple without JVD, Trachea midline, no thyromegaly noted  Pulmonary:  Normal effort. Speaking in full sentences  Psych: affect appropriate  Skin: no Jaundice noted or facial rashes    Please note that this dictation was created using voice recognition software. I have made every reasonable attempt to correct obvious errors, but I expect that there are errors of grammar and possibly content that I did not discover before finalizing the note.    Assessment/Plan:  1. Mild episode of recurrent major depressive disorder (HCC)  - fluoxetine (PROZAC) 40 MG capsule; Take 1 Cap by mouth every day.  Dispense: 90 Cap; Refill: 3    2. Anxiety  - LORazepam (ATIVAN) 1 MG Tab; Take 0.5-1 Tabs by mouth every four hours as needed for Anxiety for up to 90 days.  Dispense: 90 Tab; Refill: 0    3. Acute pain of right knee  Will obtain images to assess the joint pain for any lateral compartment degenerative changes. Suspect IT band syndrome.  Discussed rolling with foam roller.  If not improving may consider PT.    - DX-KNEE 3 VIEWS RIGHT; Future    Follow up with new provider in the new year.          "

## 2020-12-22 ENCOUNTER — OFFICE VISIT (OUTPATIENT)
Dept: URGENT CARE | Facility: PHYSICIAN GROUP | Age: 40
End: 2020-12-22
Payer: COMMERCIAL

## 2020-12-22 VITALS
TEMPERATURE: 97.5 F | WEIGHT: 201 LBS | SYSTOLIC BLOOD PRESSURE: 110 MMHG | HEART RATE: 69 BPM | OXYGEN SATURATION: 96 % | DIASTOLIC BLOOD PRESSURE: 80 MMHG | RESPIRATION RATE: 16 BRPM | BODY MASS INDEX: 30.46 KG/M2 | HEIGHT: 68 IN

## 2020-12-22 DIAGNOSIS — M43.6 ACUTE TORTICOLLIS: ICD-10-CM

## 2020-12-22 DIAGNOSIS — M79.18 MYOFASCIAL PAIN: ICD-10-CM

## 2020-12-22 PROCEDURE — 99214 OFFICE O/P EST MOD 30 MIN: CPT | Performed by: NURSE PRACTITIONER

## 2020-12-22 RX ORDER — CYCLOBENZAPRINE HCL 5 MG
5-10 TABLET ORAL 3 TIMES DAILY PRN
Qty: 10 TAB | Refills: 0 | Status: SHIPPED | OUTPATIENT
Start: 2020-12-22 | End: 2021-07-06

## 2020-12-22 ASSESSMENT — FIBROSIS 4 INDEX: FIB4 SCORE: 0.68

## 2020-12-22 ASSESSMENT — ENCOUNTER SYMPTOMS
NECK PAIN: 1
FOCAL WEAKNESS: 0
DIZZINESS: 0
MYALGIAS: 0
TINGLING: 0
COUGH: 0
BACK PAIN: 0
FALLS: 0
FEVER: 0
HEADACHES: 0
NAUSEA: 0
SENSORY CHANGE: 0

## 2020-12-22 ASSESSMENT — LIFESTYLE VARIABLES: SUBSTANCE_ABUSE: 0

## 2020-12-22 NOTE — PATIENT INSTRUCTIONS
Acute Torticollis, Adult  Torticollis is a condition in which the muscles of the neck tighten (contract) abnormally, causing the neck to twist and the head to move into an unnatural position. Torticollis that develops suddenly is called acute torticollis. People with acute torticollis may have trouble turning their head. The condition can be painful and may range from mild to severe.  What are the causes?  This condition may be caused by:  · Sleeping in an awkward position (common).  · Extending or twisting the neck muscles beyond their normal position.  · An injury to the neck muscles.  · An infection.  · A tumor.  · Certain medicines.  · Long-lasting spasms of the neck muscles.  In some cases, the cause may not be known.  What increases the risk?  You are more likely to develop this condition if:  · You have a condition associated with loose ligaments, such as Down syndrome.  · You have a brain condition that affects vision, such as strabismus.  What are the signs or symptoms?  The main symptom of this condition is tilting of the head to one side. Other symptoms include:  · Pain in the neck.  · Trouble turning the head from side to side or up and down.  How is this diagnosed?  This condition may be diagnosed based on:  · A physical exam.  · Your medical history.  · Imaging tests, such as:  ? An X-ray.  ? An ultrasound.  ? A CT scan.  ? An MRI.  How is this treated?  Treatment for this condition depends on what is causing the condition. Mild cases may go away without treatment. Treatment for more serious cases may include:  · Medicines or shots to relax the muscles.  · Other medicines, such as antibiotics to treat the underlying cause.  · Wearing a soft neck collar.  · Physical therapy and stretching to improve neck strength and flexibility.  · Neck massage.  In severe cases, surgery may be needed to repair dislocated or broken bones or to treat nerves in the neck.  Follow these instructions at home:    · Take  over-the-counter and prescription medicines only as told by your health care provider.  · Do stretching exercises and massage your neck as told by your health care provider.  · If directed, apply heat to the affected area as often as told by your health care provider. Use the heat source that your health care provider recommends, such as a moist heat pack or a heating pad.  ? Place a towel between your skin and the heat source.  ? Leave the heat on for 20-30 minutes.  ? Remove the heat if your skin turns bright red. This is especially important if you are unable to feel pain, heat, or cold. You may have a greater risk of getting burned.  · If you wake up with torticollis after sleeping, check your bed or sleeping area. Look for lumpy pillows or unusual objects. Make sure your bed and sleeping area are comfortable.  · Keep all follow-up visits as told by your health care provider. This is important.  Contact a health care provider if:  · You have a fever.  · Your symptoms do not improve or they get worse.  Get help right away if:  · You have trouble breathing.  · You develop noisy breathing (stridor).  · You start to drool.  · You have trouble swallowing or pain when swallowing.  · You develop numbness or weakness in your hands or feet.  · You have changes in your speech, understanding, or vision.  · You are in severe pain.  · You cannot move your head or neck.  Summary  · Torticollis is a condition in which the muscles of the neck tighten (contract) abnormally, causing the neck to twist and the head to move into an unnatural position. Torticollis that develops suddenly is called acute torticollis.  · Treatment for this condition depends on what is causing the condition. Mild cases may go away without treatment.  · Do stretching exercises and massage your neck as told by your health care provider. You may also be instructed to apply heat to the area.  · Contact your health care provider if your symptoms do not  improve or they get worse.  This information is not intended to replace advice given to you by your health care provider. Make sure you discuss any questions you have with your health care provider.  Document Released: 12/15/2001 Document Revised: 11/30/2018 Document Reviewed: 02/15/2018  Elsevier Patient Education © 2020 [employer]

## 2020-12-22 NOTE — PROGRESS NOTES
Subjective:      Mattie Hicks is a 40 y.o. female who presents with Neck Pain (Pt sts she woke up this morning with pain on the left side of her neck with pain radiating to left shoudewr blade/shoulder. Onset 4 hours. )        Reviewed past medical, surgical and family history. Reviewed prescription and OTC medications with patient in electronic health record today  Allergies: Prednisone            HPI is a new problem.  Currently is a 40-year-old female presents with left-sided neck pain.  The pain started when she woke up this morning.  She has pain that radiates down her left shoulder and into her upper left back.  Hurts worse when she moves it.  Her neck feels stiff.  She took an Aleve prior to arrival.  She has not had any relief from it yet.  Pain is 7/10.  Pain is sharp and constant.  No other aggravating alleviating factors.    Review of Systems   Constitutional: Negative for fever.   Respiratory: Negative for cough.    Cardiovascular: Negative for chest pain.   Gastrointestinal: Negative for nausea.   Musculoskeletal: Positive for neck pain. Negative for back pain, falls, joint pain and myalgias.   Neurological: Negative for dizziness, tingling, sensory change, focal weakness and headaches.   Endo/Heme/Allergies: Negative for environmental allergies.   Psychiatric/Behavioral: Negative for substance abuse.     Allergies   Allergen Reactions   • Prednisone Anxiety     Past Medical History:   Diagnosis Date   • Alcohol abuse    • Back pain    • Depression    • Indigestion    • Seizure (HCC)      Past Surgical History:   Procedure Laterality Date   • GYN SURGERY     • PRIMARY C SECTION       Family History   Problem Relation Age of Onset   • No Known Problems Mother    • COPD Father    • Heart Failure Maternal Grandfather      Social History     Tobacco Use   • Smoking status: Former Smoker     Packs/day: 0.00     Types: Cigarettes     Quit date: 2015     Years since quittin.0   • Smokeless tobacco:  "Never Used   Substance Use Topics   • Alcohol use: Yes     Alcohol/week: 7.2 - 9.6 oz     Types: 6 - 8 Cans of beer, 6 - 8 Shots of liquor per week     Frequency: 2-3 times a week     Drinks per session: 7 to 9     Binge frequency: Weekly     Patient Active Problem List   Diagnosis   • RUQ pain   • Depression   • Anxiety     Current Outpatient Medications on File Prior to Visit   Medication Sig Dispense Refill   • fluoxetine (PROZAC) 40 MG capsule Take 1 Cap by mouth every day. 90 Cap 3   • LORazepam (ATIVAN) 1 MG Tab Take 0.5-1 Tabs by mouth every four hours as needed for Anxiety for up to 90 days. 90 Tab 0   • levonorgestrel (MIRENA, 52 MG,) 20 MCG/24HR IUD 1 Each by Intrauterine route Continuous.       No current facility-administered medications on file prior to visit.           Objective:     /80 (BP Location: Left arm, Patient Position: Sitting, BP Cuff Size: Large adult)   Pulse 69   Temp 36.4 °C (97.5 °F) (Temporal)   Resp 16   Ht 1.727 m (5' 8\")   Wt 91.2 kg (201 lb)   SpO2 96%   BMI 30.56 kg/m²      Physical Exam  Vitals signs and nursing note reviewed.   Constitutional:       Appearance: Normal appearance. She is normal weight.   HENT:      Right Ear: Hearing, tympanic membrane, ear canal and external ear normal.      Left Ear: Hearing, tympanic membrane, ear canal and external ear normal.   Neck:      Musculoskeletal: Normal range of motion and neck supple. Muscular tenderness present. No neck rigidity.      Trachea: Trachea and phonation normal.     Cardiovascular:      Rate and Rhythm: Normal rate and regular rhythm.      Pulses: Normal pulses.   Pulmonary:      Effort: Pulmonary effort is normal.      Breath sounds: Normal breath sounds.   Musculoskeletal: Normal range of motion.   Lymphadenopathy:      Cervical: No cervical adenopathy.   Skin:     General: Skin is warm.      Capillary Refill: Capillary refill takes less than 2 seconds.   Neurological:      General: No focal deficit " present.      Mental Status: She is alert and oriented to person, place, and time.      Motor: Motor function is intact.      Coordination: Coordination is intact.      Gait: Gait is intact.      Deep Tendon Reflexes:      Reflex Scores:       Brachioradialis reflexes are 2+ on the right side and 2+ on the left side.  Psychiatric:         Mood and Affect: Mood normal.         Behavior: Behavior normal.                 Assessment/Plan:        1. Acute torticollis    - cyclobenzaprine (FLEXERIL) 5 mg tablet; Take 1-2 Tabs by mouth 3 times a day as needed for Muscle Spasms.  Dispense: 10 Tab; Refill: 0     Gentle ROM exercises daily   Educated in proper administration of medication(s) ordered today including safety, possible SE, risks, benefits, rationale and alternatives to therapy. Educated in possible sedative effects of medication. Do not drive, drink ETOH or operate machinery while taking  OTC  analgesic of choice (acetaminophen or NSAID). Follow manufactures dosing and safety precautions.     Warm packs prn pain     Return to urgent care clinic or PCP  4-5  days if current symptoms are not resolving in a satisfactory manner or sooner if new or worsening symptoms occur.     Diagnosis, natural history, supportive care, and indications for immediate follow-up.     Verbalized agreement with this treatment plan and seemed to understand without barriers. Questions were encouraged and answered to satisfaction.

## 2021-04-20 ENCOUNTER — APPOINTMENT (OUTPATIENT)
Dept: RADIOLOGY | Facility: MEDICAL CENTER | Age: 41
End: 2021-04-20
Attending: EMERGENCY MEDICINE
Payer: COMMERCIAL

## 2021-04-20 ENCOUNTER — HOSPITAL ENCOUNTER (EMERGENCY)
Facility: MEDICAL CENTER | Age: 41
End: 2021-04-20
Attending: EMERGENCY MEDICINE
Payer: COMMERCIAL

## 2021-04-20 VITALS
DIASTOLIC BLOOD PRESSURE: 71 MMHG | TEMPERATURE: 97.8 F | SYSTOLIC BLOOD PRESSURE: 125 MMHG | HEIGHT: 68 IN | HEART RATE: 88 BPM | WEIGHT: 200 LBS | BODY MASS INDEX: 30.31 KG/M2 | OXYGEN SATURATION: 97 % | RESPIRATION RATE: 16 BRPM

## 2021-04-20 DIAGNOSIS — F10.10 ALCOHOL ABUSE: ICD-10-CM

## 2021-04-20 DIAGNOSIS — R45.851 SUICIDAL IDEATION: ICD-10-CM

## 2021-04-20 DIAGNOSIS — F10.920 ALCOHOLIC INTOXICATION WITHOUT COMPLICATION (HCC): ICD-10-CM

## 2021-04-20 LAB
ALBUMIN SERPL BCP-MCNC: 4.6 G/DL (ref 3.2–4.9)
ALBUMIN/GLOB SERPL: 1.3 G/DL
ALP SERPL-CCNC: 71 U/L (ref 30–99)
ALT SERPL-CCNC: 62 U/L (ref 2–50)
AMPHET UR QL SCN: NEGATIVE
ANION GAP SERPL CALC-SCNC: 14 MMOL/L (ref 7–16)
AST SERPL-CCNC: 62 U/L (ref 12–45)
BARBITURATES UR QL SCN: NEGATIVE
BASOPHILS # BLD AUTO: 0.4 % (ref 0–1.8)
BASOPHILS # BLD: 0.04 K/UL (ref 0–0.12)
BENZODIAZ UR QL SCN: NEGATIVE
BILIRUB SERPL-MCNC: 0.4 MG/DL (ref 0.1–1.5)
BUN SERPL-MCNC: 11 MG/DL (ref 8–22)
BZE UR QL SCN: NEGATIVE
CALCIUM SERPL-MCNC: 9.4 MG/DL (ref 8.5–10.5)
CANNABINOIDS UR QL SCN: NEGATIVE
CHLORIDE SERPL-SCNC: 99 MMOL/L (ref 96–112)
CO2 SERPL-SCNC: 19 MMOL/L (ref 20–33)
CREAT SERPL-MCNC: 0.62 MG/DL (ref 0.5–1.4)
EOSINOPHIL # BLD AUTO: 0.05 K/UL (ref 0–0.51)
EOSINOPHIL NFR BLD: 0.5 % (ref 0–6.9)
ERYTHROCYTE [DISTWIDTH] IN BLOOD BY AUTOMATED COUNT: 47.8 FL (ref 35.9–50)
GLOBULIN SER CALC-MCNC: 3.6 G/DL (ref 1.9–3.5)
GLUCOSE SERPL-MCNC: 109 MG/DL (ref 65–99)
HCG SERPL QL: NEGATIVE
HCT VFR BLD AUTO: 42.3 % (ref 37–47)
HGB BLD-MCNC: 14.4 G/DL (ref 12–16)
IMM GRANULOCYTES # BLD AUTO: 0.03 K/UL (ref 0–0.11)
IMM GRANULOCYTES NFR BLD AUTO: 0.3 % (ref 0–0.9)
LIPASE SERPL-CCNC: 27 U/L (ref 11–82)
LYMPHOCYTES # BLD AUTO: 4.24 K/UL (ref 1–4.8)
LYMPHOCYTES NFR BLD: 45.6 % (ref 22–41)
MCH RBC QN AUTO: 34.3 PG (ref 27–33)
MCHC RBC AUTO-ENTMCNC: 34 G/DL (ref 33.6–35)
MCV RBC AUTO: 100.7 FL (ref 81.4–97.8)
METHADONE UR QL SCN: NEGATIVE
MONOCYTES # BLD AUTO: 0.54 K/UL (ref 0–0.85)
MONOCYTES NFR BLD AUTO: 5.8 % (ref 0–13.4)
NEUTROPHILS # BLD AUTO: 4.4 K/UL (ref 2–7.15)
NEUTROPHILS NFR BLD: 47.4 % (ref 44–72)
NRBC # BLD AUTO: 0 K/UL
NRBC BLD-RTO: 0 /100 WBC
OPIATES UR QL SCN: NEGATIVE
OXYCODONE UR QL SCN: NEGATIVE
PCP UR QL SCN: NEGATIVE
PLATELET # BLD AUTO: 277 K/UL (ref 164–446)
PMV BLD AUTO: 9.9 FL (ref 9–12.9)
POC BREATHALIZER: 0.13 PERCENT (ref 0–0.01)
POC BREATHALIZER: 0.23 PERCENT (ref 0–0.01)
POTASSIUM SERPL-SCNC: 3.2 MMOL/L (ref 3.6–5.5)
PROPOXYPH UR QL SCN: NEGATIVE
PROT SERPL-MCNC: 8.2 G/DL (ref 6–8.2)
RBC # BLD AUTO: 4.2 M/UL (ref 4.2–5.4)
SODIUM SERPL-SCNC: 132 MMOL/L (ref 135–145)
WBC # BLD AUTO: 9.3 K/UL (ref 4.8–10.8)

## 2021-04-20 PROCEDURE — 85025 COMPLETE CBC W/AUTO DIFF WBC: CPT

## 2021-04-20 PROCEDURE — 80053 COMPREHEN METABOLIC PANEL: CPT

## 2021-04-20 PROCEDURE — 76705 ECHO EXAM OF ABDOMEN: CPT

## 2021-04-20 PROCEDURE — 99285 EMERGENCY DEPT VISIT HI MDM: CPT

## 2021-04-20 PROCEDURE — 80307 DRUG TEST PRSMV CHEM ANLYZR: CPT

## 2021-04-20 PROCEDURE — A9270 NON-COVERED ITEM OR SERVICE: HCPCS | Performed by: EMERGENCY MEDICINE

## 2021-04-20 PROCEDURE — 90791 PSYCH DIAGNOSTIC EVALUATION: CPT

## 2021-04-20 PROCEDURE — 700102 HCHG RX REV CODE 250 W/ 637 OVERRIDE(OP): Performed by: EMERGENCY MEDICINE

## 2021-04-20 PROCEDURE — 36415 COLL VENOUS BLD VENIPUNCTURE: CPT

## 2021-04-20 PROCEDURE — 83690 ASSAY OF LIPASE: CPT

## 2021-04-20 PROCEDURE — 302970 POC BREATHALIZER: Performed by: EMERGENCY MEDICINE

## 2021-04-20 PROCEDURE — 84703 CHORIONIC GONADOTROPIN ASSAY: CPT

## 2021-04-20 RX ORDER — LORAZEPAM 1 MG/1
0.5 TABLET ORAL ONCE
Status: COMPLETED | OUTPATIENT
Start: 2021-04-20 | End: 2021-04-20

## 2021-04-20 RX ORDER — LORAZEPAM 1 MG/1
1 TABLET ORAL ONCE
Status: COMPLETED | OUTPATIENT
Start: 2021-04-20 | End: 2021-04-20

## 2021-04-20 RX ADMIN — LORAZEPAM 0.5 MG: 1 TABLET ORAL at 16:01

## 2021-04-20 RX ADMIN — LORAZEPAM 1 MG: 1 TABLET ORAL at 22:17

## 2021-04-20 RX ADMIN — LORAZEPAM 1 MG: 1 TABLET ORAL at 20:42

## 2021-04-20 RX ADMIN — LORAZEPAM 1 MG: 1 TABLET ORAL at 17:06

## 2021-04-20 ASSESSMENT — LIFESTYLE VARIABLES: DO YOU DRINK ALCOHOL: YES

## 2021-04-20 ASSESSMENT — FIBROSIS 4 INDEX: FIB4 SCORE: 0.68

## 2021-04-20 NOTE — ED NOTES
Pt resting in [facility] without complaints.  Sitter outside room.  Will continue to monitor.

## 2021-04-20 NOTE — ED PROVIDER NOTES
"ED Provider Note    CHIEF COMPLAINT  Chief Complaint   Patient presents with   • Alcohol Intoxication   • Suicidal Ideation       HPI  Mattie Hicks is a 40 y.o. female with ETOH abuse who presents with her  for evaluation of SI with alcohol intoxication.    Patient states she is a heavy drinker.  She drinks daily.  She has never had an alcohol withdrawal seizure but does experience tremulousness if she does not drink.  Patient denies hallucinations and HI.  She denies current SI.    Patient reports diarrhea for 3 months and right upper quadrant pain that is mild.      ALLERGIES  Allergies   Allergen Reactions   • Prednisone Anxiety       CURRENT MEDICATIONS  denies    PAST MEDICAL HISTORY   has a past medical history of Alcohol abuse, Back pain, Depression, Indigestion, and Seizure (HCC).    SURGICAL HISTORY   has a past surgical history that includes primary c section and gyn surgery.    SOCIAL HISTORY  Social History     Tobacco Use   • Smoking status: Former Smoker     Packs/day: 0.00     Types: Cigarettes     Quit date: 2015     Years since quittin.3   • Smokeless tobacco: Never Used   Substance and Sexual Activity   • Alcohol use: Yes     Alcohol/week: 7.2 - 9.6 oz     Types: 6 - 8 Cans of beer, 6 - 8 Shots of liquor per week   • Drug use: No   • Sexual activity: Yes     Partners: Male         Family Hx:  addiction      REVIEW OF SYSTEMS  See HPI for further details.  All other systems are negative except as above in HPI.      PHYSICAL EXAM  VITAL SIGNS: /76   Pulse 80   Temp 36.6 °C (97.8 °F) (Temporal)   Resp 18   Ht 1.727 m (5' 8\")   Wt 90.7 kg (200 lb)   SpO2 98%   BMI 30.41 kg/m²     General:  WDWN female, nontoxic appearing in NAD; A+Ox3; V/S as above   Skin: warm and dry; good color; no rash  HEENT: NCAT; EOMs intact; PERRL; no scleral icterus   Neck: FROM; soft  Cardiovascular: Regular heart rate and rhythm.  No murmurs, rubs, or gallops  Lungs: Clear to " auscultation with good air movement bilaterally.  No wheezes, rhonchi, or rales.   Abdomen: BS present; soft; NTND; no rebound, guarding, or rigidity.  No organomegaly or pulsatile mass  Extremities: PASCUAL x 4; no e/o trauma; no pedal edema  Neurologic: CNs III-XII grossly intact; speech clear; distal sensation intact; strength 5/5 UE/LEs  Psychiatric: Appropriate affect, normal mood    LABS  Results for orders placed or performed during the hospital encounter of 04/20/21   URINE DRUG SCREEN   Result Value Ref Range    Amphetamines Urine Negative Negative    Barbiturates Negative Negative    Benzodiazepines Negative Negative    Cocaine Metabolite Negative Negative    Methadone Negative Negative    Opiates Negative Negative    Oxycodone Negative Negative    Phencyclidine -Pcp Negative Negative    Propoxyphene Negative Negative    Cannabinoid Metab Negative Negative   CBC WITH DIFFERENTIAL   Result Value Ref Range    WBC 9.3 4.8 - 10.8 K/uL    RBC 4.20 4.20 - 5.40 M/uL    Hemoglobin 14.4 12.0 - 16.0 g/dL    Hematocrit 42.3 37.0 - 47.0 %    .7 (H) 81.4 - 97.8 fL    MCH 34.3 (H) 27.0 - 33.0 pg    MCHC 34.0 33.6 - 35.0 g/dL    RDW 47.8 35.9 - 50.0 fL    Platelet Count 277 164 - 446 K/uL    MPV 9.9 9.0 - 12.9 fL    Neutrophils-Polys 47.40 44.00 - 72.00 %    Lymphocytes 45.60 (H) 22.00 - 41.00 %    Monocytes 5.80 0.00 - 13.40 %    Eosinophils 0.50 0.00 - 6.90 %    Basophils 0.40 0.00 - 1.80 %    Immature Granulocytes 0.30 0.00 - 0.90 %    Nucleated RBC 0.00 /100 WBC    Neutrophils (Absolute) 4.40 2.00 - 7.15 K/uL    Lymphs (Absolute) 4.24 1.00 - 4.80 K/uL    Monos (Absolute) 0.54 0.00 - 0.85 K/uL    Eos (Absolute) 0.05 0.00 - 0.51 K/uL    Baso (Absolute) 0.04 0.00 - 0.12 K/uL    Immature Granulocytes (abs) 0.03 0.00 - 0.11 K/uL    NRBC (Absolute) 0.00 K/uL   POC BREATHALIZER   Result Value Ref Range    POC Breathalizer 0.230 (A) 0.00 - 0.01 Percent   POC BREATHALIZER   Result Value Ref Range    POC Breathalizer 0.126  (A) 0.00 - 0.01 Percent       IMAGING  US-RUQ    (Results Pending)       MEDICAL RECORD  I have reviewed patient's medical record and pertinent results are listed below.      COURSE & MEDICAL DECISION MAKING  I have reviewed any medical record information, laboratory studies and radiographic results as noted.    Mattie Hicks is a 40 y.o. female who presents complaining of alcohol dependence and SI.  Patient's  states the patient states she is feeling suicidal when she is intoxicated.  Patient denies suicidality at this time.  No HI.  Patient reports tremulousness when she does not drink and requests assistance with detox.  She reports diarrhea for the last several months and right upper quadrant pain.  She is eating a hamburger and fries upon my entering the room.    Patient's initial breathalyzer revealed acute intoxication.  UDS is negative.    Appropriate PPE was worn at all times while interacting with the patient, including goggles, N95 mask, and surgical mask.    Labs and right upper quadrant ultrasound obtained.    6:05 PM  Patient be signed out to the oncoming ERP, Dr. Wong, who will follow up on remaining labs and life skills consultation.    FINAL IMPRESSION  1. Alcoholic intoxication without complication (HCC)     2. Alcohol abuse     3. Suicidal ideation         Electronically signed by: Gabriella Mata M.D., 4/20/2021 2:22 PM

## 2021-04-20 NOTE — PROGRESS NOTES
Spiritual Care Note    Patient Information     Patient's Name: Mattie Hicks   MRN: [id number]    YOB: 1980   Age and Gender: 40 y.o. female   Service Area: ED C   Room (and Bed): [address]   Ethnicity or Nationality:     Primary Language: English   Restorationism/Spiritual preference: Nothing in Particular   Place of Residence: [place]   Family/Friends/Others Present: Yes,    Clinical Team Present: No   Medical Diagnosis(-es)/Procedure(s): Alcohol Intoxication   Code Status: Prior    Date of Admission: 4/20/2021   Length of Stay: 0 days        Spiritual Care Provider Information:  Name of Spiritual Care Provider: Kendal Driver  Title of Spiritual Care Provider: Associate   Phone Number: [phone]  E-mail: [email or web address]  Total time : 10 minutes    Encounter/Request Information  Encounter/Request Type   Visited With: Patient and family together  Nature of the Visit: Initial, On shift  General Visit: Yes  Referral From/ Origin of Request: [place] management rounds, Verbal staff    Religous Needs/Values  Restorationism Needs Visit  Restorationism Needs: Prayer    Spiritual Assessment     Spiritual Care Encounters    Observations/Symptoms: Accepting, Thankfulness    Interaction/Conversation: SUDHA Sutherland referred the  to this pt, with  at the bedside, who requested prayer for healing and sobriety and thanked the . Pt adheres to no particular spiritual tradition, but was grateful for inclusive prayer.    Assessment: Need, Despair    Need: Seeking Spiritual Assistance and Support    Despair: Substance Abuse    Interventions: Compassionate presence, prayer.    Outcomes: Spiritual Comfort    Plan: Visit Upon Request    Notes:

## 2021-04-20 NOTE — PROGRESS NOTES
WENT TO DO US-RUQ EXAM AT 1645. PT WAS ACTIVELY EATING BURGER AND FRIES. SPOKE TO  ABOUT OUR NORMAL FASTING PREP FOR PATIENTS, SINCE THIS PT IS ON PSYCH HOLD AND NOT BEING DISCHARGED TODAY, SHE AGREED TO CHANGE THIS TO ROUTINE ORDER AND PROPERLY PREP THE PATIENT. RN WAS NOTIFIED OF THIS CHANGE, SIPS OF WATER WITH MEDS IS OK-OTHERWISE STRICT NPO. WE CAN DO THIS EXAM AROUND MIDNIGHT.

## 2021-04-21 NOTE — ED NOTES
Re-evaluation done. Patient discharged with instruction. Verbalized understanding. Mother will be the ride to [facility]. [facility] aware.

## 2021-04-21 NOTE — CONSULTS
[facility]   TRIAGE ASSESSMENT    Name: Mattie Hicks  MRN: [id number]  : 1980  Age: 40 y.o.  Date of assessment: 2021  PCP: Mahsa La M.D.  Persons in attendance: Patient    CHIEF COMPLAINT/PRESENTING ISSUE (as stated by Patient, ER RN, ERP):   Chief Complaint   Patient presents with   • Alcohol Intoxication   • Suicidal Ideation      Patient is a 39 y/o female BIB EMS intoxicated and endorsing suicidal ideation without a specific plan or intent. Patient is now clinically sober and adamantly denies current thoughts of suicide. Patient discloses her struggles with alcohol, often steadily drinking close to a liter bottle of vodka 4 times a week for the past 6 years. Patient is prescribed an antidepressant medication by her primary care provider but is not engaged in therapy. Patient admits to history of SI while intoxicated but denies any previous attempts although EMR documents pt superficially cutting her left wrist 6 months ago while under the influence without intent to kill herself. Patient is alert, engaged and oriented x 4; mood is depressed. Thought process is logical and goal-directed; pt is future oriented and forward thinking. Patient denies SI, HI or AVH and does not display paranoid or delusional thought content. Patient does not meet criteria for legal hold and able to contract for safety at this time. Patient would benefit from alcohol detoxification then bridging into rehab and/or intensive outpatient programs.      CURRENT LIVING SITUATION/SOCIAL SUPPORT: Patient lives with , 15 y/o son and 4 y/o son. 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 Patient reports good support system.     BEHAVIORAL HEALTH TREATMENT HISTORY  Does patient/parent report a history of prior behavioral health treatment for patient?   Prescribed Prozac 20 mg and Ativan 1 mg PRN by PCP.    SAFETY ASSESSMENT - SELF  Does patient acknowledge current or past symptoms of  "dangerousness to self? Yes; hx of SI and self-harm while intoxicated.   Does parent/significant other report patient has current or past symptoms of dangerousness to self? N\A  Does presenting problem suggest symptoms of dangerousness to self? No; now clinically sober and denies SI    SAFETY ASSESSMENT - OTHERS  Does patient acknowledge current or past symptoms of aggressive behavior or risk to others? no  Does parent/significant other report patient has current or past symptoms of aggressive behavior or risk to others?  N\A  Does presenting problem suggest symptoms of dangerousness to others? No; denies HI or aggression history.     Crisis Safety Plan completed and copy given to patient? Yes; completed verbal with the patient.     ABUSE/NEGLECT SCREENING  Does patient report feeling “unsafe” in his/her home, or afraid of anyone?  no  Does patient report any history of physical, sexual, or emotional abuse?  no  Does parent or significant other report any of the above? N\A  Is there evidence of neglect by self?  no  Is there evidence of neglect by a caregiver? no  Does the patient/parent report any history of CPS/APS/police involvement related to suspected abuse/neglect or domestic violence? no  Based on the information provided during the current assessment, is a mandated report of suspected abuse/neglect being made?  No    SUBSTANCE USE SCREENING  Yes:  Mateo all substances used in the past 30 days:      Last Use Amount   [x]   Alcohol 4/20/2021 1 liter of vodka   []   Marijuana     []   Heroin     []   Prescription Opioids  (used without prescription, for    recreation, or in excess of prescribed amount)     []   Other Prescription  (used without prescription, for    recreation, or in excess of prescribed amount)     []   Cocaine      []   Methamphetamine     []   \"\" drugs (ectasy, MDMA)     []   Other substances        UDS results: negative  Breathalyzer results: 0.230, 0.126, 0.00    What consequences " does the patient associate with any of the above substance use and or addictive behaviors? Health problems: alcohol addiction    Risk factors for detox (check all that apply):  []  Seizures   []  Diaphoretic (sweating)   [x]  Tremors   []  Hallucinations   []  Increased blood pressure   []  Decreased blood pressure   []  Other   []  None      [x] Patient education on risk factors for detoxification and instructed to return to ER as needed.      MENTAL STATUS   Participation: Active verbal participation, Engaged and Open to feedback  Grooming: Casual  Orientation: Alert and Fully Oriented  Behavior: Calm  Eye contact: Good  Mood: Depressed  Affect: Flexible and Full range  Thought process: Logical and Goal-directed  Thought content: Within normal limits  Speech: Rate within normal limits and Volume within normal limits  Perception: Within normal limits  Memory:  No gross evidence of memory deficits  Insight: Adequate  Judgment:  Adequate  Other:    Collateral information:   Source:  [] Significant other present in person:   [] Significant other by telephone  [] [facility]   [x] [facility] Nursing Staff  [x] [facility]  [x] Other: ERP    [] Unable to complete full assessment due to:  [] Acute intoxication  [] Patient declined to participate/engage  [] Patient verbally unresponsive  [] Significant cognitive deficits  [] Significant perceptual distortions or behavioral disorganization  [x] Other: N/A     CLINICAL IMPRESSIONS:  Primary:  Depression  Secondary: Alcohol Use       IDENTIFIED NEEDS/PLAN:  [Trigger DISPOSITION list for any items marked]    []  Imminent safety risk - self [] Imminent safety risk - others   []  Acute substance withdrawal []  Psychosis/Impaired reality testing   [x]  Mood/anxiety [x]  Substance use/Addictive behavior   []  Maladaptive behaviro []  Parent/child conflict   [x]  Family/Couples conflict []  Biomedical   []  Housing []  Financial   []   Legal  Occupational/Educational    []  Domestic violence []  Other:     Recommended Plan of Care:  Refer to [facility]; cab voucher (#[id number]) provided for patient to transport over to facility for further evaluation and admission.    Does patient express agreement with the above plan? yes    Referral appointment(s) scheduled? N\A    Alert team only: Patient is now clinically sober and adamantly denies SI. Patient requesting assistance to connect and continue alcohol detox. Once medically cleared patient to discharge over to [facility].   I have discussed findings and recommendations with Dr. Wong who is in agreement with these recommendations.     Referral information sent to the following community providers : [facility]      Didi Kenney R.N.  4/20/2021

## 2021-04-21 NOTE — ED PROVIDER NOTES
ED PROVIDER NOTE    Scribed for Brit Wong M.D. by Melquiades Burgess. 4/20/2021, 6:17 PM.    This is an addendum to the note on Mattie Hicks. For further details and full chart entry, see the previously signed ED Provider Note written by Dr. Mata ([facility]).      6:17 PM - I discussed the patient's case with Dr. Mata (ERP) who will transfer care of the patient to me at this time. Patient presents with intoxication and possible suicidal ideation. Awaiting sober evaluation by alert team and possible detox placement.       7:17 PM - Patient was reevaluated at bedside. Updated her on plan of care. She is showing no signs of active withdrawal.     8:40 PM - [facility] has evaluated patient and they report that she is not a danger to herself, patient actively denies suicidal ideation now that she is clinically sober. Awaiting medical clearance. Patient treated with Ativan.  Labs and imaging returned and are notable for hepatic steatosis likely related to her drinking, LFTs mildly elevated but bilirubin and alkaline phosphatase within normal limits.  No other acute findings noted.  She is medically clear for discharge to [facility] for assistance with her alcohol withdrawal.    10:14 PM - Spoke with nursing team. Patient will be treated with Ativan and transported to [facility].     The patient will return for new or worsening symptoms and is stable at the time of discharge.    The patient is referred to a primary physician for blood pressure management, diabetic screening, and for all other preventative health concerns.    DISPOSITION:  Patient will be discharged home in stable condition.    FOLLOW UP:  Mahsa La M.D.  [address] [facility] [address]  [phone]            FINAL IMPRESSION   1. Alcoholic intoxication without complication (HCC)    2. Alcohol abuse    3. Suicidal ideation       I, Melquiades Burgess (Scribe), am scribing for, and in the presence of, Brit  FLYNN Wong M.D..    Electronically signed by: Melquiades Burgess (Scribe), 4/20/2021    IBrit M.D. personally performed the services described in this documentation, as scribed by Melquiades Burgess in my presence, and it is both accurate and complete.    The note accurately reflects work and decisions made by me.  Brit Wong M.D.  4/21/2021  12:51 AM

## 2021-04-21 NOTE — ED NOTES
Pt resting in [facility].  No needs or complaints voiced at this time.   Sitter at bedside.

## 2021-07-06 ENCOUNTER — OFFICE VISIT (OUTPATIENT)
Dept: URGENT CARE | Facility: PHYSICIAN GROUP | Age: 41
End: 2021-07-06
Payer: COMMERCIAL

## 2021-07-06 VITALS
HEART RATE: 68 BPM | RESPIRATION RATE: 14 BRPM | OXYGEN SATURATION: 97 % | TEMPERATURE: 98.3 F | SYSTOLIC BLOOD PRESSURE: 120 MMHG | BODY MASS INDEX: 28.79 KG/M2 | HEIGHT: 68 IN | WEIGHT: 190 LBS | DIASTOLIC BLOOD PRESSURE: 76 MMHG

## 2021-07-06 DIAGNOSIS — F32.A DEPRESSION, UNSPECIFIED DEPRESSION TYPE: ICD-10-CM

## 2021-07-06 PROCEDURE — 99213 OFFICE O/P EST LOW 20 MIN: CPT | Performed by: STUDENT IN AN ORGANIZED HEALTH CARE EDUCATION/TRAINING PROGRAM

## 2021-07-06 RX ORDER — TRAZODONE HYDROCHLORIDE 50 MG/1
TABLET ORAL
COMMUNITY
Start: 2021-06-04 | End: 2022-06-14

## 2021-07-06 RX ORDER — DESVENLAFAXINE 100 MG/1
TABLET, EXTENDED RELEASE ORAL
COMMUNITY
Start: 2021-06-07 | End: 2021-07-06

## 2021-07-06 RX ORDER — DESVENLAFAXINE 100 MG/1
1 TABLET, EXTENDED RELEASE ORAL DAILY
Qty: 10 TABLET | Refills: 0 | Status: SHIPPED | OUTPATIENT
Start: 2021-07-06 | End: 2022-07-22

## 2021-07-06 RX ORDER — CLONIDINE 0.1 MG/24H
PATCH, EXTENDED RELEASE TRANSDERMAL
COMMUNITY
Start: 2021-05-25 | End: 2021-07-06

## 2021-07-06 RX ORDER — HYDROXYZINE PAMOATE 50 MG/1
CAPSULE ORAL 3 TIMES DAILY PRN
COMMUNITY
Start: 2021-05-26 | End: 2021-11-02

## 2021-07-06 ASSESSMENT — FIBROSIS 4 INDEX: FIB4 SCORE: 1.19

## 2021-07-13 ENCOUNTER — GYNECOLOGY VISIT (OUTPATIENT)
Dept: OBGYN | Facility: CLINIC | Age: 41
End: 2021-07-13
Payer: COMMERCIAL

## 2021-07-13 VITALS — WEIGHT: 197 LBS | BODY MASS INDEX: 29.95 KG/M2 | SYSTOLIC BLOOD PRESSURE: 138 MMHG | DIASTOLIC BLOOD PRESSURE: 84 MMHG

## 2021-07-13 DIAGNOSIS — Z12.31 ENCOUNTER FOR SCREENING MAMMOGRAM FOR MALIGNANT NEOPLASM OF BREAST: ICD-10-CM

## 2021-07-13 DIAGNOSIS — Z30.432 ENCOUNTER FOR IUD REMOVAL: ICD-10-CM

## 2021-07-13 PROCEDURE — 58300 INSERT INTRAUTERINE DEVICE: CPT | Mod: 51 | Performed by: OBSTETRICS & GYNECOLOGY

## 2021-07-13 PROCEDURE — 58301 REMOVE INTRAUTERINE DEVICE: CPT | Performed by: OBSTETRICS & GYNECOLOGY

## 2021-07-13 ASSESSMENT — FIBROSIS 4 INDEX: FIB4 SCORE: 1.19

## 2021-07-13 NOTE — NON-PROVIDER
Pt here for new pt appt  Pt states here to have IUD Mirena Removed, and Possible Reinsertion    Pharmacy verified  Good #:     LMP: 07/12/21  PAP: 04/2019 WNL   BC: Shivani   MAMMO: 08/2020 Benign

## 2021-07-14 NOTE — PROGRESS NOTES
Chief complaint: IUD removal with reinsertion    S: Patient presents for IUD removal due to expiration    O: VSS, afebrile  Gen - NAD, comfortable  PELVIC EXAM -   Normal external female genitalia  BUS within normal limits, no lesions  Spec: cervix has no lesions, normal physiologic white discharge, IUD strings visualized from cervix  Vaginal wall: pink and moist, normal rugae, no lesions    Procedure - IUD removal:  Pt counseled on IUD removal. Risk for cramping, bleeding discussed.  Consent form signed.  Speculum placed in the vagina and cervix visualized. IUD strings seen. IUD strings grasped with ring forceps and removed intact. Hemostasis noted. Pt tolerated procedure well.    Procedure note  Urine pregnancy test is negative, informed consent was previously signed  The bimanual exam is performed the uterus is noted to be 8 weeks in size and is mid position  A speculum was inserted into the vagina, the cervix was cleansed with Betadine swabs x3  Tenaculum was placed on the anterior lip of the cervix  The uterus was sounded to a depth of 7 centimeters  The IUD is placed under sterile conditions,   The strings trimmed to approximately 3 cm  Tenaculum was removed from the cervix and hemostasis was achieved with silver nitrate  The patient tolerated the procedure well      A: IUD removal with reinsertion    P: Today the patient is counseled on the risks of IUD insertion. I also discussed with the patient the risk of infection on insertion, and had asked the patient to remain on pelvic rest for one week following the insertion. We also discussed the risk of IUD expulsion, the risk of uterine perforation and IUD migration. If the IUD does migrate the patient may require a separate procedure such as a laparoscopy to retrieve the migrated IUD. I also discussed the 1% risk of pregnancy with IUD use. Also discussed with patient today increased risk of ectopic pregnancy with IUD use.  Also discussed today the possibility  that IUD may need to be removed secondary to bleeding profile or pain. Also discussed were the possibility that partner can feel the IUD during intercourse. I also discussed the side effects of Mirena which can be amenorrhea or dysfunctional uterine bleeding or spotting.  Patient had the opportunity to ask questions regarding insertion, risks and benefits, all questions are answered in their entirety.  Informed consent is signed.    Patient is asked to followup in 4 to 6 weeks for IUD check. The patient is asked to remain on pelvic rest for one week. She is asked to return to office sooner as needed for heavy vaginal bleeding, uncontrolled pain, fever, or any other concerns.    Mammogram order also given.  Patient to follow-up for annual exam later this year

## 2021-08-10 ENCOUNTER — OFFICE VISIT (OUTPATIENT)
Dept: URGENT CARE | Facility: PHYSICIAN GROUP | Age: 41
End: 2021-08-10
Payer: COMMERCIAL

## 2021-08-10 ENCOUNTER — HOSPITAL ENCOUNTER (OUTPATIENT)
Facility: MEDICAL CENTER | Age: 41
End: 2021-08-10
Attending: NURSE PRACTITIONER
Payer: COMMERCIAL

## 2021-08-10 VITALS
DIASTOLIC BLOOD PRESSURE: 74 MMHG | HEART RATE: 95 BPM | TEMPERATURE: 97.7 F | BODY MASS INDEX: 29.33 KG/M2 | HEIGHT: 69 IN | OXYGEN SATURATION: 95 % | RESPIRATION RATE: 18 BRPM | SYSTOLIC BLOOD PRESSURE: 122 MMHG | WEIGHT: 198 LBS

## 2021-08-10 DIAGNOSIS — R11.0 NAUSEA: ICD-10-CM

## 2021-08-10 DIAGNOSIS — J02.9 SORE THROAT: ICD-10-CM

## 2021-08-10 DIAGNOSIS — R19.7 DIARRHEA, UNSPECIFIED TYPE: ICD-10-CM

## 2021-08-10 DIAGNOSIS — R51.9 ACUTE NONINTRACTABLE HEADACHE, UNSPECIFIED HEADACHE TYPE: ICD-10-CM

## 2021-08-10 LAB
INT CON NEG: NEGATIVE
INT CON POS: POSITIVE
S PYO AG THROAT QL: NORMAL

## 2021-08-10 PROCEDURE — 87880 STREP A ASSAY W/OPTIC: CPT | Performed by: NURSE PRACTITIONER

## 2021-08-10 PROCEDURE — U0003 INFECTIOUS AGENT DETECTION BY NUCLEIC ACID (DNA OR RNA); SEVERE ACUTE RESPIRATORY SYNDROME CORONAVIRUS 2 (SARS-COV-2) (CORONAVIRUS DISEASE [COVID-19]), AMPLIFIED PROBE TECHNIQUE, MAKING USE OF HIGH THROUGHPUT TECHNOLOGIES AS DESCRIBED BY CMS-2020-01-R: HCPCS

## 2021-08-10 PROCEDURE — U0005 INFEC AGEN DETEC AMPLI PROBE: HCPCS

## 2021-08-10 PROCEDURE — 99213 OFFICE O/P EST LOW 20 MIN: CPT | Performed by: NURSE PRACTITIONER

## 2021-08-10 RX ORDER — ONDANSETRON 4 MG/1
4 TABLET, ORALLY DISINTEGRATING ORAL EVERY 6 HOURS PRN
Qty: 10 TABLET | Refills: 0 | Status: SHIPPED | OUTPATIENT
Start: 2021-08-10 | End: 2021-11-04

## 2021-08-10 ASSESSMENT — ENCOUNTER SYMPTOMS
SHORTNESS OF BREATH: 0
ORTHOPNEA: 0
WHEEZING: 0
MYALGIAS: 0
DIZZINESS: 0
PALPITATIONS: 0
ABDOMINAL PAIN: 0
DIARRHEA: 1
EYE REDNESS: 0
NAUSEA: 1
HEADACHES: 1
CHILLS: 0
EYE DISCHARGE: 0
FEVER: 0
COUGH: 0
NECK PAIN: 0
VOMITING: 0
WEAKNESS: 0
CONSTIPATION: 0
SORE THROAT: 0

## 2021-08-10 ASSESSMENT — FIBROSIS 4 INDEX: FIB4 SCORE: 1.19

## 2021-08-11 LAB
COVID ORDER STATUS COVID19: NORMAL
SARS-COV-2 RNA RESP QL NAA+PROBE: NOTDETECTED
SPECIMEN SOURCE: NORMAL

## 2021-08-11 NOTE — PROGRESS NOTES
Subjective:      Mattie Hicks is a 41 y.o. female who presents with Sore Throat (headache, nausea, sweats x3 days)            HPI  Headache, nausea, sweats x 3 days. Co-worker tested positive for COVID 2 days ago, last seen 3 days ago. No vomiting, mild nausea, abdominal pain, but has diarrhea. Eat/drink well. Denies cough or shortness of breath. Sore throat, mild, 3/10 pain. Nasal congestion, no ear pain.  and son have similar symptoms.     PMH:  has a past medical history of Alcohol abuse, Back pain, Depression, Indigestion, and Seizure ([facility]). She also has no past medical history of Addisons disease ([facility]ly [facility]), Adrenal disorder ([facility]), Allergy, Anemia, Anginal syndrome ([facility]), Arrhythmia, Arthritis, Asthma, At risk for sleep apnea, Blood clotting disorder ([facility]), Blood transfusion without reported diagnosis, Bronchitis, Cancer ([facility]), Cataract, Congestive heart failure ([facility]), COPD (chronic obstructive pulmonary disease) (Formerly [facility]), Cushings syndrome ([facility]), Diabetes ([facility]), Diabetic neuropathy ([facility]), Dialysis patient (Formerly [facility]), Disorder of thyroid, Fall, GERD (gastroesophageal reflux disease), Glaucoma, Goiter, Gynecological disorder, Head ache, Heart murmur, Heart valve disease, Hemorrhagic disorder ([facility]), HIV (human immunodeficiency virus infection) ([facility]ly [facility]), Hyperlipidemia, Hypertension, IBD (inflammatory bowel disease), Infectious disease, Jaundice, Meningitis, Migraine, Muscle disorder, Myocardial infarct ([facility]), Osteoporosis, Pacemaker, Parathyroid disorder ([facility]), Pituitary disease ([facility]), Pneumonia, Pulmonary emphysema ([facility]), Renal disorder, Rheumatic fever, Sickle cell disease ([facility]), Stroke ([facility]), Tuberculosis, Urinary incontinence, or Urinary tract infection.  MEDS:   Current Outpatient Medications:   •  hydrOXYzine pamoate (VISTARIL) 50 MG Cap, Take  by mouth 3 times a day as needed., Disp: , Rfl:   •  traZODone (DESYREL) 50 MG Tab, TAKE 2 TABLETS BY MOUTH AT BEDTIME FOR INSOMNIA, Disp: , Rfl:   •  Desvenlafaxine   "MG TABLET SR 24 HR, Take 1 tablet by mouth every day., Disp: 10 tablet, Rfl: 0  •  levonorgestrel (MIRENA, 52 MG,) 20 MCG/24HR IUD, 1 Each by Intrauterine route Continuous., Disp: , Rfl:   ALLERGIES:   Allergies   Allergen Reactions   • Prednisone Anxiety     SURGHX:   Past Surgical History:   Procedure Laterality Date   • GYN SURGERY     • PRIMARY C SECTION       SOCHX:  reports that she has been smoking cigarettes. She has been smoking about 0.00 packs per day. She has never used smokeless tobacco. She reports previous alcohol use of about 7.2 - 9.6 oz of alcohol per week. She reports that she does not use drugs.  FH: Family history was reviewed, no pertinent findings to report    Review of Systems   Constitutional: Negative for chills, fever and malaise/fatigue.        \"sweats\".   HENT: Positive for congestion. Negative for ear pain and sore throat.    Eyes: Negative for discharge and redness.   Respiratory: Negative for cough, shortness of breath and wheezing.    Cardiovascular: Negative for chest pain, palpitations and orthopnea.   Gastrointestinal: Positive for diarrhea and nausea. Negative for abdominal pain, constipation and vomiting.   Musculoskeletal: Negative for myalgias and neck pain.   Skin: Negative for itching and rash.   Neurological: Positive for headaches. Negative for dizziness and weakness.   Endo/Heme/Allergies: Negative for environmental allergies.   All other systems reviewed and are negative.         Objective:     /74 (BP Location: Left arm, Patient Position: Sitting, BP Cuff Size: Small adult)   Pulse 95   Temp 36.5 °C (97.7 °F) (Temporal)   Resp 18   Ht 1.753 m (5' 9\")   Wt 89.8 kg (198 lb)   SpO2 95%   BMI 29.24 kg/m²      Physical Exam  Vitals reviewed.   Constitutional:       General: She is awake. She is not in acute distress.     Appearance: Normal appearance. She is well-developed. She is not ill-appearing, toxic-appearing or diaphoretic.   HENT:      Head: " Normocephalic.      Right Ear: Tympanic membrane, ear canal and external ear normal.      Left Ear: Tympanic membrane, ear canal and external ear normal.      Nose: Nose normal.      Mouth/Throat:      Lips: Pink.      Mouth: Mucous membranes are dry.      Pharynx: Oropharynx is clear. Uvula midline.   Eyes:      Conjunctiva/sclera: Conjunctivae normal.      Pupils: Pupils are equal, round, and reactive to light.   Cardiovascular:      Rate and Rhythm: Normal rate.   Pulmonary:      Effort: Pulmonary effort is normal.   Musculoskeletal:         General: Normal range of motion.      Cervical back: Normal range of motion and neck supple.   Skin:     General: Skin is warm and dry.   Neurological:      Mental Status: She is alert and oriented to person, place, and time.   Psychiatric:         Attention and Perception: Attention normal.         Mood and Affect: Mood normal.         Speech: Speech normal.         Behavior: Behavior normal. Behavior is cooperative.                        Assessment/Plan:        1. Acute nonintractable headache, unspecified headache type    - POCT Rapid Strep A  - SARS-CoV-2 PCR (24 hour In-House): Collect NP swab in VTM; Future    2. Sore throat    - POCT Rapid Strep A: NEG  - SARS-CoV-2 PCR (24 hour In-House): Collect NP swab in VTM; Future    3. Nausea    - ondansetron (ZOFRAN ODT) 4 MG TABLET DISPERSIBLE; Take 1 tablet by mouth every 6 hours as needed.  Dispense: 10 tablet; Refill: 0    4. Diarrhea, unspecified type  -over the counter Imodium  -diet modification, liquid to bland diet    -Stay home isolated from others until COVID test resulted the follow CDC guidelines for positive cases as discussed  -Increase water intake  -May use Tylenol/Ibuprofen as needed for fever or body aches  -Get rest  -Salt water gargle as needed   -Flonase, saline nasal spray as needed for nasal congestion  -May use over the counter cough suppressant medications like plain Robitussin/Delsym as needed    -Monitor for fevers, worse cough, shortness of breath, chest pain, chest tightness, sinus problems- need re-evaluation  -MyChart release for result, may take up to 24-36 hrs for result, patient notified

## 2021-08-20 ENCOUNTER — OFFICE VISIT (OUTPATIENT)
Dept: URGENT CARE | Facility: PHYSICIAN GROUP | Age: 41
End: 2021-08-20
Payer: COMMERCIAL

## 2021-08-20 ENCOUNTER — HOSPITAL ENCOUNTER (OUTPATIENT)
Facility: MEDICAL CENTER | Age: 41
End: 2021-08-20
Attending: FAMILY MEDICINE
Payer: COMMERCIAL

## 2021-08-20 VITALS
DIASTOLIC BLOOD PRESSURE: 76 MMHG | RESPIRATION RATE: 18 BRPM | OXYGEN SATURATION: 95 % | HEART RATE: 96 BPM | HEIGHT: 68 IN | BODY MASS INDEX: 29.86 KG/M2 | WEIGHT: 197 LBS | TEMPERATURE: 98 F | SYSTOLIC BLOOD PRESSURE: 126 MMHG

## 2021-08-20 DIAGNOSIS — J98.8 RTI (RESPIRATORY TRACT INFECTION): ICD-10-CM

## 2021-08-20 LAB — COVID ORDER STATUS COVID19: NORMAL

## 2021-08-20 PROCEDURE — U0005 INFEC AGEN DETEC AMPLI PROBE: HCPCS

## 2021-08-20 PROCEDURE — U0003 INFECTIOUS AGENT DETECTION BY NUCLEIC ACID (DNA OR RNA); SEVERE ACUTE RESPIRATORY SYNDROME CORONAVIRUS 2 (SARS-COV-2) (CORONAVIRUS DISEASE [COVID-19]), AMPLIFIED PROBE TECHNIQUE, MAKING USE OF HIGH THROUGHPUT TECHNOLOGIES AS DESCRIBED BY CMS-2020-01-R: HCPCS

## 2021-08-20 PROCEDURE — 99213 OFFICE O/P EST LOW 20 MIN: CPT | Performed by: FAMILY MEDICINE

## 2021-08-20 RX ORDER — DESVENLAFAXINE 100 MG/1
1 TABLET, EXTENDED RELEASE ORAL
COMMUNITY
Start: 2021-07-23 | End: 2021-08-20

## 2021-08-20 RX ORDER — LORAZEPAM 1 MG/1
1 TABLET ORAL
COMMUNITY
Start: 2021-07-23

## 2021-08-20 ASSESSMENT — FIBROSIS 4 INDEX: FIB4 SCORE: 1.19

## 2021-08-20 ASSESSMENT — ENCOUNTER SYMPTOMS: COUGH: 1

## 2021-08-20 NOTE — PROGRESS NOTES
"Subjective     Mattie Hicks is a 41 y.o. female who presents with Coronavirus Screening (exposed)      - This is a pleasant and nontoxic appearing 41 y.o. female with c/o last night staring having malaise-aches, subjective fever, some stuffy nose/cough. 2 family members at home w/ c19. No NV/CP/SOB      ALLERGIES:  Prednisone     PMH:  Past Medical History:   Diagnosis Date   • Alcohol abuse    • Back pain    • Depression    • Indigestion    • Seizure (HCC)         PSH:  Past Surgical History:   Procedure Laterality Date   • GYN SURGERY     • PRIMARY C SECTION         MEDS:    Current Outpatient Medications:   •  LORazepam (ATIVAN) 1 MG Tab, Take 1 mg by mouth every day., Disp: , Rfl:   •  ondansetron (ZOFRAN ODT) 4 MG TABLET DISPERSIBLE, Take 1 tablet by mouth every 6 hours as needed., Disp: 10 tablet, Rfl: 0  •  hydrOXYzine pamoate (VISTARIL) 50 MG Cap, Take  by mouth 3 times a day as needed., Disp: , Rfl:   •  traZODone (DESYREL) 50 MG Tab, TAKE 2 TABLETS BY MOUTH AT BEDTIME FOR INSOMNIA, Disp: , Rfl:   •  Desvenlafaxine  MG TABLET SR 24 HR, Take 1 tablet by mouth every day., Disp: 10 tablet, Rfl: 0  •  levonorgestrel (MIRENA, 52 MG,) 20 MCG/24HR IUD, 1 Each by Intrauterine route Continuous., Disp: , Rfl:     ** I have documented what I find to be significant in regards to past medical, social, family and surgical history  in my HPI or under PMH/PSH/FH review section, otherwise it is noncontributory **           HPI    Review of Systems   HENT: Positive for congestion.    Respiratory: Positive for cough.    All other systems reviewed and are negative.             Objective     /76 (BP Location: Right arm)   Pulse 96   Temp 36.7 °C (98 °F) (Temporal)   Resp 18   Ht 1.727 m (5' 8\")   Wt 89.4 kg (197 lb)   SpO2 95%   BMI 29.95 kg/m²      Physical Exam  Vitals and nursing note reviewed.   Constitutional:       General: She is not in acute distress.     Appearance: Normal appearance. She is " well-developed.   HENT:      Head: Normocephalic and atraumatic.   Eyes:      General: No scleral icterus.  Cardiovascular:      Heart sounds: Normal heart sounds. No murmur heard.     Pulmonary:      Effort: Pulmonary effort is normal. No respiratory distress.      Breath sounds: Normal breath sounds.   Skin:     Coloration: Skin is not jaundiced or pale.   Neurological:      Mental Status: She is alert.      Motor: No abnormal muscle tone.   Psychiatric:         Mood and Affect: Mood normal.         Behavior: Behavior normal.                Assessment & Plan          1. RTI (respiratory tract infection)  SARS-CoV-2 PCR (24 hour In-House): Collect NP swab in VTM       - Dx, plan & d/c instructions discussed w/ patient   - Self isolate, call back in 2-3 days for CV19 results   - Rest, stay hydrated OTC Motrin and/or Tylenol as needed  - E.R. precautions discussed     Asked to kindly follow up with their PCP's office in 2-3 days for a recheck, ER if not improving or feeling/getting worse.    Any realistic side effects of medications that may have been given today reviewed.     Patient left in stable condition      reviewed if narcotics given

## 2021-08-20 NOTE — NON-PROVIDER
Patient stated she had a negative covid test 8 days ago. Family members tested positive for covid on 8/17, patient developed symptoms 8/19. She is experiencing chills, nausea, headache, congestion, sore throat, loss of taste

## 2021-08-22 LAB
SARS-COV-2 RNA RESP QL NAA+PROBE: DETECTED
SPECIMEN SOURCE: ABNORMAL

## 2021-08-23 ENCOUNTER — TELEPHONE (OUTPATIENT)
Dept: URGENT CARE | Facility: PHYSICIAN GROUP | Age: 41
End: 2021-08-23

## 2021-08-23 NOTE — TELEPHONE ENCOUNTER
Kindly call (DOCUMENT CALL OR ATTEMPTED CALL IN [facility]) and let patient know:           Hi,     Your recent coronavirus nose swab results are back and coronavirus was detected. This means that you do have coronavirus.      Kindly self isolate at home for 10 days from the start of your symptoms. For most people this will be like a mild cold or flu. If you start feeling worse, develop trouble breathing or chest pain please go to ER.    Some people with coronavirus try over the counter supplements like Vitamin D, Vitamn C, Zinc and a daily baby aspirin (81 mg) as there might be some benefit from some of these.      Feel free to call clinic with any questions, thanks

## 2021-08-26 ENCOUNTER — OFFICE VISIT (OUTPATIENT)
Dept: URGENT CARE | Facility: PHYSICIAN GROUP | Age: 41
End: 2021-08-26
Payer: COMMERCIAL

## 2021-08-26 VITALS
OXYGEN SATURATION: 95 % | RESPIRATION RATE: 18 BRPM | HEIGHT: 68 IN | TEMPERATURE: 97.5 F | WEIGHT: 194 LBS | DIASTOLIC BLOOD PRESSURE: 78 MMHG | HEART RATE: 85 BPM | SYSTOLIC BLOOD PRESSURE: 120 MMHG | BODY MASS INDEX: 29.4 KG/M2

## 2021-08-26 DIAGNOSIS — U07.1 COVID-19: ICD-10-CM

## 2021-08-26 PROCEDURE — 99213 OFFICE O/P EST LOW 20 MIN: CPT | Mod: CS | Performed by: PHYSICIAN ASSISTANT

## 2021-08-26 RX ORDER — AZELASTINE 1 MG/ML
1 SPRAY, METERED NASAL 2 TIMES DAILY
Qty: 30 ML | Refills: 0 | Status: SHIPPED | OUTPATIENT
Start: 2021-08-26 | End: 2021-09-02

## 2021-08-26 ASSESSMENT — ENCOUNTER SYMPTOMS
HEADACHES: 0
DIZZINESS: 0
SINUS PAIN: 1
COUGH: 1
PALPITATIONS: 0
WHEEZING: 0
VOMITING: 0
SPUTUM PRODUCTION: 0
FEVER: 0
SORE THROAT: 0
DIAPHORESIS: 0
NAUSEA: 0
MYALGIAS: 0
SHORTNESS OF BREATH: 0
DIARRHEA: 0
ABDOMINAL PAIN: 0
CHILLS: 0
STRIDOR: 0

## 2021-08-26 ASSESSMENT — FIBROSIS 4 INDEX: FIB4 SCORE: 1.19

## 2021-08-26 NOTE — PROGRESS NOTES
Subjective:   Mattie Hicks is a 41 y.o. female who presents for Sinus Problem (pressure, congestion, dizziness, burning sensation in nasal passages)      HPI:  This is a very pleasant 41-year-old female currently infected with COVID-19.  Patient tested positive for Covid on 8/20/2021.  She is presenting today with concerns of a sinus infection.  States she has sinus pain and pressure over the bilateral maxillary sinuses.  Intermittent burning sensation in the nasal passages.  Pressure and pain has been constant for the last 5 days.  She has been taking Mucinex with minimal relief.  States she is not having a difficult time breathing.  No shortness of breath or chest pain.  Cough is improving.  She is presenting to the chronic requesting antibiotics.    Review of Systems   Constitutional: Positive for malaise/fatigue. Negative for chills, diaphoresis and fever.   HENT: Positive for congestion and sinus pain. Negative for ear pain and sore throat.    Respiratory: Positive for cough. Negative for sputum production, shortness of breath, wheezing and stridor.    Cardiovascular: Negative for chest pain and palpitations.   Gastrointestinal: Negative for abdominal pain, diarrhea, nausea and vomiting.   Musculoskeletal: Negative for myalgias.   Neurological: Negative for dizziness and headaches.   Endo/Heme/Allergies: Negative for environmental allergies.       Medications:    • azelastine  • Desvenlafaxine ER Tb24  • hydrOXYzine pamoate Caps  • levonorgestrel  • LORazepam Tabs  • ondansetron Tbdp  • traZODone Tabs    Allergies: Prednisone    Problem List: Mattie Hicks does not have any pertinent problems on file.    Surgical History:  Past Surgical History:   Procedure Laterality Date   • GYN SURGERY     • PRIMARY C SECTION         Past Social Hx: Mattie Hicks  reports that she has been smoking cigarettes. She has been smoking about 0.00 packs per day. She has never used smokeless tobacco. She reports previous alcohol  "use of about 7.2 - 9.6 oz of alcohol per week. She reports that she does not use drugs.     Past Family Hx:  Mattie Hicks family history includes COPD in her father; Heart Failure in her maternal grandfather; No Known Problems in her mother.     Problem list, medications, and allergies reviewed by myself today in Epic.     Objective:     /78 (BP Location: Right arm, Patient Position: Sitting, BP Cuff Size: Adult)   Pulse 85   Temp 36.4 °C (97.5 °F) (Temporal)   Resp 18   Ht 1.727 m (5' 8\")   Wt 88 kg (194 lb)   SpO2 95%   BMI 29.50 kg/m²     Physical Exam  Constitutional:       General: She is not in acute distress.     Appearance: Normal appearance. She is not ill-appearing, toxic-appearing or diaphoretic.   HENT:      Head: Normocephalic and atraumatic.      Comments: Tenderness to palpation over the bilateral maxillary sinuses.     Right Ear: Tympanic membrane, ear canal and external ear normal.      Left Ear: Tympanic membrane, ear canal and external ear normal.      Nose: Congestion present. No rhinorrhea.      Mouth/Throat:      Mouth: Mucous membranes are moist.      Pharynx: No oropharyngeal exudate or posterior oropharyngeal erythema.   Eyes:      Conjunctiva/sclera: Conjunctivae normal.   Cardiovascular:      Rate and Rhythm: Normal rate and regular rhythm.      Pulses: Normal pulses.      Heart sounds: Normal heart sounds.   Pulmonary:      Effort: Pulmonary effort is normal.      Breath sounds: Normal breath sounds. No wheezing or rhonchi.   Musculoskeletal:      Cervical back: Normal range of motion. No muscular tenderness.   Lymphadenopathy:      Cervical: No cervical adenopathy.   Skin:     General: Skin is warm and dry.      Capillary Refill: Capillary refill takes less than 2 seconds.   Neurological:      Mental Status: She is alert.   Psychiatric:         Mood and Affect: Mood normal.         Thought Content: Thought content normal.           Assessment/Plan:     Comments/MDM: "     • Differentials and treatment options discussed with patient at length today.  We discussed the symptoms of COVID-19.  Discussed this is a viral infection and antibiotics playing a role in treating this infection.  Her symptoms are consistent with COVID-19.  Informed the patient at this time antibiotics are not indicated.  Supportive care recommendations were discussed such as warm compresses, increase fluid intake, Mucinex and nasal spray.  Continue to monitor for any change in symptoms.  ER precautions discussed.     Diagnosis and associated orders:     1. COVID-19  azelastine (ASTELIN) 137 MCG/SPRAY nasal spray              Differential diagnosis, natural history, supportive care, and indications for immediate follow-up discussed.    Advised the patient to follow-up with the primary care physician for recheck, reevaluation, and consideration of further management.    Please note that this dictation was created using voice recognition software. I have made reasonable attempt to correct obvious errors, but I expect that there are errors of grammar and possibly content that I did not discover before finalizing the note.    This note was electronically signed by AMELIA Muse PA-C

## 2021-10-07 ENCOUNTER — TELEPHONE (OUTPATIENT)
Dept: SCHEDULING | Facility: IMAGING CENTER | Age: 41
End: 2021-10-07

## 2021-11-02 ENCOUNTER — OFFICE VISIT (OUTPATIENT)
Dept: MEDICAL GROUP | Facility: PHYSICIAN GROUP | Age: 41
End: 2021-11-02
Payer: COMMERCIAL

## 2021-11-02 VITALS
TEMPERATURE: 98 F | HEIGHT: 69 IN | SYSTOLIC BLOOD PRESSURE: 122 MMHG | DIASTOLIC BLOOD PRESSURE: 72 MMHG | WEIGHT: 201 LBS | BODY MASS INDEX: 29.77 KG/M2 | OXYGEN SATURATION: 98 % | HEART RATE: 93 BPM | RESPIRATION RATE: 14 BRPM

## 2021-11-02 DIAGNOSIS — M25.521 RIGHT ELBOW PAIN: ICD-10-CM

## 2021-11-02 DIAGNOSIS — Z13.1 ENCOUNTER FOR SCREENING FOR DIABETES MELLITUS: ICD-10-CM

## 2021-11-02 DIAGNOSIS — F51.01 PRIMARY INSOMNIA: ICD-10-CM

## 2021-11-02 DIAGNOSIS — Z91.010 PEANUT ALLERGY: ICD-10-CM

## 2021-11-02 DIAGNOSIS — F41.1 GAD (GENERALIZED ANXIETY DISORDER): ICD-10-CM

## 2021-11-02 DIAGNOSIS — F10.29 ALCOHOL DEPENDENCE WITH UNSPECIFIED ALCOHOL-INDUCED DISORDER (HCC): ICD-10-CM

## 2021-11-02 DIAGNOSIS — Z13.29 THYROID DISORDER SCREEN: ICD-10-CM

## 2021-11-02 DIAGNOSIS — E78.5 DYSLIPIDEMIA: ICD-10-CM

## 2021-11-02 DIAGNOSIS — R74.8 ELEVATED LIVER ENZYMES: ICD-10-CM

## 2021-11-02 DIAGNOSIS — R71.8 ELEVATED MCV: ICD-10-CM

## 2021-11-02 DIAGNOSIS — Z13.21 ENCOUNTER FOR VITAMIN DEFICIENCY SCREENING: ICD-10-CM

## 2021-11-02 DIAGNOSIS — K76.0 FATTY LIVER: ICD-10-CM

## 2021-11-02 DIAGNOSIS — F33.0 MILD EPISODE OF RECURRENT MAJOR DEPRESSIVE DISORDER (HCC): ICD-10-CM

## 2021-11-02 PROBLEM — F32.9 MDD (MAJOR DEPRESSIVE DISORDER): Status: ACTIVE | Noted: 2019-08-12

## 2021-11-02 PROCEDURE — 99214 OFFICE O/P EST MOD 30 MIN: CPT | Performed by: INTERNAL MEDICINE

## 2021-11-02 RX ORDER — DESVENLAFAXINE 100 MG/1
1 TABLET, EXTENDED RELEASE ORAL
COMMUNITY
Start: 2021-10-21 | End: 2022-06-14

## 2021-11-02 ASSESSMENT — FIBROSIS 4 INDEX: FIB4 SCORE: 1.19

## 2021-11-02 NOTE — PATIENT INSTRUCTIONS
[facility]  Ask your health care provider which exercises are safe for you. Do exercises exactly as told by your health care provider and adjust them as directed. It is normal to feel mild stretching, pulling, tightness, or discomfort as you do these exercises. Stop right away if you feel sudden pain or your pain gets worse. Do not begin these exercises until told by your health care provider.  Stretching and range-of-motion exercises  These exercises warm up your muscles and joints and improve the movement and flexibility of your elbow.  Wrist extension    1. Straighten your left / right elbow in front of you with your palm facing up toward the ceiling.  ? If told by your health care provider, bend your left / right elbow to a 90-degree angle (right angle) at your side.  2. With your other hand, gently pull your left / right hand and fingers toward the floor (extension). Stop when you feel a gentle stretch on the palm side of your forearm.  3. Hold this position for __________ seconds.  Repeat __________ times. Complete this exercise __________ times a day.  Wrist flexion    1. Straighten your left / right elbow in front of you with your palm facing down toward the floor.  ? If told by your health care provider, bend your left / right elbow to a 90-degree angle (right angle) at your side.  2. With your other hand, gently push over the back of your left / right hand so your fingers point toward the floor (flexion). Stop when you feel a gentle stretch on the back of your forearm.  3. Hold this position for __________ seconds.  Repeat __________ times. Complete this exercise __________ times a day.  Forearm rotation, supination  1. Sit or stand with your elbows at your side.  2. Bend your left / right elbow to a 90-degree angle (right angle).  3. Using your uninjured hand, turn your left / right palm up toward the ceiling (supination) until you feel a gentle stretch along the inside of your forearm.  4. Hold  this position for __________ seconds.  Repeat __________ times. Complete this exercise __________ times a day.  Forearm rotation, pronation  1. Sit or stand with your elbows at your side.  2. Bend your left / right elbow to a 90-degree angle (right angle).  3. Using your uninjured hand, turn your left / right palm down toward the floor (pronation) until you feel a gentle stretch along the top of your forearm.  4. Hold this position for __________ seconds.  Repeat __________ times. Complete this exercise __________ times a day.  Strengthening exercises  These exercises build strength and endurance in your elbow. Endurance is the ability to use your muscles for a long time, even after they get tired.  Wrist flexion    1. Sit with your left / right forearm supported on a table or other surface and your palm turned up toward the ceiling. Let your left / right wrist extend over the edge of the surface.  2. Hold a __________ weight or a piece of rubber exercise band or tubing.  ? If using a rubber exercise band or tubing, hold the other end of the tubing with your other hand.  3. Slowly bend your wrist so your hand moves up toward the ceiling (flexion). Try to only move your wrist and keep the rest of your arm still.  4. Hold this position for __________ seconds.  5. Slowly return to the starting position.  Repeat __________ times. Complete this exercise __________ times a day.  Wrist flexion, eccentric  1. Sit with your left / right forearm palm-up and supported on a table or other surface. Let your left / right wrist extend over the edge of the surface.  2. Hold a __________ weight or a piece of rubber exercise band or tubing in your left / right hand.  ? If using a rubber exercise band or tubing, hold the other end of the tubing with your other hand.  3. Use your uninjured hand to move your left / right hand up toward the ceiling.  4. Take your uninjured hand away and slowly return to the starting position using only  your left / right hand (eccentric flexion).  Repeat __________ times. Complete this exercise __________ times a day.  Forearm rotation, pronation  To do this exercise, you will need a lightweight hammer or rubber mallet.  1. Sit with your left / right forearm supported on a table or other surface. Bend your elbow to a 90-degree angle (right angle). Position your forearm so that your palm is facing up toward the ceiling, with your hand resting over the edge of the table.  2. Hold a hammer in your left / right hand.  ? To make this exercise easier, hold the hammer near the head of the hammer.  ? To make this exercise harder, hold the hammer near the end of the handle.  3. Without moving your elbow, slowly turn (rotate) your forearm so your palm faces down toward the floor (pronation).  4. Hold this position for __________ seconds.  5. Slowly return to the starting position.  Repeat __________ times. Complete this exercise __________ times a day.  Shoulder blade squeeze  1. Sit in a stable chair or stand with good posture. If you are sitting down, do not let your back touch the back of the chair.  2. Your arms should be at your sides with your elbows bent to a 90-degree angle (right angle). Position your forearms so that your thumbs are facing the ceiling (neutral position).  3. Without lifting your shoulders up, squeeze your shoulder blades tightly together.  4. Hold this position for __________ seconds.  5. Slowly release and return to the starting position.  Repeat __________ times. Complete this exercise __________ times a day.  This information is not intended to replace advice given to you by your health care provider. Make sure you discuss any questions you have with your health care provider.  Document Released: 12/18/2006 Document Revised: 04/09/2020 Document Reviewed: 02/11/2020  Elsevier Patient Education © 2020 [employer]

## 2021-11-02 NOTE — PROGRESS NOTES
Subjective:     CC: Establish care    HISTORY OF THE PRESENT ILLNESS: Patient is a 41 y.o. female. This pleasant patient is here today to establish care and discuss the following issues:    The patient reports a longstanding history of recurrent episodes of excessive alcohol consumption.  She began to drink heavily during the COVID-19 pandemic.  She was drinking 5-6 nights weekly, typically a large [employer] sized bottle of vodka in a week.  She went into a residential treatment program for 1 month in May in [place].  When she was discharged she was given an injection of naltrexone.  She did well for a few months and then relapsed in September.  She is currently drinking 5 nights weekly, 2-3 shots of hard alcohol and a couple of beers per night.  She does have an appointment with a psychiatrist and hopes to be put back on naltrexone.  She does report a number of social stressors.  She states her work is stressful, she works for the [employer] and takes walk-in reports.  She has 2 children, ages 15 and 6.  She is also taking care of her mom who recently had ankle surgery.  She states that her  recently resumed drinking alcohol.  She has been finding that she has been self isolating.    The patient reports right elbow pain for 1 year.  It can be both stabbing and burning.  No antecedent trauma.  The pain comes and goes.  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  She denies any hand weakness, numbness or tingling down her arm.    She stated she had a remote episode of peanut consumption in which her throat felt like it was swelling.  She did not have any rash, itching, shortness of breath, or anaphylactic reaction.    Allergies: Prednisone    Current Outpatient Medications Ordered in Epic   Medication Sig Dispense Refill   • LORazepam (ATIVAN) 1 MG Tab Take 1 mg by mouth every day.     • traZODone (DESYREL) 50 MG Tab TAKE 2 TABLETS BY MOUTH AT BEDTIME FOR INSOMNIA    "  • Desvenlafaxine  MG TABLET SR 24 HR Take 1 tablet by mouth every day. 10 tablet 0   • levonorgestrel (MIRENA, 52 MG,) 20 MCG/24HR IUD 1 Each by Intrauterine route Continuous.     • Desvenlafaxine Succinate 100 MG TABLET SR 24 HR Take 1 Tablet by mouth.       No current Epic-ordered facility-administered medications on file.       Past Medical History:   Diagnosis Date   • Alcohol abuse    • Back pain    • Depression    • Dyslipidemia 2021   • Indigestion    • Seizure (HCC)        Past Surgical History:   Procedure Laterality Date   • GYN SURGERY     • PRIMARY C SECTION         Social History     Tobacco Use   • Smoking status: Current Every Day Smoker     Packs/day: 0.00     Types: Cigarettes     Last attempt to quit: 2015     Years since quittin.8   • Smokeless tobacco: Never Used   Vaping Use   • Vaping Use: Never used   Substance Use Topics   • Alcohol use: Not Currently     Alcohol/week: 7.2 - 9.6 oz     Types: 6 - 8 Cans of beer, 6 - 8 Shots of liquor per week   • Drug use: No       Social History     Social History Narrative   • Not on file       Family History   Problem Relation Age of Onset   • No Known Problems Mother    • COPD Father    • Heart Failure Maternal Grandfather        Health Maintenance: Completed    ROS:   Gen: no fevers/chills  Pulm: no sob, no cough  CV: no chest pain, no palpitations  GI: no nausea/vomiting, no diarrhea  Neuro: no headaches, no numbness/tingling      Objective:     Exam: /72   Pulse 93   Temp 36.7 °C (98 °F)   Resp 14   Ht 1.753 m (5' 9\")   Wt 91.2 kg (201 lb)   SpO2 98%  Body mass index is 29.68 kg/m².    General: Normal appearing. No distress.  HEENT: Normocephalic. Eyes conjunctiva clear lids without ptosis, pupils equal and reactive to light accommodation, oropharynx is without erythema, edema or exudates.   Pulmonary: Clear to ausculation.  Normal effort. No rales, ronchi, or wheezing.  Cardiovascular: Regular rate and rhythm without " murmur.   Abdomen: Soft, nontender, nondistended.   Musculoskeletal: Tenderness over the right olecranon, no swelling or erythema  Psych: Normal mood and affect. Alert and oriented x3. Judgment and insight is normal.    Assessment & Plan:   41 y.o. female with the following -    Alcohol dependence with unspecified alcohol-induced disorder (HCC)  This is a chronic condition.  Recurrent. The patient reports a longstanding history of recurrent episodes of excessive alcohol consumption.  She began to drink heavily during the COVID-19 pandemic.  She was drinking 5-6 nights weekly, typically a large [employer] sized bottle of vodka in a week.  She went into a residential treatment program for 1 month in May in [place].  When she was discharged she was given an injection of naltrexone.  She did well for a few months and then relapsed in September.  She is currently drinking 5 nights weekly, 2-3 shots of hard alcohol and a couple of beers per night.  She does have an appointment with a psychiatrist and hopes to be put back on naltrexone.     Right elbow pain  This is a chronic condition.  Stable.  The patient reports a 1 year history of intermittent right elbow pain.  The pain is located localized over the olecranon.  It does not radiate.  There is no associated hand weakness, or arm numbness or tingling.  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  -The patient was given a handout of elbow exercises  - DX-ELBOW-COMPLETE 3+ RIGHT; Future    KATTY (generalized anxiety disorder)  Mild episode of recurrent major depressive disorder (HCC)  desvenlafaxine  Ativan as needed 1 mg, 30 day supply lasts 2 to 3 months  - Desvenlafaxine Succinate 100 MG TABLET SR 24 HR; Take 1 Tablet by mouth.    Primary insomnia  This is a chronic condition.  Well-controlled.    -Continue trazodone 50 mg nightly     Fatty liver  Elevated liver enzymes  This is an acute condition.  The patient reports stable right upper  quadrant pain for many years.  Labs from 4/20/2021 showed an elevated AST and ALT of 62 and 62.  Right upper quadrant ultrasound on 4/20/2021 showed hepatomegaly and hepatic steatosis.  - Comp Metabolic Panel; Future,    Elevated MCV  This is a chronic condition.  Stable.  Labs from 4/20/2021 showed an elevated MCV of 100.7.  Hemoglobin and hematocrit were normal at 14.4 and 42.3.  The patient does report heavy alcohol consumption.  - VITAMIN B12; Future  - FOLATE; Future  - CBC WITHOUT DIFFERENTIAL; Future    Dyslipidemia  This is a chronic condition.  Stable.  Lipid panel from 5/1/2020 showed a total cholesterol 204, , HDL 68, triglycerides 144. The 10-year ASCVD risk score (Cesar DC Jr., et al., 2013) is: 1.6%  -Continue dietary management with a low-cholesterol diet given the patient's low 10-year ASCVD risk score  - Comp Metabolic Panel; Future  - Lipid Profile; Future    Peanut allergy  This is an acute condition.  The patient is requesting a referral to allergy and immunology for peanut allergy testing.  She stated she had a remote episode of peanut consumption in which her throat felt like it was swelling.  She did not have any rash, itching, shortness of breath, or anaphylactic reaction.  - Referral to Allergy    Encounter for vitamin deficiency screening  - VITAMIN D,25 HYDROXY; Future    Encounter for screening for diabetes mellitus  - HEMOGLOBIN A1C; Future    Thyroid disorder screen  - TSH WITH REFLEX TO FT4; Future    Return in about 2 weeks (around 11/16/2021).    Please note that this dictation was created using voice recognition software. I have made every reasonable attempt to correct obvious errors, but I expect that there are errors of grammar and possibly content that I did not discover before finalizing the note.

## 2021-11-04 PROBLEM — F10.20 ALCOHOL DEPENDENCE (HCC): Status: ACTIVE | Noted: 2021-11-04

## 2021-12-28 ENCOUNTER — HOSPITAL ENCOUNTER (OUTPATIENT)
Dept: LAB | Facility: MEDICAL CENTER | Age: 41
End: 2021-12-28
Attending: INTERNAL MEDICINE
Payer: COMMERCIAL

## 2021-12-28 DIAGNOSIS — Z13.1 ENCOUNTER FOR SCREENING FOR DIABETES MELLITUS: ICD-10-CM

## 2021-12-28 DIAGNOSIS — K76.0 FATTY LIVER: ICD-10-CM

## 2021-12-28 DIAGNOSIS — R74.8 ELEVATED LIVER ENZYMES: ICD-10-CM

## 2021-12-28 DIAGNOSIS — Z13.21 ENCOUNTER FOR VITAMIN DEFICIENCY SCREENING: ICD-10-CM

## 2021-12-28 DIAGNOSIS — Z13.29 THYROID DISORDER SCREEN: ICD-10-CM

## 2021-12-28 DIAGNOSIS — E78.5 DYSLIPIDEMIA: ICD-10-CM

## 2021-12-28 DIAGNOSIS — R71.8 ELEVATED MCV: ICD-10-CM

## 2021-12-28 LAB
25(OH)D3 SERPL-MCNC: 22 NG/ML (ref 30–100)
ALBUMIN SERPL BCP-MCNC: 4.7 G/DL (ref 3.2–4.9)
ALBUMIN/GLOB SERPL: 1.6 G/DL
ALP SERPL-CCNC: 70 U/L (ref 30–99)
ALT SERPL-CCNC: 28 U/L (ref 2–50)
ANION GAP SERPL CALC-SCNC: 13 MMOL/L (ref 7–16)
AST SERPL-CCNC: 31 U/L (ref 12–45)
BILIRUB SERPL-MCNC: 0.7 MG/DL (ref 0.1–1.5)
BUN SERPL-MCNC: 18 MG/DL (ref 8–22)
CALCIUM SERPL-MCNC: 9.3 MG/DL (ref 8.5–10.5)
CHLORIDE SERPL-SCNC: 104 MMOL/L (ref 96–112)
CHOLEST SERPL-MCNC: 208 MG/DL (ref 100–199)
CO2 SERPL-SCNC: 23 MMOL/L (ref 20–33)
CREAT SERPL-MCNC: 0.74 MG/DL (ref 0.5–1.4)
ERYTHROCYTE [DISTWIDTH] IN BLOOD BY AUTOMATED COUNT: 46.4 FL (ref 35.9–50)
EST. AVERAGE GLUCOSE BLD GHB EST-MCNC: 100 MG/DL
FASTING STATUS PATIENT QL REPORTED: NORMAL
FOLATE SERPL-MCNC: 12.6 NG/ML
GLOBULIN SER CALC-MCNC: 3 G/DL (ref 1.9–3.5)
GLUCOSE SERPL-MCNC: 89 MG/DL (ref 65–99)
HBA1C MFR BLD: 5.1 % (ref 4–5.6)
HCT VFR BLD AUTO: 41.6 % (ref 37–47)
HDLC SERPL-MCNC: 78 MG/DL
HGB BLD-MCNC: 13.8 G/DL (ref 12–16)
LDLC SERPL CALC-MCNC: 103 MG/DL
MCH RBC QN AUTO: 33.6 PG (ref 27–33)
MCHC RBC AUTO-ENTMCNC: 33.2 G/DL (ref 33.6–35)
MCV RBC AUTO: 101.2 FL (ref 81.4–97.8)
PLATELET # BLD AUTO: 232 K/UL (ref 164–446)
PMV BLD AUTO: 10.2 FL (ref 9–12.9)
POTASSIUM SERPL-SCNC: 4.4 MMOL/L (ref 3.6–5.5)
PROT SERPL-MCNC: 7.7 G/DL (ref 6–8.2)
RBC # BLD AUTO: 4.11 M/UL (ref 4.2–5.4)
SODIUM SERPL-SCNC: 140 MMOL/L (ref 135–145)
TRIGL SERPL-MCNC: 137 MG/DL (ref 0–149)
TSH SERPL DL<=0.005 MIU/L-ACNC: 1.98 UIU/ML (ref 0.38–5.33)
VIT B12 SERPL-MCNC: 303 PG/ML (ref 211–911)
WBC # BLD AUTO: 5.5 K/UL (ref 4.8–10.8)

## 2021-12-28 PROCEDURE — 83036 HEMOGLOBIN GLYCOSYLATED A1C: CPT

## 2021-12-28 PROCEDURE — 80061 LIPID PANEL: CPT

## 2021-12-28 PROCEDURE — 85027 COMPLETE CBC AUTOMATED: CPT

## 2021-12-28 PROCEDURE — 82746 ASSAY OF FOLIC ACID SERUM: CPT

## 2021-12-28 PROCEDURE — 82607 VITAMIN B-12: CPT

## 2021-12-28 PROCEDURE — 36415 COLL VENOUS BLD VENIPUNCTURE: CPT

## 2021-12-28 PROCEDURE — 80053 COMPREHEN METABOLIC PANEL: CPT

## 2021-12-28 PROCEDURE — 84443 ASSAY THYROID STIM HORMONE: CPT

## 2021-12-28 PROCEDURE — 82306 VITAMIN D 25 HYDROXY: CPT

## 2022-02-22 PROBLEM — E78.2 MIXED HYPERLIPIDEMIA: Status: ACTIVE | Noted: 2021-11-02

## 2022-02-22 PROBLEM — G89.29 CHRONIC RUQ PAIN: Status: ACTIVE | Noted: 2018-10-10

## 2022-02-22 PROBLEM — R74.8 ELEVATED LIVER ENZYMES: Status: RESOLVED | Noted: 2021-11-02 | Resolved: 2022-02-22

## 2022-02-22 PROBLEM — E55.9 VITAMIN D DEFICIENCY: Status: ACTIVE | Noted: 2022-02-22

## 2022-03-27 ENCOUNTER — OFFICE VISIT (OUTPATIENT)
Dept: URGENT CARE | Facility: PHYSICIAN GROUP | Age: 42
End: 2022-03-27
Payer: COMMERCIAL

## 2022-03-27 ENCOUNTER — HOSPITAL ENCOUNTER (OUTPATIENT)
Facility: MEDICAL CENTER | Age: 42
End: 2022-03-27
Attending: PHYSICIAN ASSISTANT
Payer: COMMERCIAL

## 2022-03-27 VITALS
HEART RATE: 89 BPM | DIASTOLIC BLOOD PRESSURE: 82 MMHG | WEIGHT: 205 LBS | BODY MASS INDEX: 30.36 KG/M2 | RESPIRATION RATE: 15 BRPM | HEIGHT: 69 IN | TEMPERATURE: 97.8 F | OXYGEN SATURATION: 95 % | SYSTOLIC BLOOD PRESSURE: 124 MMHG

## 2022-03-27 DIAGNOSIS — N30.01 ACUTE CYSTITIS WITH HEMATURIA: ICD-10-CM

## 2022-03-27 DIAGNOSIS — K59.00 CONSTIPATION, UNSPECIFIED CONSTIPATION TYPE: ICD-10-CM

## 2022-03-27 LAB
APPEARANCE UR: NORMAL
BILIRUB UR STRIP-MCNC: NEGATIVE MG/DL
COLOR UR AUTO: YELLOW
GLUCOSE UR STRIP.AUTO-MCNC: NEGATIVE MG/DL
KETONES UR STRIP.AUTO-MCNC: NEGATIVE MG/DL
LEUKOCYTE ESTERASE UR QL STRIP.AUTO: NORMAL
NITRITE UR QL STRIP.AUTO: NEGATIVE
PH UR STRIP.AUTO: 6.5 [PH] (ref 5–8)
PROT UR QL STRIP: NEGATIVE MG/DL
RBC UR QL AUTO: NORMAL
SP GR UR STRIP.AUTO: 1.02
UROBILINOGEN UR STRIP-MCNC: 1 MG/DL

## 2022-03-27 PROCEDURE — 87086 URINE CULTURE/COLONY COUNT: CPT

## 2022-03-27 PROCEDURE — 99214 OFFICE O/P EST MOD 30 MIN: CPT | Performed by: PHYSICIAN ASSISTANT

## 2022-03-27 PROCEDURE — 81002 URINALYSIS NONAUTO W/O SCOPE: CPT | Performed by: PHYSICIAN ASSISTANT

## 2022-03-27 RX ORDER — NITROFURANTOIN 25; 75 MG/1; MG/1
100 CAPSULE ORAL 2 TIMES DAILY
Qty: 14 CAPSULE | Refills: 0 | Status: SHIPPED | OUTPATIENT
Start: 2022-03-27 | End: 2022-04-03

## 2022-03-27 RX ORDER — LACTULOSE 10 G/15ML
20 SOLUTION ORAL DAILY
Qty: 946 ML | Refills: 0 | Status: SHIPPED | OUTPATIENT
Start: 2022-03-27 | End: 2022-06-14

## 2022-03-27 ASSESSMENT — ENCOUNTER SYMPTOMS
ANOREXIA: 1
VOMITING: 0
HEADACHES: 0
BLOATING: 1
BLOOD IN STOOL: 0
DIZZINESS: 0
RECTAL PAIN: 0
CHILLS: 0
BACK PAIN: 1
FEVER: 0
WHEEZING: 0
SHORTNESS OF BREATH: 0
FLANK PAIN: 1
ABDOMINAL PAIN: 1
COUGH: 0
FLATUS: 1
NAUSEA: 1
DIARRHEA: 0
CONSTIPATION: 1

## 2022-03-27 ASSESSMENT — FIBROSIS 4 INDEX: FIB4 SCORE: 1.06

## 2022-03-27 NOTE — PROGRESS NOTES
Subjective     Mattie Hicks is a 41 y.o. female who presents with Back Pain (Lower right side pain,dull pain x3 days.) and Painful Urination (-burning, not going frequently symptoms started 1 week ago, cloudy and strong smell .)            Dysuria   This is a new problem. Episode onset: 3 days  The problem occurs every urination. The problem has been gradually worsening. The quality of the pain is described as burning. The pain is moderate. There has been no fever. There is no history of pyelonephritis. Associated symptoms include flank pain (right side ), frequency, hematuria, nausea and urgency. Pertinent negatives include no chills, discharge, possible pregnancy or vomiting. She has tried nothing for the symptoms. There is no history of kidney stones or recurrent UTIs.   Constipation  This is a new problem. The current episode started 1 to 4 weeks ago. The problem is unchanged. Her stool frequency is 2 to 3 times per week. The stool is described as firm. The patient is not on a high fiber diet. Associated symptoms include abdominal pain, anorexia, back pain, bloating, flatus and nausea. Pertinent negatives include no diarrhea, difficulty urinating, fecal incontinence, fever, rectal pain or vomiting. She has tried stool softeners (Miralax) for the symptoms. The treatment provided no relief.       Past Medical History:   Diagnosis Date   • Alcohol abuse    • Back pain    • Depression    • Dyslipidemia 11/2/2021   • Elevated liver enzymes 11/2/2021   • Indigestion    • Seizure (HCC)        Past Surgical History:   Procedure Laterality Date   • GYN SURGERY     • PRIMARY C SECTION         .  Family History   Problem Relation Age of Onset   • No Known Problems Mother    • COPD Father    • Heart Failure Maternal Grandfather          Allergies   Allergen Reactions   • Prednisone Anxiety     f    Medications, Allergies, and current problem list reviewed today in Epic      Review of Systems   Constitutional: Negative  "for chills, fever and malaise/fatigue.   Respiratory: Negative for cough, shortness of breath and wheezing.    Cardiovascular: Negative for chest pain and leg swelling.   Gastrointestinal: Positive for abdominal pain, anorexia, bloating, constipation, flatus and nausea. Negative for blood in stool, diarrhea, rectal pain and vomiting.   Genitourinary: Positive for dysuria, flank pain (right side ), frequency, hematuria and urgency. Negative for difficulty urinating.   Musculoskeletal: Positive for back pain.   Skin: Negative for rash.   Neurological: Negative for dizziness and headaches.     All other systems reviewed and are negative.            Objective     /82 (BP Location: Left arm, Patient Position: Sitting, BP Cuff Size: Adult)   Pulse 89   Temp 36.6 °C (97.8 °F) (Tympanic)   Resp 15   Ht 1.753 m (5' 9\")   Wt 93 kg (205 lb)   SpO2 95%   BMI 30.27 kg/m²      Physical Exam  Constitutional:       General: She is not in acute distress.     Appearance: She is not ill-appearing.   HENT:      Head: Normocephalic and atraumatic.   Eyes:      Conjunctiva/sclera: Conjunctivae normal.   Cardiovascular:      Rate and Rhythm: Normal rate and regular rhythm.      Heart sounds: Normal heart sounds.   Pulmonary:      Effort: Pulmonary effort is normal. No respiratory distress.      Breath sounds: Normal breath sounds. No wheezing, rhonchi or rales.   Abdominal:      General: There is no distension.      Palpations: Abdomen is soft. There is no mass.      Tenderness: There is no abdominal tenderness. There is no right CVA tenderness, left CVA tenderness, guarding or rebound.   Skin:     General: Skin is warm and dry.      Findings: No rash.   Neurological:      General: No focal deficit present.      Mental Status: She is alert and oriented to person, place, and time.   Psychiatric:         Mood and Affect: Mood normal.         Behavior: Behavior normal.         Thought Content: Thought content normal.         " Judgment: Judgment normal.                             Assessment & Plan        1. Acute cystitis with hematuria  cefTRIAXone (Rocephin) 1 g, lidocaine (XYLOCAINE) 1 % 3.6 mL for IM use    nitrofurantoin (MACROBID) 100 MG Cap    POCT Urinalysis    URINE CULTURE(NEW)   2. Constipation, unspecified constipation type  lactulose 10 GM/15ML Solution         Possible early Pyelonephritis- vitals stable. No CVA TTP. Patient refuses Cipro. Rocephin 1 gm and Macrobid. Culture urine and change treatment plan accordingly./    Lactulose for constipation.      The patient demonstrated a good understanding and agreed with the treatment plan.    Jannet Schaefer P.A.-C.

## 2022-03-30 LAB
BACTERIA UR CULT: NORMAL
SIGNIFICANT IND 70042: NORMAL
SITE SITE: NORMAL
SOURCE SOURCE: NORMAL

## 2022-05-03 ENCOUNTER — HOSPITAL ENCOUNTER (OUTPATIENT)
Dept: RADIOLOGY | Facility: MEDICAL CENTER | Age: 42
End: 2022-05-03
Attending: OBSTETRICS & GYNECOLOGY
Payer: COMMERCIAL

## 2022-05-03 DIAGNOSIS — Z12.31 ENCOUNTER FOR SCREENING MAMMOGRAM FOR MALIGNANT NEOPLASM OF BREAST: ICD-10-CM

## 2022-05-09 ENCOUNTER — TELEPHONE (OUTPATIENT)
Dept: MEDICAL GROUP | Facility: PHYSICIAN GROUP | Age: 42
End: 2022-05-09
Payer: COMMERCIAL

## 2022-05-09 NOTE — TELEPHONE ENCOUNTER
1. Caller Name: Mattie Hicks                          Call Back Number: [phone] (home)         How would the patient prefer to be contacted with a response:     Pt states she needs an order to [facility] for a dx bilateral mammogram with ultrasound.

## 2022-05-10 RX ORDER — TRAZODONE HYDROCHLORIDE 100 MG/1
100 TABLET ORAL
COMMUNITY
Start: 2022-04-19 | End: 2024-02-05

## 2022-05-10 RX ORDER — DESVENLAFAXINE SUCCINATE 50 MG/1
50 TABLET, EXTENDED RELEASE ORAL
COMMUNITY
Start: 2022-04-19 | End: 2023-12-29

## 2022-05-11 ENCOUNTER — TELEMEDICINE (OUTPATIENT)
Dept: MEDICAL GROUP | Facility: PHYSICIAN GROUP | Age: 42
End: 2022-05-11
Payer: COMMERCIAL

## 2022-05-11 VITALS — RESPIRATION RATE: 18 BRPM | HEIGHT: 69 IN | BODY MASS INDEX: 30.36 KG/M2 | WEIGHT: 205 LBS

## 2022-05-11 DIAGNOSIS — J01.00 ACUTE MAXILLARY SINUSITIS, RECURRENCE NOT SPECIFIED: ICD-10-CM

## 2022-05-11 DIAGNOSIS — N64.4 BREAST PAIN: ICD-10-CM

## 2022-05-11 DIAGNOSIS — M26.609 TMJ DYSFUNCTION: ICD-10-CM

## 2022-05-11 PROCEDURE — 99213 OFFICE O/P EST LOW 20 MIN: CPT | Mod: 95 | Performed by: INTERNAL MEDICINE

## 2022-05-11 RX ORDER — AZITHROMYCIN 250 MG/1
TABLET, FILM COATED ORAL
Qty: 6 TABLET | Refills: 0 | Status: SHIPPED | OUTPATIENT
Start: 2022-05-11 | End: 2022-07-22

## 2022-05-11 ASSESSMENT — FIBROSIS 4 INDEX: FIB4 SCORE: 1.09

## 2022-05-11 ASSESSMENT — PATIENT HEALTH QUESTIONNAIRE - PHQ9: CLINICAL INTERPRETATION OF PHQ2 SCORE: 0

## 2022-05-11 NOTE — PROGRESS NOTES
Virtual Visit: Established Patient   This visit was conducted via Zoom using secure and encrypted videoconferencing technology. The patient was in a private location in the state of [place].    The patient's identity was confirmed and verbal consent was obtained for this virtual visit.    Subjective:   CC:   Chief Complaint   Patient presents with   • Orders Needed     Dx mammogram       Mattie Hicks is a 42 y.o. female presenting for evaluation and management of:    The patient presents today to request an order for diagnostic mammogram.  This is a virtual visit so I am not able to examine her breasts.  She reports tenderness behind her nipples bilaterally over the last couple of weeks.  She has not noticed any skin changes, palpable masses, or nipple discharge.  She was scheduled for screening mammogram, but when she indicated she was having pain, they recommended a diagnostic mammogram.    The patient is also reporting a 3-week history of jaw tightness that comes and goes.  She states it was initially worse on her left, but is now on both sides.  She also reports some throat irritation as well as pain along her bilateral TMJ areas.  She was at the dentist yesterday and had x-rays obtained.  The dentist told her that it was possible she could have inflammation and/or infection of her sinuses.  She does report a remote history of recurrent sinus infections for which she had corrective surgery approximately 12 years ago.  She has not had any recurrent problems with her sinuses since that time.  She also denies additional sinusitis symptoms including nasal and sinus congestion, rhinorrhea, and postnasal drip, fever.    ROS   Denies any recent fevers or chills. No nausea or vomiting. No chest pains or shortness of breath.     Allergies   Allergen Reactions   • Prednisone Anxiety       Current medicines (including changes today)  Current Outpatient Medications   Medication Sig Dispense Refill   • azithromycin  (ZITHROMAX) 250 MG Tab Take two tablets on the first day, then one tablet daily for 4 days. 6 Tablet 0   • desvenlafaxine succinate (PRISTIQ) 50 MG TABLET SR 24 HR Take 50 mg by mouth every day.     • traZODone (DESYREL) 100 MG Tab Take 100 mg by mouth at bedtime.     • LORazepam (ATIVAN) 1 MG Tab Take 1 mg by mouth every day.     • levonorgestrel (MIRENA) 52 mg (20 mcg/24 hr) IUD 1 Each by Intrauterine route Continuous.     • lactulose 10 GM/15ML Solution Take 30 mL by mouth every day. (Patient not taking: Reported on 5/11/2022) 946 mL 0   • Desvenlafaxine Succinate 100 MG TABLET SR 24 HR Take 1 Tablet by mouth. (Patient not taking: Reported on 5/11/2022)     • traZODone (DESYREL) 50 MG Tab TAKE 2 TABLETS BY MOUTH AT BEDTIME FOR INSOMNIA (Patient not taking: Reported on 5/11/2022)     • Desvenlafaxine  MG TABLET SR 24 HR Take 1 tablet by mouth every day. (Patient not taking: Reported on 5/11/2022) 10 tablet 0     No current facility-administered medications for this visit.       Patient Active Problem List    Diagnosis Date Noted   • Vitamin D deficiency 02/22/2022   • Alcohol dependence (HCC) 11/04/2021   • Primary insomnia 11/02/2021   • Elevated MCV 11/02/2021   • Mixed hyperlipidemia 11/02/2021   • Fatty liver 11/02/2021   • KATTY (generalized anxiety disorder) 05/01/2020   • MDD (major depressive disorder) 08/12/2019   • Chronic RUQ pain 10/10/2018       Family History   Problem Relation Age of Onset   • No Known Problems Mother    • COPD Father    • Heart Failure Maternal Grandfather        She  has a past medical history of Alcohol abuse, Back pain, Depression, Dyslipidemia (11/2/2021), Elevated liver enzymes (11/2/2021), Indigestion, and Seizure ([facility]).    She has no past medical history of Addisons disease (HCC), Adrenal disorder (HCC), Allergy, Anemia, Anginal syndrome (HCC), Arrhythmia, Arthritis, Asthma, At risk for sleep apnea, Blood clotting disorder (HCC), Blood transfusion without reported  "diagnosis, Bronchitis, Cancer (HCC), Cataract, Congestive heart failure (HCC), COPD (chronic obstructive pulmonary disease) (HCC), Cushings syndrome (HCC), Diabetes (HCC), Diabetic neuropathy (HCC), Dialysis patient (HCC), Disorder of thyroid, Fall, GERD (gastroesophageal reflux disease), Glaucoma, Goiter, Gynecological disorder, Head ache, Heart murmur, Heart valve disease, Hemorrhagic disorder (HCC), HIV (human immunodeficiency virus infection) (HCC), Hypertension, IBD (inflammatory bowel disease), Infectious disease, Jaundice, Meningitis, Migraine, Muscle disorder, Myocardial infarct (HCC), Osteoporosis, Pacemaker, Parathyroid disorder (HCC), Pituitary disease (HCC), Pneumonia, Pulmonary emphysema (HCC), Renal disorder, Rheumatic fever, Sickle cell disease (HCC), Stroke (HCC), Tuberculosis, Urinary incontinence, or Urinary tract infection.  She  has a past surgical history that includes primary c section and gyn surgery.       Objective:   Resp 18   Ht 1.753 m (5' 9\") Comment: pt stated  Wt 93 kg (205 lb) Comment: pt stated  BMI 30.27 kg/m²     Physical Exam:  Constitutional: Alert, no distress, well-groomed.  Skin: No rashes in visible areas.  Eye: Round. Conjunctiva clear, lids normal. No icterus.   ENMT: Lips pink without lesions, good dentition, moist mucous membranes. Phonation normal.  Neck: No masses, no thyromegaly. Moves freely without pain.  Respiratory: Unlabored respiratory effort, no cough or audible wheeze  Psych: Alert and oriented x3, normal affect and mood.       Assessment and Plan:   The following treatment plan was discussed:     Breast pain  Acute medical condition.  The patient reports a 3-week history of bilateral nipple tenderness.  She has not noted any skin changes, palpable masses or nipple discharge.  She was scheduled for routine screening mammogram but was told she needed to have a diagnostic mammogram due to the symptoms.  This is a virtual visit so I am unable to examine the " breast.  - MA-DIAGNOSTIC MAMMO BILAT W/TOMOSYNTHESIS W/CAD; Future    Acute maxillary sinusitis, recurrence not specified  TMJ dysfunction  Acute medical condition.  The patient reports a 3-week history of TMJ pain.  Was recently seen at the dentist and told that she could have sinusitis.  We will treat with a Z-Josue.  Patient may need further evaluation for TMJ dysfunction as she is not reporting definitive acute sinusitis symptoms.  - azithromycin (ZITHROMAX) 250 MG Tab; Take two tablets on the first day, then one tablet daily for 4 days.  Dispense: 6 Tablet; Refill: 0      Follow-up: Return in about 3 months (around 8/11/2022) for f/u labs.     Please note that this dictation was created using voice recognition software. I have made every reasonable attempt to correct obvious errors, but I expect that there are errors of grammar and possibly content that I did not discover before finalizing the note.

## 2022-05-23 ENCOUNTER — HOSPITAL ENCOUNTER (OUTPATIENT)
Dept: RADIOLOGY | Facility: MEDICAL CENTER | Age: 42
End: 2022-05-23
Attending: INTERNAL MEDICINE
Payer: COMMERCIAL

## 2022-05-23 DIAGNOSIS — N64.4 BREAST PAIN: ICD-10-CM

## 2022-05-23 PROCEDURE — G0279 TOMOSYNTHESIS, MAMMO: HCPCS

## 2022-05-23 PROCEDURE — 76642 ULTRASOUND BREAST LIMITED: CPT | Mod: RT

## 2022-05-24 ENCOUNTER — APPOINTMENT (OUTPATIENT)
Dept: MEDICAL GROUP | Facility: PHYSICIAN GROUP | Age: 42
End: 2022-05-24
Payer: COMMERCIAL

## 2022-05-24 NOTE — PROGRESS NOTES
Subjective:     CC: No chief complaint on file.        HPI:   Mattie presents today to discuss the following issues:        Past Medical History:   Diagnosis Date   • Alcohol abuse    • Back pain    • Depression    • Dyslipidemia 2021   • Elevated liver enzymes 2021   • Indigestion    • Seizure (HCC)        Social History     Tobacco Use   • Smoking status: Current Every Day Smoker     Packs/day: 0.00     Types: Cigarettes     Last attempt to quit: 2015     Years since quittin.4   • Smokeless tobacco: Never Used   Vaping Use   • Vaping Use: Never used   Substance Use Topics   • Alcohol use: Not Currently     Alcohol/week: 7.2 - 9.6 oz     Types: 6 - 8 Cans of beer, 6 - 8 Shots of liquor per week   • Drug use: No       Current Outpatient Medications Ordered in Epic   Medication Sig Dispense Refill   • azithromycin (ZITHROMAX) 250 MG Tab Take two tablets on the first day, then one tablet daily for 4 days. 6 Tablet 0   • desvenlafaxine succinate (PRISTIQ) 50 MG TABLET SR 24 HR Take 50 mg by mouth every day.     • traZODone (DESYREL) 100 MG Tab Take 100 mg by mouth at bedtime.     • lactulose 10 GM/15ML Solution Take 30 mL by mouth every day. (Patient not taking: Reported on 2022) 946 mL 0   • Desvenlafaxine Succinate 100 MG TABLET SR 24 HR Take 1 Tablet by mouth. (Patient not taking: Reported on 2022)     • LORazepam (ATIVAN) 1 MG Tab Take 1 mg by mouth every day.     • traZODone (DESYREL) 50 MG Tab TAKE 2 TABLETS BY MOUTH AT BEDTIME FOR INSOMNIA (Patient not taking: Reported on 2022)     • Desvenlafaxine  MG TABLET SR 24 HR Take 1 tablet by mouth every day. (Patient not taking: Reported on 2022) 10 tablet 0   • levonorgestrel (MIRENA) 52 mg (20 mcg/24 hr) IUD 1 Each by Intrauterine route Continuous.       No current Epic-ordered facility-administered medications on file.       Allergies:  Prednisone    Health Maintenance: Completed    ROS:   Denies any recent fevers or  chills. No nausea or vomiting. No chest pains or shortness of breath.      Objective:       Exam:  There were no vitals taken for this visit. There is no height or weight on file to calculate BMI.    Gen: Alert and oriented, No apparent distress.  Lungs: Normal effort, CTA bilaterally, no wheezes, rhonchi, or rales  CV: Regular rate and rhythm. No murmurs, rubs, or gallops.  Ext: No clubbing, cyanosis, edema.        Assessment & Plan:     42 y.o. female with the following -     Jaw pain, pain and swelling in throat  The patient was treated with a Z-Josue on 5/11/2022.    Breast pain  Acute medical condition.  The patient reports a 3-week history of bilateral nipple tenderness.  She has not noted any skin changes, palpable masses or nipple discharge.  She was scheduled for routine screening mammogram but was told she needed to have a diagnostic mammogram due to the symptoms.  This is a virtual visit so I am unable to examine the breast.  - MA-DIAGNOSTIC MAMMO BILAT W/TOMOSYNTHESIS W/CAD; Future    Diagnostic bilateral mammogram and ultrasound 5/23/2022 normal.     Acute maxillary sinusitis, recurrence not specified  TMJ dysfunction  Acute medical condition.  The patient reports a 3-week history of TMJ pain.  Was recently seen at the dentist and told that she could have sinusitis.  We will treat with a Z-Josue.  Patient may need further evaluation for TMJ dysfunction as she is not reporting definitive acute sinusitis symptoms.  - azithromycin (ZITHROMAX) 250 MG Tab; Take two tablets on the first day, then one tablet daily for 4 days.  Dispense: 6 Tablet; Refill: 0    Alcohol dependence with unspecified alcohol-induced disorder (HCC)  This is a chronic condition.  Recurrent. The patient reports a longstanding history of recurrent episodes of excessive alcohol consumption.  She began to drink heavily during the COVID-19 pandemic.  She was drinking 5-6 nights weekly, typically a large [employer] sized bottle of vodka in a week.   She went into a residential treatment program for 1 month in May in [place].  When she was discharged she was given an injection of naltrexone.  She did well for a few months and then relapsed in September.  She is currently drinking 5 nights weekly, 2-3 shots of hard alcohol and a couple of beers per night.  She does have an appointment with a psychiatrist and hopes to be put back on naltrexone.      Right elbow pain  This is a chronic condition.  Stable.  The patient reports a 1 year history of intermittent right elbow pain.  The pain is located localized over the olecranon.  It does not radiate.  There is no associated hand weakness, or arm numbness or tingling.  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  -The patient was given a handout of elbow exercises  - DX-ELBOW-COMPLETE 3+ RIGHT; Future     KATTY (generalized anxiety disorder)  Mild episode of recurrent major depressive disorder (HCC)  Chronic medical condition.      Ativan as needed 1 mg, 30 day supply lasts 2 to 3 months  - Desvenlafaxine Succinate 100 MG TABLET SR 24 HR; Take 1 Tablet by mouth.     Primary insomnia  This is a chronic condition.  Well-controlled.    -Continue trazodone 50 mg nightly      Fatty liver  Elevated liver enzymes  This is an acute condition.  The patient reports stable right upper quadrant pain for many years. Labs from 12/28/2021 showed a normal AST and ALT of 31 and 28, down from 62 and 62.  Right upper quadrant ultrasound on 4/20/2021 showed hepatomegaly and hepatic steatosis.  - Comp Metabolic Panel; Future  -Referral for SCHAEFER testing    Elevated MCV  Chronic medical condition.  Stable.    Labs from 12/20/2021 showed an elevated MCV of 101.2.  Hemoglobin and hematocrit were normal at 13.8 and 41.6.  B12 level was normal at 303 and folic acid level was normal at 12.6.       Hemoglobin and hematocrit were normal at 14.4 and 42.3.  The patient does report heavy alcohol consumption.  - VITAMIN  B12; Future  - FOLATE; Future  - CBC WITHOUT DIFFERENTIAL; Future     Mixed hyperlipidemia  Chronic medical condition.  Stable.  Lipid panel from 12/20/2021 showed a total cholesterol 208, , HDL 70, triglycerides 137. The 10-year ASCVD risk score (Beckwourth VANESSA Jr., et al., 2013) is: 1.4%  -Continue dietary management with a low-cholesterol diet given the patient's low 10-year ASCVD risk score  - Comp Metabolic Panel; Future  - Lipid Profile; Future     Peanut allergy  This is an acute condition.  The patient is requesting a referral to allergy and immunology for peanut allergy testing.  She stated she had a remote episode of peanut consumption in which her throat felt like it was swelling.  She did not have any rash, itching, shortness of breath, or anaphylactic reaction.  - Referral to Allergy     Vitamin D deficiency  Vitamin D level from 10/20/2021 was reduced at 22.     Encounter for screening for diabetes mellitus  - HEMOGLOBIN A1C; Future     Thyroid disorder screen  - TSH WITH REFLEX TO FT4; Future           Follow-up: Return in about 3 months (around 8/11/2022) for f/u labs.    I spent a total of *** minutes with record review, exam, communication with the patient, communication with other providers, and documentation of this encounter.      No follow-ups on file.    Please note that this dictation was created using voice recognition software. I have made every reasonable attempt to correct obvious errors, but I expect that there are errors of grammar and possibly content that I did not discover before finalizing the note.

## 2022-06-12 NOTE — PROGRESS NOTES
Subjective:     CC:   Chief Complaint   Patient presents with   • Follow-Up         HPI:   Mattie presents today to discuss the following issues:    The patient states she saw an ENT for her jaw pain last week and they placed a referral for further evaluation.  The patient states she has been successfully cutting down on her alcohol consumption.  She is only drinking 2 days/week.  She does drink an excessive amount of alcohol on those 2 days, consisting of 6 or 7 shots and a couple of beers.      Past Medical History:   Diagnosis Date   • Alcohol abuse    • Back pain    • Depression    • Dyslipidemia 2021   • Elevated liver enzymes 2021   • Indigestion    • Seizure (HCC)        Social History     Tobacco Use   • Smoking status: Current Every Day Smoker     Packs/day: 0.00     Types: Cigarettes     Last attempt to quit: 2015     Years since quittin.4   • Smokeless tobacco: Never Used   Vaping Use   • Vaping Use: Never used   Substance Use Topics   • Alcohol use: Not Currently     Alcohol/week: 7.2 - 9.6 oz     Types: 6 - 8 Cans of beer, 6 - 8 Shots of liquor per week     Comment: occ   • Drug use: No       Current Outpatient Medications Ordered in Epic   Medication Sig Dispense Refill   • desvenlafaxine succinate (PRISTIQ) 50 MG TABLET SR 24 HR Take 50 mg by mouth every day.     • traZODone (DESYREL) 100 MG Tab Take 100 mg by mouth at bedtime.     • LORazepam (ATIVAN) 1 MG Tab Take 1 mg by mouth every day.     • Desvenlafaxine  MG TABLET SR 24 HR Take 1 tablet by mouth every day. 10 tablet 0   • levonorgestrel (MIRENA) 52 mg (20 mcg/24 hr) IUD 1 Each by Intrauterine route Continuous.     • azithromycin (ZITHROMAX) 250 MG Tab Take two tablets on the first day, then one tablet daily for 4 days. 6 Tablet 0     No current Epic-ordered facility-administered medications on file.       Allergies:  Prednisone    Health Maintenance: Completed    ROS:   Denies any recent fevers or chills. No nausea or  "vomiting. No chest pains or shortness of breath.      Objective:       Exam:  /72 (BP Location: Right arm, Patient Position: Sitting, BP Cuff Size: Adult)   Pulse 95   Temp 36.8 °C (98.2 °F) (Temporal)   Resp 18   Ht 1.753 m (5' 9\")   Wt 96.4 kg (212 lb 9.6 oz)   SpO2 96%   BMI 31.40 kg/m²  Body mass index is 31.4 kg/m².    Gen: Alert and oriented, No apparent distress.  Lungs: Normal effort, CTA bilaterally, no wheezes, rhonchi, or rales  CV: Regular rate and rhythm. No murmurs, rubs, or gallops.  Ext: No clubbing, cyanosis, edema.      Assessment & Plan:     42 y.o. female with the following -     Alcohol dependence with unspecified alcohol-induced disorder (HCC)  Chronic medical condition.    The patient is currently drinking 2 days/week, each episode consisting of 6 or 7 shots of alcohol in a couple of years.    KATTY (generalized anxiety disorder)  Mild episode of recurrent major depressive disorder (HCC)  Chronic medical conditions.  The patient is on desvenlafaxine 100 mg daily and lorazepam 1 mg as needed, a 30-day supply will last 2 to 3 months.  Review of the patient's  shows that she was last given a prescription for lorazepam 1 mg, dispense #30, on 5/11/2022. Obtained and reviewed patient utilization report from [facility] pharmacy database on 6/14/2022 6:25 AM  prior to writing prescription for controlled substance II, III or IV per [place] bill . Based on assessment of the report, the prescription is medically necessary.   -Continue desvenlafaxine 50 mg daily  -Continue lorazepam 1 mg daily as needed    Primary insomnia  Chronic medical condition.  The patient reports her symptoms are well controlled on trazodone 100 mg nightly.  -Continue trazodone 100 mg nightly      Fatty liver  Elevated liver enzymes  Chronic medical condition. Right upper quadrant ultrasound on 4/20/2021 showed hepatomegaly and hepatic steatosis.  Most recent liver enzymes from 12/28/2021 were normal at 31 and " 28.  - Comp Metabolic Panel; Future  - GAMMA GT (GGT); Future     Elevated MCV  Chronic medical condition.    MCV on 12/28/2021 was elevated at 101.2.  Hemoglobin and hematocrit were normal.   Folic acid level was normal at 12.6 and B12 was normal at 303.  The patient does report heavy alcohol consumption.  - CBC WITHOUT DIFFERENTIAL; Future     Mixed hyperlipidemia  Chronic medical condition.    Diet controlled.  Lipid panel from 12/20/2021 showed a total cholesterol 208, , HDL 70, triglycerides 137.  -Continue dietary management with a low-cholesterol diet given the patient's low 10-year ASCVD risk score  - Comp Metabolic Panel; Future  - Lipid Profile; Future     Thyroid disorder screen  - FREE THYROXINE; Future  - T3 FREE; Future  - TSH; Future      Return in about 6 months (around 12/14/2022) for f/u labs.    Please note that this dictation was created using voice recognition software. I have made every reasonable attempt to correct obvious errors, but I expect that there are errors of grammar and possibly content that I did not discover before finalizing the note.

## 2022-06-14 ENCOUNTER — OFFICE VISIT (OUTPATIENT)
Dept: MEDICAL GROUP | Facility: PHYSICIAN GROUP | Age: 42
End: 2022-06-14
Payer: COMMERCIAL

## 2022-06-14 VITALS
BODY MASS INDEX: 31.49 KG/M2 | OXYGEN SATURATION: 96 % | WEIGHT: 212.6 LBS | SYSTOLIC BLOOD PRESSURE: 122 MMHG | HEIGHT: 69 IN | HEART RATE: 95 BPM | RESPIRATION RATE: 18 BRPM | TEMPERATURE: 98.2 F | DIASTOLIC BLOOD PRESSURE: 72 MMHG

## 2022-06-14 DIAGNOSIS — Z13.0 SCREENING FOR DEFICIENCY ANEMIA: ICD-10-CM

## 2022-06-14 DIAGNOSIS — Z13.1 ENCOUNTER FOR SCREENING FOR DIABETES MELLITUS: ICD-10-CM

## 2022-06-14 DIAGNOSIS — F41.1 GAD (GENERALIZED ANXIETY DISORDER): ICD-10-CM

## 2022-06-14 DIAGNOSIS — F10.29 ALCOHOL DEPENDENCE WITH UNSPECIFIED ALCOHOL-INDUCED DISORDER (HCC): ICD-10-CM

## 2022-06-14 DIAGNOSIS — K76.0 FATTY LIVER: ICD-10-CM

## 2022-06-14 DIAGNOSIS — F51.01 PRIMARY INSOMNIA: ICD-10-CM

## 2022-06-14 DIAGNOSIS — R71.8 ELEVATED MCV: ICD-10-CM

## 2022-06-14 DIAGNOSIS — F33.0 MILD EPISODE OF RECURRENT MAJOR DEPRESSIVE DISORDER (HCC): ICD-10-CM

## 2022-06-14 DIAGNOSIS — Z13.6 SCREENING FOR CARDIOVASCULAR CONDITION: ICD-10-CM

## 2022-06-14 DIAGNOSIS — E78.2 MIXED HYPERLIPIDEMIA: ICD-10-CM

## 2022-06-14 DIAGNOSIS — R74.8 ELEVATED LIVER ENZYMES: ICD-10-CM

## 2022-06-14 DIAGNOSIS — Z13.29 THYROID DISORDER SCREEN: ICD-10-CM

## 2022-06-14 PROBLEM — R10.11 CHRONIC RUQ PAIN: Status: RESOLVED | Noted: 2018-10-10 | Resolved: 2022-06-14

## 2022-06-14 PROBLEM — G89.29 CHRONIC RUQ PAIN: Status: RESOLVED | Noted: 2018-10-10 | Resolved: 2022-06-14

## 2022-06-14 PROBLEM — E55.9 VITAMIN D DEFICIENCY: Status: RESOLVED | Noted: 2022-02-22 | Resolved: 2022-06-14

## 2022-06-14 PROCEDURE — 99214 OFFICE O/P EST MOD 30 MIN: CPT | Performed by: INTERNAL MEDICINE

## 2022-06-14 ASSESSMENT — FIBROSIS 4 INDEX: FIB4 SCORE: 1.09

## 2022-07-22 ENCOUNTER — OFFICE VISIT (OUTPATIENT)
Dept: URGENT CARE | Facility: PHYSICIAN GROUP | Age: 42
End: 2022-07-22
Payer: COMMERCIAL

## 2022-07-22 VITALS
HEIGHT: 69 IN | TEMPERATURE: 98 F | WEIGHT: 212 LBS | DIASTOLIC BLOOD PRESSURE: 86 MMHG | OXYGEN SATURATION: 95 % | SYSTOLIC BLOOD PRESSURE: 132 MMHG | BODY MASS INDEX: 31.4 KG/M2 | RESPIRATION RATE: 16 BRPM | HEART RATE: 81 BPM

## 2022-07-22 DIAGNOSIS — M26.609 TMJ DYSFUNCTION: ICD-10-CM

## 2022-07-22 PROCEDURE — 99213 OFFICE O/P EST LOW 20 MIN: CPT | Performed by: PHYSICIAN ASSISTANT

## 2022-07-22 RX ORDER — CYCLOBENZAPRINE HCL 5 MG
5-10 TABLET ORAL
Qty: 30 TABLET | Refills: 0 | Status: SHIPPED | OUTPATIENT
Start: 2022-07-22 | End: 2023-12-29

## 2022-07-22 ASSESSMENT — ENCOUNTER SYMPTOMS
ABDOMINAL PAIN: 0
COUGH: 0
HEADACHES: 0
CONSTIPATION: 0
VOMITING: 0
DIARRHEA: 0
MYALGIAS: 0
NAUSEA: 0
CHILLS: 0
EYE PAIN: 0
SHORTNESS OF BREATH: 0
SORE THROAT: 0
FEVER: 0

## 2022-07-22 ASSESSMENT — FIBROSIS 4 INDEX: FIB4 SCORE: 1.09

## 2022-07-22 NOTE — PROGRESS NOTES
"Subjective:   Mattie Hicks is a 42 y.o. female who presents for Other (Pt thinks she has tmj), Jaw Pain, and Facial Pain      This is a 42-year-old female who has been suffering from bilateral jaw pain over the last 2 or 3 months.  She has seen her dentist who recommended use of a tooth guard for grinding of the teeth, she has seen an ENT who also suspected TMJ but referred her to a specialist.  Specialist could potentially be oral maxillofacial surgery however there is an issue with insurance and she has not yet had that follow-up over concerns of cost and paying out-of-pocket.  She has been trying some exercises that she found on the Internet as well as taking ibuprofen which does not seem to help significantly.  Pain is significantly worse upon awakening in the morning and then also seems to occur at the end of her day.  She does note an increase in stress recently.      Review of Systems   Constitutional: Negative for chills and fever.   HENT: Positive for ear pain. Negative for congestion and sore throat.    Eyes: Negative for pain.   Respiratory: Negative for cough and shortness of breath.    Cardiovascular: Negative for chest pain.   Gastrointestinal: Negative for abdominal pain, constipation, diarrhea, nausea and vomiting.   Genitourinary: Negative for dysuria.   Musculoskeletal: Negative for myalgias.   Skin: Negative for rash.   Neurological: Negative for headaches.       Medications, Allergies, and current problem list reviewed today in Epic.     Objective:     /86 (BP Location: Left arm, Patient Position: Sitting, BP Cuff Size: Adult)   Pulse 81   Temp 36.7 °C (98 °F) (Temporal)   Resp 16   Ht 1.753 m (5' 9\")   Wt 96.2 kg (212 lb)   SpO2 95%     Physical Exam  Vitals reviewed.   Constitutional:       Appearance: Normal appearance.   HENT:      Head: Normocephalic and atraumatic.      Right Ear: Tympanic membrane, ear canal and external ear normal.      Left Ear: Tympanic membrane, ear " canal and external ear normal.      Nose: Nose normal.      Mouth/Throat:      Mouth: Mucous membranes are moist.      Comments: Bilateral TMJ tenderness with mild crepitance, limited range of motion of the mandible, dentition changes consistent with bruxism.  Patent airway otherwise.  Eyes:      Conjunctiva/sclera: Conjunctivae normal.   Cardiovascular:      Rate and Rhythm: Normal rate and regular rhythm.   Pulmonary:      Effort: Pulmonary effort is normal.      Breath sounds: Normal breath sounds.   Lymphadenopathy:      Cervical: No cervical adenopathy.   Skin:     General: Skin is warm and dry.      Capillary Refill: Capillary refill takes less than 2 seconds.   Neurological:      Mental Status: She is alert and oriented to person, place, and time.         Assessment/Plan:     Diagnosis and associated orders:     1. TMJ dysfunction  cyclobenzaprine (FLEXERIL) 5 mg tablet      Comments/MDM:     • Exercises (isometric) reviewed  • nsaids reviewed  • Flexeril at night, discussed sedation. Do not use benzo while taking  • Jaw relaxation  • Stress reduction  • Consider referral to physical therapy thru primary  • Consider specialist consult if not improvement with non-surgical/injection interventions over 3 months         Differential diagnosis, natural history, supportive care, and indications for immediate follow-up discussed.    Advised the patient to follow-up with the primary care physician for recheck, reevaluation, and consideration of further management.    Please note that this dictation was created using voice recognition software. I have made a reasonable attempt to correct obvious errors, but I expect that there are errors of grammar and possibly content that I did not discover before finalizing the note.    This note was electronically signed by Franklyn Mckeon PA-C

## 2022-08-30 ENCOUNTER — HOSPITAL ENCOUNTER (OUTPATIENT)
Dept: LAB | Facility: MEDICAL CENTER | Age: 42
End: 2022-08-30
Attending: INTERNAL MEDICINE
Payer: COMMERCIAL

## 2022-08-30 DIAGNOSIS — Z13.6 SCREENING FOR CARDIOVASCULAR CONDITION: ICD-10-CM

## 2022-08-30 DIAGNOSIS — Z13.29 THYROID DISORDER SCREEN: ICD-10-CM

## 2022-08-30 DIAGNOSIS — Z13.0 SCREENING FOR DEFICIENCY ANEMIA: ICD-10-CM

## 2022-08-30 DIAGNOSIS — Z13.1 ENCOUNTER FOR SCREENING FOR DIABETES MELLITUS: ICD-10-CM

## 2022-08-30 DIAGNOSIS — R74.8 ELEVATED LIVER ENZYMES: ICD-10-CM

## 2022-08-30 LAB
ALBUMIN SERPL BCP-MCNC: 4.4 G/DL (ref 3.2–4.9)
ALBUMIN/GLOB SERPL: 1.3 G/DL
ALP SERPL-CCNC: 76 U/L (ref 30–99)
ALT SERPL-CCNC: 45 U/L (ref 2–50)
ANION GAP SERPL CALC-SCNC: 12 MMOL/L (ref 7–16)
AST SERPL-CCNC: 35 U/L (ref 12–45)
BASOPHILS # BLD AUTO: 0.5 % (ref 0–1.8)
BASOPHILS # BLD: 0.03 K/UL (ref 0–0.12)
BILIRUB SERPL-MCNC: 0.3 MG/DL (ref 0.1–1.5)
BUN SERPL-MCNC: 15 MG/DL (ref 8–22)
CALCIUM SERPL-MCNC: 9.4 MG/DL (ref 8.5–10.5)
CHLORIDE SERPL-SCNC: 101 MMOL/L (ref 96–112)
CHOLEST SERPL-MCNC: 213 MG/DL (ref 100–199)
CO2 SERPL-SCNC: 23 MMOL/L (ref 20–33)
CREAT SERPL-MCNC: 0.64 MG/DL (ref 0.5–1.4)
EOSINOPHIL # BLD AUTO: 0.13 K/UL (ref 0–0.51)
EOSINOPHIL NFR BLD: 2.2 % (ref 0–6.9)
ERYTHROCYTE [DISTWIDTH] IN BLOOD BY AUTOMATED COUNT: 46.6 FL (ref 35.9–50)
FASTING STATUS PATIENT QL REPORTED: NORMAL
GFR SERPLBLD CREATININE-BSD FMLA CKD-EPI: 112 ML/MIN/1.73 M 2
GGT SERPL-CCNC: 35 U/L (ref 7–34)
GLOBULIN SER CALC-MCNC: 3.3 G/DL (ref 1.9–3.5)
GLUCOSE SERPL-MCNC: 77 MG/DL (ref 65–99)
HCT VFR BLD AUTO: 41.6 % (ref 37–47)
HDLC SERPL-MCNC: 61 MG/DL
HGB BLD-MCNC: 13.8 G/DL (ref 12–16)
IMM GRANULOCYTES # BLD AUTO: 0 K/UL (ref 0–0.11)
IMM GRANULOCYTES NFR BLD AUTO: 0 % (ref 0–0.9)
LDLC SERPL CALC-MCNC: 117 MG/DL
LYMPHOCYTES # BLD AUTO: 2.39 K/UL (ref 1–4.8)
LYMPHOCYTES NFR BLD: 41.3 % (ref 22–41)
MCH RBC QN AUTO: 33.3 PG (ref 27–33)
MCHC RBC AUTO-ENTMCNC: 33.2 G/DL (ref 33.6–35)
MCV RBC AUTO: 100.2 FL (ref 81.4–97.8)
MONOCYTES # BLD AUTO: 0.61 K/UL (ref 0–0.85)
MONOCYTES NFR BLD AUTO: 10.5 % (ref 0–13.4)
NEUTROPHILS # BLD AUTO: 2.63 K/UL (ref 2–7.15)
NEUTROPHILS NFR BLD: 45.5 % (ref 44–72)
NRBC # BLD AUTO: 0 K/UL
NRBC BLD-RTO: 0 /100 WBC
PLATELET # BLD AUTO: 257 K/UL (ref 164–446)
PMV BLD AUTO: 9.9 FL (ref 9–12.9)
POTASSIUM SERPL-SCNC: 4.1 MMOL/L (ref 3.6–5.5)
PROT SERPL-MCNC: 7.7 G/DL (ref 6–8.2)
RBC # BLD AUTO: 4.15 M/UL (ref 4.2–5.4)
SODIUM SERPL-SCNC: 136 MMOL/L (ref 135–145)
T3FREE SERPL-MCNC: 2.77 PG/ML (ref 2–4.4)
T4 FREE SERPL-MCNC: 1.03 NG/DL (ref 0.93–1.7)
TRIGL SERPL-MCNC: 173 MG/DL (ref 0–149)
TSH SERPL DL<=0.005 MIU/L-ACNC: 1.84 UIU/ML (ref 0.38–5.33)
WBC # BLD AUTO: 5.8 K/UL (ref 4.8–10.8)

## 2022-08-30 PROCEDURE — 84443 ASSAY THYROID STIM HORMONE: CPT

## 2022-08-30 PROCEDURE — 82977 ASSAY OF GGT: CPT

## 2022-08-30 PROCEDURE — 84439 ASSAY OF FREE THYROXINE: CPT

## 2022-08-30 PROCEDURE — 36415 COLL VENOUS BLD VENIPUNCTURE: CPT

## 2022-08-30 PROCEDURE — 84481 FREE ASSAY (FT-3): CPT

## 2022-08-30 PROCEDURE — 85025 COMPLETE CBC W/AUTO DIFF WBC: CPT

## 2022-08-30 PROCEDURE — 80061 LIPID PANEL: CPT

## 2022-08-30 PROCEDURE — 80053 COMPREHEN METABOLIC PANEL: CPT

## 2022-09-12 NOTE — PROGRESS NOTES
Subjective:     CC:   Chief Complaint   Patient presents with    Lab Results         HPI:   Mattie presents today for follow-up visit to review recent lab results.  She also reports that over the previous 5 months she has been dealing with jaw pain, that was thought to likely be TMJ dysfunction.  She states she saw a dentist and an ENT and was told it was not TMJ.  She has taken a muscle relaxant for the jaw pain, but it was not helpful. She now reports that in addition to the jaw tightness, she has been having a 2-month history of sore tongue and cheeks.  She states it feels like her cheeks and tongue are raw.  She states it feels like she may have mouth sores, but she has not visualized any sores.  She also reports the sensation of throat tightness.  She denies dysphagia or odynophagia.  She has not had any drooling or difficulty talking.    Past Medical History:   Diagnosis Date    Alcohol abuse     Back pain     Chronic RUQ pain 10/10/2018    Depression     Dyslipidemia 2021    Elevated liver enzymes 2021    Indigestion     Seizure (HCC)     Vitamin D deficiency 2022       Social History     Tobacco Use    Smoking status: Every Day     Packs/day: 0.00     Types: Cigarettes     Last attempt to quit: 2015     Years since quittin.7    Smokeless tobacco: Never   Vaping Use    Vaping Use: Never used   Substance Use Topics    Alcohol use: Yes     Alcohol/week: 7.2 - 9.6 oz     Types: 6 - 8 Cans of beer, 6 - 8 Shots of liquor per week     Comment: occ    Drug use: No       Current Outpatient Medications Ordered in Epic   Medication Sig Dispense Refill    cyclobenzaprine (FLEXERIL) 5 mg tablet Take 1-2 Tablets by mouth at bedtime as needed for Muscle Spasms. 30 Tablet 0    desvenlafaxine succinate (PRISTIQ) 50 MG TABLET SR 24 HR Take 50 mg by mouth every day.      traZODone (DESYREL) 100 MG Tab Take 100 mg by mouth at bedtime.      LORazepam (ATIVAN) 1 MG Tab Take 1 mg by mouth every day.       "levonorgestrel (MIRENA) 52 mg (20 mcg/24 hr) IUD 1 Each by Intrauterine route Continuous.       No current Epic-ordered facility-administered medications on file.       Allergies:  Prednisone    Health Maintenance: Completed    Review of Systems:  No fevers or chills. No cough, chest pain, or shortness of breath.       Objective:       Exam:  /80 (BP Location: Right arm, Patient Position: Sitting, BP Cuff Size: Adult)   Pulse 95   Temp 36.8 °C (98.2 °F) (Temporal)   Resp 18   Ht 1.753 m (5' 9\")   Wt 98.9 kg (218 lb)   SpO2 97%   BMI 32.19 kg/m²  Body mass index is 32.19 kg/m².    Gen: Alert and oriented, No apparent distress.  ENT: Oropharynx without erythema, swelling, or mouth ulcers  Neck: No palpable nodules  Lungs: Normal effort, CTA bilaterally, no wheezes, rhonchi, or rales  CV: Regular rate and rhythm. No murmurs, rubs, or gallops.  Ext: No clubbing, cyanosis, edema.        Assessment & Plan:     42 y.o. female with the following -     Sore mouth  Neck swelling  Acute medical condition.  Progressive.  Over the previous 5 months the patient has been dealing with jaw pain, that was thought to likely be TMJ dysfunction.  She states she saw a dentist and an ENT and was told it was not TMJ.  She has taken a muscle relaxant for the jaw pain, but it was not helpful.  She now reports that in addition to the jaw tightness, she has been having a 2-month history of sore tongue and cheeks.  She states it feels like her cheeks and tongue are raw.  She states it feels like she may have mouth sores, but she has not visualized any sores.    She also reports the sensation of throat tightness.  She denies dysphagia or odynophagia.  She has not had any drooling or difficulty talking.  Discussed possible etiologies of dry mouth syndrome and burning mouth syndrome.  Will check autoimmune and inflammatory labs and obtain an ultrasound of the neck.  - CAROLINA ANTIBODY WITH REFLEX; Future  - RHEUMATOID ARTHRITIS FACTOR; " Future  - CCP ANTIBODY; Future  - CRP QUANTITIVE (NON-CARDIAC); Future  - Sed Rate; Future  - US-SOFT TISSUES OF HEAD - NECK; Future    KATTY (generalized anxiety disorder)  Mild episode of recurrent major depressive disorder (HCC)  Chronic medical conditions.  Well-controlled.  The patient is on desvenlafaxine 100 mg daily and lorazepam 1 mg as needed, a 30-day supply will last 2 to 3 months.    Review of the patient's  shows that she was last given a prescription for lorazepam 1 mg, dispense #30, on 7/26/2022.   Obtained and reviewed patient utilization report from [facility] pharmacy database on 9/13/2022 11:02 AM  prior to writing prescription for controlled substance II, III or IV per [place] bill . Based on assessment of the report, the prescription is medically necessary.   Continue current regimen of desvenlafaxine 50 mg daily and lorazepam 1 mg daily as needed.    Primary insomnia  Chronic medical condition.  Well-controlled.  Continue current regimen of trazodone 100 mg nightly.       Fatty liver  Chronic medical condition.  Stable.    Right upper quadrant ultrasound on 4/20/2021 showed hepatomegaly and hepatic steatosis.    Liver enzymes from 8/30/2022 were normal with an AST of 35 and an ALT of 45.  GGT was slightly elevated at 35.    Elevated MCV  Chronic medical condition.    Improved.    MCV from 8/30/2022 was elevated at 100.2, down from a value of 101.2.  Hemoglobin and hematocrit are normal.    MCV on 12/28/2021 was elevated at 101.2.  Hemoglobin and hematocrit were normal.   Folic acid level was normal at 12.6 and B12 was normal at 303.       Mixed hyperlipidemia  Chronic medical condition.    Stable.    Diet controlled.  Lipid panel from 8/30/2022 showed a total cholesterol 213, , HDL 61, triglycerides 173.  The 10-year ASCVD risk score (Cesar VANESSA Jr., et al., 2013) is: 2.4%  Continue dietary management for low-cholesterol diet given the patient's low 10 ASCVD risk score.       Return  in about 6 months (around 3/13/2023) for 6-month f/u visit.    Please note that this dictation was created using voice recognition software. I have made every reasonable attempt to correct obvious errors, but I expect that there are errors of grammar and possibly content that I did not discover before finalizing the note.

## 2022-09-13 ENCOUNTER — OFFICE VISIT (OUTPATIENT)
Dept: MEDICAL GROUP | Facility: PHYSICIAN GROUP | Age: 42
End: 2022-09-13
Payer: COMMERCIAL

## 2022-09-13 ENCOUNTER — HOSPITAL ENCOUNTER (OUTPATIENT)
Dept: LAB | Facility: MEDICAL CENTER | Age: 42
End: 2022-09-13
Attending: INTERNAL MEDICINE
Payer: COMMERCIAL

## 2022-09-13 VITALS
WEIGHT: 218 LBS | SYSTOLIC BLOOD PRESSURE: 128 MMHG | HEART RATE: 95 BPM | HEIGHT: 69 IN | BODY MASS INDEX: 32.29 KG/M2 | OXYGEN SATURATION: 97 % | RESPIRATION RATE: 18 BRPM | TEMPERATURE: 98.2 F | DIASTOLIC BLOOD PRESSURE: 80 MMHG

## 2022-09-13 DIAGNOSIS — F51.01 PRIMARY INSOMNIA: ICD-10-CM

## 2022-09-13 DIAGNOSIS — K76.0 FATTY LIVER: ICD-10-CM

## 2022-09-13 DIAGNOSIS — F33.0 MILD EPISODE OF RECURRENT MAJOR DEPRESSIVE DISORDER (HCC): ICD-10-CM

## 2022-09-13 DIAGNOSIS — R71.8 ELEVATED MCV: ICD-10-CM

## 2022-09-13 DIAGNOSIS — K13.79 SORE MOUTH: ICD-10-CM

## 2022-09-13 DIAGNOSIS — R22.1 NECK SWELLING: ICD-10-CM

## 2022-09-13 DIAGNOSIS — E78.2 MIXED HYPERLIPIDEMIA: ICD-10-CM

## 2022-09-13 DIAGNOSIS — K12.1 MOUTH ULCERS: ICD-10-CM

## 2022-09-13 DIAGNOSIS — F41.1 GAD (GENERALIZED ANXIETY DISORDER): ICD-10-CM

## 2022-09-13 LAB
CRP SERPL HS-MCNC: <0.3 MG/DL (ref 0–0.75)
ERYTHROCYTE [SEDIMENTATION RATE] IN BLOOD BY WESTERGREN METHOD: 17 MM/HOUR (ref 0–25)
RHEUMATOID FACT SER IA-ACNC: <10 IU/ML (ref 0–14)

## 2022-09-13 PROCEDURE — 99214 OFFICE O/P EST MOD 30 MIN: CPT | Performed by: INTERNAL MEDICINE

## 2022-09-13 PROCEDURE — 85652 RBC SED RATE AUTOMATED: CPT

## 2022-09-13 PROCEDURE — 86038 ANTINUCLEAR ANTIBODIES: CPT

## 2022-09-13 PROCEDURE — 86431 RHEUMATOID FACTOR QUANT: CPT

## 2022-09-13 PROCEDURE — 86140 C-REACTIVE PROTEIN: CPT

## 2022-09-13 PROCEDURE — 36415 COLL VENOUS BLD VENIPUNCTURE: CPT

## 2022-09-13 PROCEDURE — 86200 CCP ANTIBODY: CPT

## 2022-09-13 ASSESSMENT — FIBROSIS 4 INDEX: FIB4 SCORE: 0.87

## 2022-09-15 LAB
CCP IGG SERPL-ACNC: 3 UNITS (ref 0–19)
NUCLEAR IGG SER QL IA: NORMAL

## 2023-01-30 ENCOUNTER — HOSPITAL ENCOUNTER (OUTPATIENT)
Dept: LAB | Facility: MEDICAL CENTER | Age: 43
End: 2023-01-30
Attending: NURSE PRACTITIONER
Payer: COMMERCIAL

## 2023-01-30 LAB
BASOPHILS # BLD AUTO: 0.5 % (ref 0–1.8)
BASOPHILS # BLD: 0.04 K/UL (ref 0–0.12)
EOSINOPHIL # BLD AUTO: 0.11 K/UL (ref 0–0.51)
EOSINOPHIL NFR BLD: 1.5 % (ref 0–6.9)
ERYTHROCYTE [DISTWIDTH] IN BLOOD BY AUTOMATED COUNT: 45.6 FL (ref 35.9–50)
HCT VFR BLD AUTO: 45.5 % (ref 37–47)
HGB BLD-MCNC: 14.8 G/DL (ref 12–16)
IMM GRANULOCYTES # BLD AUTO: 0.02 K/UL (ref 0–0.11)
IMM GRANULOCYTES NFR BLD AUTO: 0.3 % (ref 0–0.9)
LYMPHOCYTES # BLD AUTO: 2.84 K/UL (ref 1–4.8)
LYMPHOCYTES NFR BLD: 37.9 % (ref 22–41)
MCH RBC QN AUTO: 33.2 PG (ref 27–33)
MCHC RBC AUTO-ENTMCNC: 32.5 G/DL (ref 33.6–35)
MCV RBC AUTO: 102 FL (ref 81.4–97.8)
MONOCYTES # BLD AUTO: 0.42 K/UL (ref 0–0.85)
MONOCYTES NFR BLD AUTO: 5.6 % (ref 0–13.4)
NEUTROPHILS # BLD AUTO: 4.07 K/UL (ref 2–7.15)
NEUTROPHILS NFR BLD: 54.2 % (ref 44–72)
NRBC # BLD AUTO: 0 K/UL
NRBC BLD-RTO: 0 /100 WBC
PLATELET # BLD AUTO: 272 K/UL (ref 164–446)
PMV BLD AUTO: 10.5 FL (ref 9–12.9)
RBC # BLD AUTO: 4.46 M/UL (ref 4.2–5.4)
WBC # BLD AUTO: 7.5 K/UL (ref 4.8–10.8)

## 2023-01-30 PROCEDURE — 36415 COLL VENOUS BLD VENIPUNCTURE: CPT

## 2023-01-30 PROCEDURE — 83002 ASSAY OF GONADOTROPIN (LH): CPT

## 2023-01-30 PROCEDURE — 85025 COMPLETE CBC W/AUTO DIFF WBC: CPT

## 2023-01-30 PROCEDURE — 83036 HEMOGLOBIN GLYCOSYLATED A1C: CPT

## 2023-01-30 PROCEDURE — 84305 ASSAY OF SOMATOMEDIN: CPT

## 2023-01-30 PROCEDURE — 84481 FREE ASSAY (FT-3): CPT

## 2023-01-30 PROCEDURE — 84144 ASSAY OF PROGESTERONE: CPT

## 2023-01-30 PROCEDURE — 84439 ASSAY OF FREE THYROXINE: CPT

## 2023-01-30 PROCEDURE — 86800 THYROGLOBULIN ANTIBODY: CPT

## 2023-01-30 PROCEDURE — 84270 ASSAY OF SEX HORMONE GLOBUL: CPT

## 2023-01-30 PROCEDURE — 82670 ASSAY OF TOTAL ESTRADIOL: CPT

## 2023-01-30 PROCEDURE — 84443 ASSAY THYROID STIM HORMONE: CPT

## 2023-01-30 PROCEDURE — 80053 COMPREHEN METABOLIC PANEL: CPT

## 2023-01-30 PROCEDURE — 83001 ASSAY OF GONADOTROPIN (FSH): CPT

## 2023-01-30 PROCEDURE — 84402 ASSAY OF FREE TESTOSTERONE: CPT

## 2023-01-30 PROCEDURE — 84403 ASSAY OF TOTAL TESTOSTERONE: CPT

## 2023-01-30 PROCEDURE — 80061 LIPID PANEL: CPT

## 2023-01-30 PROCEDURE — 82306 VITAMIN D 25 HYDROXY: CPT

## 2023-01-31 LAB
25(OH)D3 SERPL-MCNC: 17 NG/ML (ref 30–100)
ALBUMIN SERPL BCP-MCNC: 4.6 G/DL (ref 3.2–4.9)
ALBUMIN/GLOB SERPL: 1.2 G/DL
ALP SERPL-CCNC: 74 U/L (ref 30–99)
ALT SERPL-CCNC: 101 U/L (ref 2–50)
ANION GAP SERPL CALC-SCNC: 12 MMOL/L (ref 7–16)
AST SERPL-CCNC: 69 U/L (ref 12–45)
BILIRUB SERPL-MCNC: 0.5 MG/DL (ref 0.1–1.5)
BUN SERPL-MCNC: 14 MG/DL (ref 8–22)
CALCIUM ALBUM COR SERPL-MCNC: 9.8 MG/DL (ref 8.5–10.5)
CALCIUM SERPL-MCNC: 10.3 MG/DL (ref 8.5–10.5)
CHLORIDE SERPL-SCNC: 105 MMOL/L (ref 96–112)
CHOLEST SERPL-MCNC: 231 MG/DL (ref 100–199)
CO2 SERPL-SCNC: 25 MMOL/L (ref 20–33)
CREAT SERPL-MCNC: 0.55 MG/DL (ref 0.5–1.4)
EST. AVERAGE GLUCOSE BLD GHB EST-MCNC: 105 MG/DL
ESTRADIOL SERPL-MCNC: 59.9 PG/ML
FSH SERPL-ACNC: 7.4 MIU/ML
GFR SERPLBLD CREATININE-BSD FMLA CKD-EPI: 116 ML/MIN/1.73 M 2
GLOBULIN SER CALC-MCNC: 3.7 G/DL (ref 1.9–3.5)
GLUCOSE SERPL-MCNC: 79 MG/DL (ref 65–99)
HBA1C MFR BLD: 5.3 % (ref 4–5.6)
HDLC SERPL-MCNC: 58 MG/DL
LDLC SERPL CALC-MCNC: 139 MG/DL
LH SERPL-ACNC: 6.6 IU/L
POTASSIUM SERPL-SCNC: 4.1 MMOL/L (ref 3.6–5.5)
PROGEST SERPL-MCNC: 0.17 NG/ML
PROT SERPL-MCNC: 8.3 G/DL (ref 6–8.2)
SODIUM SERPL-SCNC: 142 MMOL/L (ref 135–145)
T3FREE SERPL-MCNC: 2.55 PG/ML (ref 2–4.4)
T4 FREE SERPL-MCNC: 0.96 NG/DL (ref 0.93–1.7)
TRIGL SERPL-MCNC: 170 MG/DL (ref 0–149)
TSH SERPL DL<=0.005 MIU/L-ACNC: 1.59 UIU/ML (ref 0.38–5.33)

## 2023-02-01 LAB
IGF-I SERPL-MCNC: 185 NG/ML (ref 71–258)
IGF-I Z-SCORE SERPL: 0.8
THYROGLOB AB SERPL-ACNC: <0.9 IU/ML (ref 0–4)

## 2023-02-04 LAB
SHBG SERPL-SCNC: 45 NMOL/L (ref 25–122)
TESTOST FREE SERPL-MCNC: 3.3 PG/ML (ref 1.1–5.8)
TESTOST SERPL-MCNC: 24 NG/DL (ref 9–55)

## 2023-06-13 ENCOUNTER — GYNECOLOGY VISIT (OUTPATIENT)
Dept: OBGYN | Facility: CLINIC | Age: 43
End: 2023-06-13
Payer: COMMERCIAL

## 2023-06-13 VITALS — SYSTOLIC BLOOD PRESSURE: 132 MMHG | WEIGHT: 217 LBS | BODY MASS INDEX: 32.05 KG/M2 | DIASTOLIC BLOOD PRESSURE: 90 MMHG

## 2023-06-13 DIAGNOSIS — Z12.39 BREAST SCREENING: ICD-10-CM

## 2023-06-13 PROCEDURE — 3075F SYST BP GE 130 - 139MM HG: CPT | Performed by: OBSTETRICS & GYNECOLOGY

## 2023-06-13 PROCEDURE — 3080F DIAST BP >= 90 MM HG: CPT | Performed by: OBSTETRICS & GYNECOLOGY

## 2023-06-13 PROCEDURE — 99386 PREV VISIT NEW AGE 40-64: CPT | Performed by: OBSTETRICS & GYNECOLOGY

## 2023-06-13 ASSESSMENT — FIBROSIS 4 INDEX: FIB4 SCORE: 1.11

## 2023-06-13 NOTE — PROGRESS NOTES
Mattie Hicks is a 43 y.o.  female who presents for her Annual Gynecologic Exam      HPI Comments: Pt presents for her annual well woman exam.   Pt has no complaints.  Patient was seen by OB/GYN last year.  Reports normal Pap smear.  No complaints at this time.  Patient considering having IUD removed once spouse has a vasectomy.  Mirena IUD in place x4 years  No LMP recorded. Patient has had an implant.    Review of Systems:   Pertinent positives documented in HPI and all other systems reviewed & are negative    PGYN Hx: None    All PMH, PSH, allergies, social history and FH reviewed and updated today:  Past Medical History:   Diagnosis Date    Alcohol abuse     Back pain     Chronic RUQ pain 10/10/2018    Depression     Dyslipidemia 2021    Elevated liver enzymes 2021    Indigestion     Seizure (HCC)     Vitamin D deficiency 2022       Past Surgical History:   Procedure Laterality Date    GYN SURGERY      PRIMARY C SECTION         Medications:   Current Outpatient Medications Ordered in Epic   Medication Sig Dispense Refill    cyclobenzaprine (FLEXERIL) 5 mg tablet Take 1-2 Tablets by mouth at bedtime as needed for Muscle Spasms. 30 Tablet 0    desvenlafaxine succinate (PRISTIQ) 50 MG TABLET SR 24 HR Take 50 mg by mouth every day.      traZODone (DESYREL) 100 MG Tab Take 100 mg by mouth at bedtime.      LORazepam (ATIVAN) 1 MG Tab Take 1 mg by mouth every day.      levonorgestrel (MIRENA) 52 mg (20 mcg/24 hr) IUD 1 Each by Intrauterine route Continuous.       No current Epic-ordered facility-administered medications on file.       Prednisone    Social History     Socioeconomic History    Marital status:    Tobacco Use    Smoking status: Every Day     Packs/day: 0.00     Types: Cigarettes     Last attempt to quit: 2015     Years since quittin.4    Smokeless tobacco: Never   Vaping Use    Vaping Use: Never used   Substance and Sexual Activity    Alcohol use: Yes      Alcohol/week: 7.2 - 9.6 oz     Types: 6 - 8 Cans of beer, 6 - 8 Shots of liquor per week     Comment: occ    Drug use: No    Sexual activity: Yes     Partners: Male     Birth control/protection: I.U.D.       Family History   Problem Relation Age of Onset    No Known Problems Mother     COPD Father     Heart Failure Maternal Grandfather           Objective:   Vital measurements:  BP (!) 132/90 (BP Location: Right arm, Patient Position: Sitting, BP Cuff Size: Small adult)   Wt 217 lb   Body mass index is 32.05 kg/m². (Goal BM I>18 <25)    Physical Exam   Nursing note and vitals reviewed.  Constitutional: She is oriented to person, place, and time. She appears well-developed and well-nourished. No distress.     HEENT:   Head: Normocephalic and atraumatic.   Right Ear: External ear normal.   Left Ear: External ear normal.   Nose: Nose normal.   Eyes: Conjunctivae and EOM are normal. Pupils are equal, round, and reactive to light. No scleral icterus.     Neck: Normal range of motion.     Breast: Symmetrical, normal consistency without masses., No dimpling or skin changes, Normal nipples without discharge    Abdominal: Soft. Bowel sounds are normal. She exhibits no distension and no mass. No tenderness. She has no rebound and no guarding.     Genitourinary:  Pelvic exam was performed with patient supine.  External genitalia with no abnormal pigmentation, labial fusion, rashes, tenderness, lesions or injury to the labia bilaterally.  BUS normal  Vagina is pink and moist with no lesions, foul discharge, erythema, tenderness or bleeding. No foreign body around the vagina or signs of injury.   Cervix exhibits no motion tenderness, no discharge and no friability, no lesions.   Uterus is 7 cm and not deviated, not enlarged, not fixed and not tender.  Right adnexa displays no mass, no tenderness and no fullness.  Left adnexa displays no mass, no tenderness and no fullness.     Musculoskeletal: Normal range of motion. Non  tender. She exhibits no edema and no tenderness.     Lymphadenopathy: She has no cervical or supraclavicular adenopathy.     Neurological: She is alert and oriented to person, place, and time. She exhibits normal muscle tone.     Skin: Skin is warm and dry. No rash noted. She is not diaphoretic. No erythema. No pallor.     Psychiatric: She has a normal mood and affect. Her behavior is normal. Judgment and thought content normal.        Assessment:     1. Encounter for IUD removal  Consent for all Surgical, Special Diagnostic or Therapeutic Procedures    Consent for all Surgical, Special Diagnostic or Therapeutic Procedures      2. Breast screening  MA-SCREENING MAMMO BILAT W/TOMOSYNTHESIS W/CAD            Plan:   Physical exam performed.  Will obtain Pap records from prior OB/GYN provider.  Self breast awareness discussed.  Encouraged exercise and proper diet.  Mammograms starting @ age 40 annually.  Order given  See medications and orders placed in encounter report.    IUD removal once spouse has vasectomy    Breast cancer risk assessment tool says lifetime risk approximately 6-1/2%.  This was discussed with the patient.  Continue yearly mammograms.    Follow up in 1 year for annual exam or sooner as needed

## 2023-09-04 ENCOUNTER — HOSPITAL ENCOUNTER (OUTPATIENT)
Facility: MEDICAL CENTER | Age: 43
End: 2023-09-04
Payer: COMMERCIAL

## 2023-09-04 ENCOUNTER — OFFICE VISIT (OUTPATIENT)
Dept: URGENT CARE | Facility: PHYSICIAN GROUP | Age: 43
End: 2023-09-04
Payer: COMMERCIAL

## 2023-09-04 VITALS
TEMPERATURE: 97.1 F | HEIGHT: 69 IN | BODY MASS INDEX: 31.7 KG/M2 | OXYGEN SATURATION: 97 % | DIASTOLIC BLOOD PRESSURE: 60 MMHG | SYSTOLIC BLOOD PRESSURE: 120 MMHG | WEIGHT: 214 LBS | RESPIRATION RATE: 18 BRPM | HEART RATE: 91 BPM

## 2023-09-04 DIAGNOSIS — R39.9 UTI SYMPTOMS: ICD-10-CM

## 2023-09-04 LAB
AMBIGUOUS DTTM AMBI4: NORMAL
AMBIGUOUS DTTM AMBI4: NORMAL
APPEARANCE UR: CLEAR
BILIRUB UR STRIP-MCNC: NORMAL MG/DL
COLOR UR AUTO: YELLOW
GLUCOSE UR STRIP.AUTO-MCNC: NORMAL MG/DL
KETONES UR STRIP.AUTO-MCNC: NORMAL MG/DL
LEUKOCYTE ESTERASE UR QL STRIP.AUTO: NORMAL
NITRITE UR QL STRIP.AUTO: NORMAL
PH UR STRIP.AUTO: 6.5 [PH] (ref 5–8)
PROT UR QL STRIP: NORMAL MG/DL
RBC UR QL AUTO: NORMAL
SP GR UR STRIP.AUTO: 1.01
UROBILINOGEN UR STRIP-MCNC: 0.2 MG/DL

## 2023-09-04 PROCEDURE — 81002 URINALYSIS NONAUTO W/O SCOPE: CPT

## 2023-09-04 PROCEDURE — 87510 GARDNER VAG DNA DIR PROBE: CPT

## 2023-09-04 PROCEDURE — 87480 CANDIDA DNA DIR PROBE: CPT

## 2023-09-04 PROCEDURE — 87086 URINE CULTURE/COLONY COUNT: CPT

## 2023-09-04 PROCEDURE — 3074F SYST BP LT 130 MM HG: CPT

## 2023-09-04 PROCEDURE — 99213 OFFICE O/P EST LOW 20 MIN: CPT

## 2023-09-04 PROCEDURE — 87660 TRICHOMONAS VAGIN DIR PROBE: CPT

## 2023-09-04 PROCEDURE — 3078F DIAST BP <80 MM HG: CPT

## 2023-09-04 RX ORDER — NITROFURANTOIN 25; 75 MG/1; MG/1
100 CAPSULE ORAL 2 TIMES DAILY
Qty: 10 CAPSULE | Refills: 0 | Status: SHIPPED | OUTPATIENT
Start: 2023-09-04 | End: 2023-09-09

## 2023-09-04 ASSESSMENT — ENCOUNTER SYMPTOMS
BACK PAIN: 1
CHILLS: 0
FEVER: 0

## 2023-09-04 ASSESSMENT — FIBROSIS 4 INDEX: FIB4 SCORE: 1.11

## 2023-09-04 NOTE — PROGRESS NOTES
Subjective:   Mattie Hicks is a 43 y.o. female who presents for UTI (X 1 wk burning when urinating, urgency, low back pain)      HPI: This is a 43-year-old female who presents today for UTI symptoms.  Patient reports developing urinary frequency and low back pain 1 week ago.  She reports developing burning with urination last night.  She has not taken anything for her symptoms.  She does report having some vaginal burning and pelvic discomfort. She denies vaginal discharge.  She denies concern for STI.  No fevers, chills, body aches.  No history of UTIs or bacterial vaginosis in the past.      Review of Systems   Constitutional:  Negative for chills, fever and malaise/fatigue.   Genitourinary:  Positive for dysuria and frequency.        Suprapubic pain, vaginal burning   Musculoskeletal:  Positive for back pain.   All other systems reviewed and are negative.      Medications:    Current Outpatient Medications on File Prior to Visit   Medication Sig Dispense Refill    cyclobenzaprine (FLEXERIL) 5 mg tablet Take 1-2 Tablets by mouth at bedtime as needed for Muscle Spasms. 30 Tablet 0    traZODone (DESYREL) 100 MG Tab Take 100 mg by mouth at bedtime.      LORazepam (ATIVAN) 1 MG Tab Take 1 mg by mouth every day.      levonorgestrel (MIRENA) 52 mg (20 mcg/24 hr) IUD 1 Each by Intrauterine route Continuous.      desvenlafaxine succinate (PRISTIQ) 50 MG TABLET SR 24 HR Take 50 mg by mouth every day. (Patient not taking: Reported on 9/4/2023)       No current facility-administered medications on file prior to visit.        Allergies:   Prednisone    Problem List:   Patient Active Problem List   Diagnosis    MDD (major depressive disorder)    KATTY (generalized anxiety disorder)    Primary insomnia    Elevated MCV    Mixed hyperlipidemia    Fatty liver    Alcohol dependence (HCC)        Surgical History:  Past Surgical History:   Procedure Laterality Date    GYN SURGERY      PRIMARY C SECTION         Past Social  "Hx:   Social History     Tobacco Use    Smoking status: Every Day     Current packs/day: 0.00     Types: Cigarettes     Last attempt to quit: 2015     Years since quittin.7    Smokeless tobacco: Never   Vaping Use    Vaping Use: Never used   Substance Use Topics    Alcohol use: Yes     Alcohol/week: 7.2 - 9.6 oz     Types: 6 - 8 Cans of beer, 6 - 8 Shots of liquor per week     Comment: occ    Drug use: No          Problem list, medications, and allergies reviewed by myself today in Epic.     Objective:     /60 (BP Location: Left arm, Patient Position: Sitting, BP Cuff Size: Adult)   Pulse 91   Temp 36.2 °C (97.1 °F) (Temporal)   Resp 18   Ht 1.753 m (5' 9\")   Wt 97.1 kg (214 lb)   SpO2 97%   BMI 31.60 kg/m²     Physical Exam  Vitals and nursing note reviewed.   Constitutional:       General: She is not in acute distress.     Appearance: Normal appearance. She is normal weight. She is not ill-appearing, toxic-appearing or diaphoretic.   HENT:      Head: Normocephalic and atraumatic.   Cardiovascular:      Rate and Rhythm: Normal rate and regular rhythm.      Pulses: Normal pulses.      Heart sounds: Normal heart sounds. No murmur heard.     No friction rub. No gallop.   Pulmonary:      Effort: Pulmonary effort is normal. No respiratory distress.      Breath sounds: Normal breath sounds. No stridor. No wheezing, rhonchi or rales.   Chest:      Chest wall: No tenderness.   Abdominal:      Tenderness: There is no right CVA tenderness or left CVA tenderness.   Genitourinary:     Comments: Deferred.  Patient denies lesions or vaginal discharge  Skin:     General: Skin is warm and dry.      Capillary Refill: Capillary refill takes less than 2 seconds.   Neurological:      General: No focal deficit present.      Mental Status: She is alert and oriented to person, place, and time. Mental status is at baseline.   Psychiatric:         Mood and Affect: Mood normal.         Behavior: Behavior normal.       "   Thought Content: Thought content normal.         Judgment: Judgment normal.         Assessment/Plan:     Diagnosis and associated orders:   1. UTI symptoms  POCT Urinalysis    URINE CULTURE(NEW)    nitrofurantoin (MACROBID) 100 MG Cap    VAGINAL PATHOGENS DNA PANEL             Comments/MDM:   Pt is clinically stable at today's acute urgent care visit.  No acute distress noted. Appropriate for outpatient management at this time.   Acute problem.  Patient presents today for UTI symptoms.  POC UA is unremarkable.  She will be treated empirically for UTI with 5-day course of nitrofurantoin.  Given patient's complaints of vaginal burning, vaginal swab obtained to run vaginal pathogens panel.  Advised patient to monitor MyChart for results and result messaging.  She will be advised to start taking nitrofurantoin as prescribed, urine will be sent for culture.  Vaginal pathogens will be sent to lab, will follow.  She is to return for any new or worsening signs or symptoms or signs of fail to improve.  Patient is agreeable this plan of care verbalizes good understanding.           Discussed DDx, management options (risks,benefits, and alternatives to planned treatment), natural progression and supportive care.  Expressed understanding and the treatment plan was agreed upon. Questions were encouraged and answered   Return to urgent care prn if new or worsening sx or if there is no improvement in condition prn.    Educated in Red flags and indications to immediately call 911 or present to the Emergency Department.   Advised the patient to follow-up with the primary care physician for recheck, reevaluation, and consideration of further management.    I personally reviewed prior external notes and test results pertinent to today's visit.  I have independently reviewed and interpreted all diagnostics ordered during this urgent care acute visit.         Please note that this dictation was created using voice recognition  software. I have made a reasonable attempt to correct obvious errors, but I expect that there are errors of grammar and possibly content that I did not discover before finalizing the note.    This note was electronically signed by TONEY Ramos

## 2023-09-05 LAB
CANDIDA DNA VAG QL PROBE+SIG AMP: NEGATIVE
G VAGINALIS DNA VAG QL PROBE+SIG AMP: NEGATIVE
T VAGINALIS DNA VAG QL PROBE+SIG AMP: NEGATIVE

## 2023-09-07 LAB
BACTERIA UR CULT: NORMAL
SIGNIFICANT IND 70042: NORMAL
SITE SITE: NORMAL
SOURCE SOURCE: NORMAL

## 2023-09-27 ENCOUNTER — GYNECOLOGY VISIT (OUTPATIENT)
Dept: OBGYN | Facility: CLINIC | Age: 43
End: 2023-09-27
Payer: COMMERCIAL

## 2023-09-27 ENCOUNTER — HOSPITAL ENCOUNTER (OUTPATIENT)
Facility: MEDICAL CENTER | Age: 43
End: 2023-09-27
Attending: PHYSICIAN ASSISTANT
Payer: COMMERCIAL

## 2023-09-27 VITALS — WEIGHT: 216 LBS | BODY MASS INDEX: 31.9 KG/M2

## 2023-09-27 DIAGNOSIS — R39.9 UTI SYMPTOMS: ICD-10-CM

## 2023-09-27 DIAGNOSIS — R10.2 PELVIC PAIN: ICD-10-CM

## 2023-09-27 DIAGNOSIS — R10.2 VAGINAL PAIN: ICD-10-CM

## 2023-09-27 DIAGNOSIS — R35.0 URINARY FREQUENCY: ICD-10-CM

## 2023-09-27 LAB
APPEARANCE UR: CLEAR
BILIRUB UR STRIP-MCNC: NORMAL MG/DL
COLOR UR AUTO: YELLOW
GLUCOSE UR STRIP.AUTO-MCNC: NORMAL MG/DL
KETONES UR STRIP.AUTO-MCNC: NORMAL MG/DL
LEUKOCYTE ESTERASE UR QL STRIP.AUTO: NORMAL
NITRITE UR QL STRIP.AUTO: NORMAL
PH UR STRIP.AUTO: 6.5 [PH] (ref 5–8)
PROT UR QL STRIP: NORMAL MG/DL
RBC UR QL AUTO: NORMAL
SP GR UR STRIP.AUTO: 1.02
UROBILINOGEN UR STRIP-MCNC: 0.2 MG/DL

## 2023-09-27 PROCEDURE — 99213 OFFICE O/P EST LOW 20 MIN: CPT | Performed by: PHYSICIAN ASSISTANT

## 2023-09-27 PROCEDURE — 87660 TRICHOMONAS VAGIN DIR PROBE: CPT

## 2023-09-27 PROCEDURE — 87510 GARDNER VAG DNA DIR PROBE: CPT

## 2023-09-27 PROCEDURE — 87480 CANDIDA DNA DIR PROBE: CPT

## 2023-09-27 PROCEDURE — 81002 URINALYSIS NONAUTO W/O SCOPE: CPT | Performed by: PHYSICIAN ASSISTANT

## 2023-09-27 ASSESSMENT — FIBROSIS 4 INDEX: FIB4 SCORE: 1.11

## 2023-09-27 NOTE — PROGRESS NOTES
GYN PROBLEM VISIT    CC:  GYN complaint        HPI: Patient is a 43 y.o.  No LMP due to Mirena IUD with contraception.  Pt c/o deep vaginal burning and pelvic pain intermittently (not a/w urination) and urinary frequency x 4 weeks. Went to UC 1 week into sx but urine culture was negative. Was given Macrobid x 5 days which she completed. No hematuria, n/v, flank pain, fevers, vaginal odor, itching, or abnormal discharge. Hx of UTIs but this feels different.      ROS:   General: denies fever / chills  HEENT: denies sore throat:  CV: denies chest pain:  Repiratory: denies shortness of breath  GI: denies abdominal pain  : denies dysuria:    PFSH:  I personally reviewed the past medical and surgical histories.     Social History     Tobacco Use    Smoking status: Every Day     Current packs/day: 0.00     Types: Cigarettes     Last attempt to quit: 2015     Years since quittin.7    Smokeless tobacco: Never   Vaping Use    Vaping Use: Never used   Substance Use Topics    Alcohol use: Yes     Alcohol/week: 7.2 - 9.6 oz     Types: 6 - 8 Cans of beer, 6 - 8 Shots of liquor per week     Comment: occ    Drug use: No        Social History     Substance and Sexual Activity   Sexual Activity Yes    Partners: Male    Birth control/protection: I.U.D.        ALLERGIES / REACTIONS:  Allergies   Allergen Reactions    Prednisone Anxiety                           PHYSICAL EXAMINATION:  Vital Signs:   Wt 216 lb   BMI 31.90 kg/m²     Gen: appears well, NAD  Respiratory: normal effort  Abdomen: Soft, bilateral mild suprapubic TTP. No CVAT.   Pelvic:    Vulva: normal.    Urethra: normal.   Vagina: mild thick white discharge, no lesions.    Cervix: pink, moist, no erythema or cervical motion tenderness.    Uterus: normal size, shape and contour.    left adnexa: non-tender, no masses.   right adnexa: non-tender, no masses.   Perineum: normal.    ASSESSMENT AND PLAN:  43 y.o.      1. Pelvic pain    - UA neg. No  culture indicated and sx do not correlate with UTI.   - VAGINAL PATHOGENS DNA PANEL; path due to vaginal pain and discharge on exam. Vag path in UC was self collected.   - US-PELVIC COMPLETE (TRANSABDOMINAL/TRANSVAGINAL) (COMBO); Future  - Try vaginal moisturizer to help with vaginal irritation.         Follow up pending results     Lauren Payne P.A.-C.

## 2023-11-05 NOTE — PROGRESS NOTES
Virtual Visit: Established Patient   This visit was conducted via Zoom using secure and encrypted videoconferencing technology. The patient was in a private location in the state of [place].    The patient's identity was confirmed and verbal consent was obtained for this virtual visit.    Subjective:   CC:   Chief Complaint   Patient presents with    Hip Pain     Right side only for a couple of months now patient states no falls or injuries        Mattie Hicks is a 43 y.o. female who presents for follow-up virtual visit. The patient reports a 3-month history of right hip pain.  She does not recall an antecedent fall or injury.  She states the pain is located over her lateral hip.  She states it hurts when she is sitting or standing for prolonged periods of time.  She has been stretching and taking ibuprofen with some improvement.  The patient is also due for updated labs.  She receives lorazepam from an outside provider.  She has been taking a GLP-1 agonist and has noticed decreased alcohol cravings.    ROS   Denies any recent fevers or chills. No nausea or vomiting. No chest pains or shortness of breath.     Allergies   Allergen Reactions    Prednisone Anxiety       Current medicines (including changes today)  Current Outpatient Medications   Medication Sig Dispense Refill    vitamin D2, Ergocalciferol, (DRISDOL) 1.25 MG (47774 UT) Cap capsule Take 1 Capsule by mouth every 7 days. 4 Capsule 2    traZODone (DESYREL) 100 MG Tab Take 100 mg by mouth at bedtime.      LORazepam (ATIVAN) 1 MG Tab Take 1 mg by mouth every day.      levonorgestrel (MIRENA) 52 mg (20 mcg/24 hr) IUD 1 Each by Intrauterine route Continuous.      levothyroxine (SYNTHROID) 75 MCG Tab       desvenlafaxine succinate (PRISTIQ) 50 MG TABLET SR 24 HR Take 50 mg by mouth every day. (Patient not taking: Reported on 9/4/2023)       No current facility-administered medications for this visit.       Patient Active Problem List    Diagnosis Date Noted     Acquired hypothyroidism 11/07/2023    Vitamin D deficiency 02/22/2022    Alcohol dependence (HCC) 11/04/2021    Primary insomnia 11/02/2021    Elevated MCV 11/02/2021    Mixed hyperlipidemia 11/02/2021    Elevated liver enzymes 11/02/2021    Fatty liver 11/02/2021    KATTY (generalized anxiety disorder) 05/01/2020    MDD (major depressive disorder) 08/12/2019       Family History   Problem Relation Age of Onset    No Known Problems Mother     COPD Father     Heart Failure Maternal Grandfather        She  has a past medical history of Acquired hypothyroidism (11/7/2023), Alcohol abuse, Back pain, Chronic RUQ pain (10/10/2018), Depression, Dyslipidemia (11/2/2021), Elevated liver enzymes (11/2/2021), Indigestion, Seizure (HCC), and Vitamin D deficiency (2/22/2022).    She has no past medical history of Addisons disease (HCC), Adrenal disorder (HCC), Allergy, Anemia, Anginal syndrome (HCC), Arrhythmia, Arthritis, Asthma, At risk for sleep apnea, Blood clotting disorder (HCC), Blood transfusion without reported diagnosis, Bronchitis, Cancer (HCC), Cataract, Congestive heart failure (HCC), COPD (chronic obstructive pulmonary disease) (HCC), Cushings syndrome (HCC), Diabetes (HCC), Diabetic neuropathy (HCC), Dialysis patient (HCC), Fall, GERD (gastroesophageal reflux disease), Glaucoma, Goiter, Gynecological disorder, Head ache, Heart murmur, Heart valve disease, Hemorrhagic disorder (HCC), HIV (human immunodeficiency virus infection) (HCC), Hypertension, IBD (inflammatory bowel disease), Infectious disease, Jaundice, Meningitis, Migraine, Muscle disorder, Myocardial infarct (HCC), Osteoporosis, Pacemaker, Parathyroid disorder (HCC), Pituitary disease (HCC), Pneumonia, Pulmonary emphysema (HCC), Renal disorder, Rheumatic fever, Sickle cell disease (HCC), Stroke (HCC), Tuberculosis, Urinary incontinence, or Urinary tract infection.  She  has a past surgical history that includes primary c section and gyn surgery.        "Objective:   Ht 1.727 m (5' 8\")   Wt 97.5 kg (215 lb)   BMI 32.69 kg/m²     Physical Exam:  Constitutional: Alert, no distress, well-groomed.  Skin: No rashes in visible areas.  Eye: Round. Conjunctiva clear, lids normal. No icterus.   ENMT: Lips pink without lesions, good dentition, moist mucous membranes. Phonation normal.  Neck: No masses, no thyromegaly. Moves freely without pain.  Respiratory: Unlabored respiratory effort, no cough or audible wheeze  Psych: Alert and oriented x3, normal affect and mood.       Assessment and Plan:   The following treatment plan was discussed:     Trochanteric bursitis of right hip  Acute condition.  The patient reports a 3-month history of right hip pain.  She does not recall an antecedent fall or injury.  She states the pain is located over her lateral hip.  She states it hurts when she is sitting or standing for prolonged periods of time.  She has been stretching and taking ibuprofen with some improvement.  Given the location of the pain discussed with patient that this is likely trochanteric bursitis.  We will obtain an x-ray which would show osteoarthritis if it is present, but is unlikely to show trochanteric bursitis.  Will also refer to orthopedics as this condition is very responsive to corticosteroid injections.  The patient is agreeable to this plan.  - DX-HIP-COMPLETE - UNILATERAL 2+ RIGHT; Future  - Referral to Orthopedics    Acquired hypothyroidism  Chronic condition, controlled.  The patient currently takes levothyroxine 75 mcg daily.  Most recent labs on 1/30/2023 showed a normal TSH of 1.59 and a normal free T4 of 0.96.  The patient states she is no longer taking her thyroid medication as she was unable to afford it.  -Continue current regimen of levothyroxine 75 mcg daily, prescribed by an outside provider  - levothyroxine (SYNTHROID) 75 MCG Tab  - FREE THYROXINE; Future  - TSH; Future    Elevated liver enzymes  Fatty liver  Chronic condition, progressive.  " Most recent liver enzymes on 1/30/2023 were elevated with an AST of 69 and an ALT of 101. Right upper quadrant ultrasound on 4/20/2021 showed hepatomegaly and hepatic steatosis.  She is due for updated labs.  - Comp Metabolic Panel; Future  - GAMMA GT (GGT); Future     Uncomplicated alcohol dependence (HCC)  Elevated MCV  Chronic condition, progressive.  Likely secondary to chronic alcohol use.  Most recent MCV on 1/30/2023 was elevated at 102, up from a previous value of 100.2.  Her hemoglobin and hematocrit were normal at 14.8 and 45.5.  Previous folic acid level was normal at 12.6 and B12 was normal at 303.  She is due for updated labs.  - CBC WITH DIFFERENTIAL; Future  - FOLATE; Future  - VITAMIN B12; Future     Mixed hyperlipidemia  Chronic condition, progressive.  Currently managed with dietary modification.  Most recent lipid panel on 1/30/2023 showed a total cholesterol 231, , HDL 58, triglycerides 170. The 10-year ASCVD risk score (Betsy ONEILL, et al., 2019) is: 2.8%.  She is due for updated labs.  -Continue dietary management with a low-cholesterol diet given the patient's low 10-year ASCVD risk score  - Comp Metabolic Panel; Future  - Lipid Profile; Future    Primary insomnia  Chronic condition, controlled.  The patient currently takes trazodone 100 mg nightly and reports this medication works well for her with limited side effects.  -Continue current regimen of trazodone 100 mg nightly, prescribed by an outside provider    Vitamin D deficiency  Chronic condition, stable.  Most recent vitamin D level on 1/30/2023 was reduced at 17.1.  She is due for updated labs.  -Start high-dose vitamin D 50,000 units weekly  - VITAMIN D,25 HYDROXY (DEFICIENCY); Future  - vitamin D2, Ergocalciferol, (DRISDOL) 1.25 MG (26639 UT) Cap capsule; Take 1 Capsule by mouth every 7 days.  Dispense: 4 Capsule; Refill: 2    KATTY (generalized anxiety disorder)  Mild episode of recurrent major depressive disorder (HCC)  Chronic  condition, controlled.  The patient was previously on Pristiq 50 mg daily, but is no longer taking this medication.  She also takes lorazepam 1 mg on an as-needed basis, prescribed by an outside provider.  She states a 30-day supply will typically last her 2 to 3 months.Review of the patient's  shows that she was last given a prescription for lorazepam 1 mg, dispense #30, on 10/30/2023. Obtained and reviewed patient utilization report from [facility] pharmacy database on 11/7/2023 2:50 AM  prior to writing prescription for controlled substance II, III or IV per [place] bill . Based on assessment of the report, the prescription is medically necessary.   -Continue current regimen of lorazepam 1 mg daily as needed for anxiety    Encounter for screening for HIV  - HIV AG/AB COMBO ASSAY SCREENING; Future    Need for hepatitis C screening test  - HEP C VIRUS ANTIBODY; Future       Follow-up: Return in about 3 months (around 2/7/2024) for 3-month f/u visit.    Please note that this dictation was created using voice recognition software. I have made every reasonable attempt to correct obvious errors, but I expect that there are errors of grammar and possibly content that I did not discover before finalizing the note.

## 2023-11-07 ENCOUNTER — TELEMEDICINE (OUTPATIENT)
Dept: MEDICAL GROUP | Facility: PHYSICIAN GROUP | Age: 43
End: 2023-11-07
Payer: COMMERCIAL

## 2023-11-07 VITALS — WEIGHT: 215 LBS | BODY MASS INDEX: 32.58 KG/M2 | HEIGHT: 68 IN

## 2023-11-07 DIAGNOSIS — E03.9 ACQUIRED HYPOTHYROIDISM: ICD-10-CM

## 2023-11-07 DIAGNOSIS — F10.20 UNCOMPLICATED ALCOHOL DEPENDENCE (HCC): ICD-10-CM

## 2023-11-07 DIAGNOSIS — F51.01 PRIMARY INSOMNIA: ICD-10-CM

## 2023-11-07 DIAGNOSIS — R74.8 ELEVATED LIVER ENZYMES: ICD-10-CM

## 2023-11-07 DIAGNOSIS — M70.61 TROCHANTERIC BURSITIS OF RIGHT HIP: ICD-10-CM

## 2023-11-07 DIAGNOSIS — E55.9 VITAMIN D DEFICIENCY: ICD-10-CM

## 2023-11-07 DIAGNOSIS — F41.1 GAD (GENERALIZED ANXIETY DISORDER): ICD-10-CM

## 2023-11-07 DIAGNOSIS — K76.0 FATTY LIVER: ICD-10-CM

## 2023-11-07 DIAGNOSIS — Z11.4 ENCOUNTER FOR SCREENING FOR HIV: ICD-10-CM

## 2023-11-07 DIAGNOSIS — F33.0 MILD EPISODE OF RECURRENT MAJOR DEPRESSIVE DISORDER (HCC): ICD-10-CM

## 2023-11-07 DIAGNOSIS — Z11.59 NEED FOR HEPATITIS C SCREENING TEST: ICD-10-CM

## 2023-11-07 DIAGNOSIS — R71.8 ELEVATED MCV: ICD-10-CM

## 2023-11-07 DIAGNOSIS — E78.2 MIXED HYPERLIPIDEMIA: ICD-10-CM

## 2023-11-07 PROCEDURE — 99214 OFFICE O/P EST MOD 30 MIN: CPT | Mod: 95 | Performed by: INTERNAL MEDICINE

## 2023-11-07 RX ORDER — LEVOTHYROXINE SODIUM 0.07 MG/1
TABLET ORAL
COMMUNITY
Start: 2023-09-03 | End: 2024-02-05

## 2023-11-07 RX ORDER — ERGOCALCIFEROL 1.25 MG/1
50000 CAPSULE ORAL
Qty: 4 CAPSULE | Refills: 2 | Status: SHIPPED | OUTPATIENT
Start: 2023-11-07

## 2023-11-07 ASSESSMENT — FIBROSIS 4 INDEX: FIB4 SCORE: 1.11

## 2023-11-14 ENCOUNTER — HOSPITAL ENCOUNTER (OUTPATIENT)
Dept: RADIOLOGY | Facility: MEDICAL CENTER | Age: 43
End: 2023-11-14
Attending: PHYSICIAN ASSISTANT
Payer: COMMERCIAL

## 2023-11-14 ENCOUNTER — HOSPITAL ENCOUNTER (OUTPATIENT)
Dept: RADIOLOGY | Facility: MEDICAL CENTER | Age: 43
End: 2023-11-14
Attending: INTERNAL MEDICINE
Payer: COMMERCIAL

## 2023-11-14 DIAGNOSIS — R10.2 PELVIC PAIN: ICD-10-CM

## 2023-11-14 DIAGNOSIS — M70.61 TROCHANTERIC BURSITIS OF RIGHT HIP: ICD-10-CM

## 2023-11-14 PROCEDURE — 76830 TRANSVAGINAL US NON-OB: CPT

## 2023-11-14 PROCEDURE — 73502 X-RAY EXAM HIP UNI 2-3 VIEWS: CPT | Mod: RT

## 2024-01-16 ENCOUNTER — HOSPITAL ENCOUNTER (OUTPATIENT)
Dept: LAB | Facility: MEDICAL CENTER | Age: 44
End: 2024-01-16
Attending: INTERNAL MEDICINE
Payer: COMMERCIAL

## 2024-01-16 DIAGNOSIS — R74.8 ELEVATED LIVER ENZYMES: ICD-10-CM

## 2024-01-16 DIAGNOSIS — E78.2 MIXED HYPERLIPIDEMIA: ICD-10-CM

## 2024-01-16 DIAGNOSIS — Z11.4 ENCOUNTER FOR SCREENING FOR HIV: ICD-10-CM

## 2024-01-16 DIAGNOSIS — E03.9 ACQUIRED HYPOTHYROIDISM: ICD-10-CM

## 2024-01-16 DIAGNOSIS — R71.8 ELEVATED MCV: ICD-10-CM

## 2024-01-16 DIAGNOSIS — E55.9 VITAMIN D DEFICIENCY: ICD-10-CM

## 2024-01-16 DIAGNOSIS — Z11.59 NEED FOR HEPATITIS C SCREENING TEST: ICD-10-CM

## 2024-01-16 LAB
ALBUMIN SERPL BCP-MCNC: 4.5 G/DL (ref 3.2–4.9)
ALBUMIN/GLOB SERPL: 1.4 G/DL
ALP SERPL-CCNC: 77 U/L (ref 30–99)
ALT SERPL-CCNC: 23 U/L (ref 2–50)
ANION GAP SERPL CALC-SCNC: 11 MMOL/L (ref 7–16)
AST SERPL-CCNC: 17 U/L (ref 12–45)
BASOPHILS # BLD AUTO: 0.6 % (ref 0–1.8)
BASOPHILS # BLD: 0.04 K/UL (ref 0–0.12)
BILIRUB SERPL-MCNC: 0.4 MG/DL (ref 0.1–1.5)
BUN SERPL-MCNC: 14 MG/DL (ref 8–22)
CALCIUM ALBUM COR SERPL-MCNC: 9.6 MG/DL (ref 8.5–10.5)
CALCIUM SERPL-MCNC: 10 MG/DL (ref 8.5–10.5)
CHLORIDE SERPL-SCNC: 103 MMOL/L (ref 96–112)
CHOLEST SERPL-MCNC: 196 MG/DL (ref 100–199)
CO2 SERPL-SCNC: 24 MMOL/L (ref 20–33)
CREAT SERPL-MCNC: 0.57 MG/DL (ref 0.5–1.4)
EOSINOPHIL # BLD AUTO: 0.14 K/UL (ref 0–0.51)
EOSINOPHIL NFR BLD: 2 % (ref 0–6.9)
ERYTHROCYTE [DISTWIDTH] IN BLOOD BY AUTOMATED COUNT: 43.8 FL (ref 35.9–50)
FASTING STATUS PATIENT QL REPORTED: NORMAL
GFR SERPLBLD CREATININE-BSD FMLA CKD-EPI: 114 ML/MIN/1.73 M 2
GLOBULIN SER CALC-MCNC: 3.3 G/DL (ref 1.9–3.5)
GLUCOSE SERPL-MCNC: 82 MG/DL (ref 65–99)
HCT VFR BLD AUTO: 43.4 % (ref 37–47)
HDLC SERPL-MCNC: 43 MG/DL
HGB BLD-MCNC: 14.4 G/DL (ref 12–16)
IMM GRANULOCYTES # BLD AUTO: 0.01 K/UL (ref 0–0.11)
IMM GRANULOCYTES NFR BLD AUTO: 0.1 % (ref 0–0.9)
LDLC SERPL CALC-MCNC: 116 MG/DL
LYMPHOCYTES # BLD AUTO: 3.11 K/UL (ref 1–4.8)
LYMPHOCYTES NFR BLD: 44.4 % (ref 22–41)
MCH RBC QN AUTO: 32.2 PG (ref 27–33)
MCHC RBC AUTO-ENTMCNC: 33.2 G/DL (ref 32.2–35.5)
MCV RBC AUTO: 97.1 FL (ref 81.4–97.8)
MONOCYTES # BLD AUTO: 0.52 K/UL (ref 0–0.85)
MONOCYTES NFR BLD AUTO: 7.4 % (ref 0–13.4)
NEUTROPHILS # BLD AUTO: 3.18 K/UL (ref 1.82–7.42)
NEUTROPHILS NFR BLD: 45.5 % (ref 44–72)
NRBC # BLD AUTO: 0 K/UL
NRBC BLD-RTO: 0 /100 WBC (ref 0–0.2)
PLATELET # BLD AUTO: 296 K/UL (ref 164–446)
PMV BLD AUTO: 10.7 FL (ref 9–12.9)
POTASSIUM SERPL-SCNC: 4.4 MMOL/L (ref 3.6–5.5)
PROT SERPL-MCNC: 7.8 G/DL (ref 6–8.2)
RBC # BLD AUTO: 4.47 M/UL (ref 4.2–5.4)
SODIUM SERPL-SCNC: 138 MMOL/L (ref 135–145)
TRIGL SERPL-MCNC: 184 MG/DL (ref 0–149)
WBC # BLD AUTO: 7 K/UL (ref 4.8–10.8)

## 2024-01-16 PROCEDURE — 84439 ASSAY OF FREE THYROXINE: CPT

## 2024-01-16 PROCEDURE — 82607 VITAMIN B-12: CPT

## 2024-01-16 PROCEDURE — 82746 ASSAY OF FOLIC ACID SERUM: CPT

## 2024-01-16 PROCEDURE — 80061 LIPID PANEL: CPT

## 2024-01-16 PROCEDURE — 87389 HIV-1 AG W/HIV-1&-2 AB AG IA: CPT

## 2024-01-16 PROCEDURE — 85025 COMPLETE CBC W/AUTO DIFF WBC: CPT

## 2024-01-16 PROCEDURE — 82306 VITAMIN D 25 HYDROXY: CPT

## 2024-01-16 PROCEDURE — 80053 COMPREHEN METABOLIC PANEL: CPT

## 2024-01-16 PROCEDURE — 82977 ASSAY OF GGT: CPT

## 2024-01-16 PROCEDURE — 86803 HEPATITIS C AB TEST: CPT

## 2024-01-16 PROCEDURE — 36415 COLL VENOUS BLD VENIPUNCTURE: CPT

## 2024-01-16 PROCEDURE — 84443 ASSAY THYROID STIM HORMONE: CPT

## 2024-01-17 LAB
25(OH)D3 SERPL-MCNC: 30 NG/ML (ref 30–100)
FOLATE SERPL-MCNC: 13.3 NG/ML
GGT SERPL-CCNC: 23 U/L (ref 7–34)
HCV AB SER QL: NORMAL
HIV 1+2 AB+HIV1 P24 AG SERPL QL IA: NORMAL
T4 FREE SERPL-MCNC: 1.22 NG/DL (ref 0.93–1.7)
TSH SERPL DL<=0.005 MIU/L-ACNC: 1.59 UIU/ML (ref 0.38–5.33)
VIT B12 SERPL-MCNC: 3221 PG/ML (ref 211–911)

## 2024-02-05 ENCOUNTER — OFFICE VISIT (OUTPATIENT)
Dept: MEDICAL GROUP | Facility: PHYSICIAN GROUP | Age: 44
End: 2024-02-05
Payer: COMMERCIAL

## 2024-02-05 VITALS
BODY MASS INDEX: 32.45 KG/M2 | TEMPERATURE: 98.1 F | RESPIRATION RATE: 20 BRPM | WEIGHT: 214.13 LBS | SYSTOLIC BLOOD PRESSURE: 122 MMHG | DIASTOLIC BLOOD PRESSURE: 70 MMHG | HEART RATE: 88 BPM | HEIGHT: 68 IN | OXYGEN SATURATION: 97 %

## 2024-02-05 DIAGNOSIS — R10.30 PAIN IN THE GROIN, UNSPECIFIED LATERALITY: ICD-10-CM

## 2024-02-05 PROCEDURE — 3074F SYST BP LT 130 MM HG: CPT

## 2024-02-05 PROCEDURE — 3078F DIAST BP <80 MM HG: CPT

## 2024-02-05 PROCEDURE — 99214 OFFICE O/P EST MOD 30 MIN: CPT

## 2024-02-05 RX ORDER — ESTRADIOL 0.1 MG/G
1 CREAM VAGINAL
Qty: 42.5 G | Refills: 0 | Status: SHIPPED | OUTPATIENT
Start: 2024-02-05

## 2024-02-05 ASSESSMENT — PATIENT HEALTH QUESTIONNAIRE - PHQ9
5. POOR APPETITE OR OVEREATING: NOT AT ALL
9. THOUGHTS THAT YOU WOULD BE BETTER OFF DEAD, OR OF HURTING YOURSELF: NOT AT ALL
1. LITTLE INTEREST OR PLEASURE IN DOING THINGS: MORE THAN HALF THE DAYS
8. MOVING OR SPEAKING SO SLOWLY THAT OTHER PEOPLE COULD HAVE NOTICED. OR THE OPPOSITE, BEING SO FIGETY OR RESTLESS THAT YOU HAVE BEEN MOVING AROUND A LOT MORE THAN USUAL: NOT AT ALL
2. FEELING DOWN, DEPRESSED, IRRITABLE, OR HOPELESS: MORE THAN HALF THE DAYS
7. TROUBLE CONCENTRATING ON THINGS, SUCH AS READING THE NEWSPAPER OR WATCHING TELEVISION: NOT AT ALL
SUM OF ALL RESPONSES TO PHQ9 QUESTIONS 1 AND 2: 4
4. FEELING TIRED OR HAVING LITTLE ENERGY: MORE THAN HALF THE DAYS
6. FEELING BAD ABOUT YOURSELF - OR THAT YOU ARE A FAILURE OR HAVE LET YOURSELF OR YOUR FAMILY DOWN: NEARLY EVERY DAY
SUM OF ALL RESPONSES TO PHQ QUESTIONS 1-9: 10
3. TROUBLE FALLING OR STAYING ASLEEP OR SLEEPING TOO MUCH: SEVERAL DAYS

## 2024-02-05 ASSESSMENT — ENCOUNTER SYMPTOMS
COUGH: 0
ABDOMINAL PAIN: 0
VOMITING: 0
TINGLING: 0
BLOOD IN STOOL: 0
DIZZINESS: 0
SHORTNESS OF BREATH: 0
WHEEZING: 0
CONSTIPATION: 0
DIARRHEA: 0
NAUSEA: 0
HEADACHES: 0
CHILLS: 0
PALPITATIONS: 0
WEIGHT LOSS: 0
FLANK PAIN: 0
FEVER: 0

## 2024-02-05 ASSESSMENT — FIBROSIS 4 INDEX: FIB4 SCORE: 0.53

## 2024-02-05 NOTE — PROGRESS NOTES
"Subjective:     Chief Complaint   Patient presents with    Results    UTI     Burning, frequent urination, cramps and sharp pains since September      HPI: Mattie is a 43-year-old established female patient of Dr. Leann Clayton, who presents today with persisting, stable pelvic pain since September 2023.  Describes this pain as a burning with urination as well as increasing frequency.  Patient also reports a shooting nervelike pain in her vagina as well as lower abdominal cramping.  Patient has an IUD in place so patient does have irregular/infrequent menses.    Denies any aggravating factors.  Patient notes that she notices more when she is laying down in bed \"unbothered by the other things in her life\" she stated.    Patient has seen her primary care provider as well as gynecology for this issue  with extensive laboratory and imaging workup.  Vaginal pathogen swab negative in September/2023 as well as other lab work and urinalysis.  Transvaginal ultrasound 11/2023 unremarkable, IUD in normal position.    Around the same time of her pelvic pain, patient was experiencing right hip pain.  She completed a unilateral right hip x-ray (11/2023) showing no fracture or nerve impingement, diagnosed with bursitis.    Denies dyspareunia, abnormal vaginal bleeding, incontinence, unexplainable weight loss, changes to bowel habits/patterns.    Allergies: Prednisone    Review of Systems   Constitutional:  Negative for chills, fever and weight loss.   Respiratory:  Negative for cough, shortness of breath and wheezing.    Cardiovascular:  Negative for chest pain, palpitations and leg swelling.   Gastrointestinal:  Negative for abdominal pain, blood in stool, constipation, diarrhea, nausea and vomiting.   Genitourinary:  Positive for dysuria and frequency. Negative for flank pain and hematuria.   Skin:  Negative for itching and rash.   Neurological:  Negative for dizziness, tingling and headaches.     Health Maintenance: Deferred " "at this time   Objective:     /70   Pulse 88   Temp 36.7 °C (98.1 °F) (Temporal)   Resp 20   Ht 1.727 m (5' 8\")   Wt 97.1 kg (214 lb 2 oz)   SpO2 97%   Breastfeeding No   BMI 32.56 kg/m²  Body mass index is 32.56 kg/m².     Physical Exam  Vitals reviewed.   Constitutional:       General: She is not in acute distress.     Appearance: Normal appearance. She is not ill-appearing.   Cardiovascular:      Rate and Rhythm: Normal rate and regular rhythm.   Pulmonary:      Effort: Pulmonary effort is normal. No respiratory distress.   Skin:     Coloration: Skin is not jaundiced or pale.   Neurological:      General: No focal deficit present.      Mental Status: She is alert and oriented to person, place, and time.   Psychiatric:         Mood and Affect: Mood normal.         Behavior: Behavior normal.         Thought Content: Thought content normal.         Judgment: Judgment normal.         Assessment and Plan:     The following treatment plan was discussed through shared decision making with the patient:    1. Pain in the groin, unspecified laterality  Acute, complicated.  Due to length of ongoing problem, we will do further workup of lumbar spine to rule out any nerve impingement causing the shooting nervelike vaginal pain.  X-ray of hip negative nerve impingement but was only 1 view in the lower lumbar spine mostly sacrum.  Patient will trial small dose of estradiol vaginal cream to see if this is vaginal atrophy.  Discussed with patient trialing pelvic floor physical therapy to see if she will benefit from pelvic floor evaluation of specialist.  May consider referral to urogynecology in the future.  Last Pap smear was approximately 3 years ago.  Discussed with patient that she may want to follow-up with her gynecologist and discuss if this is something she needs to have done sooner or potentially remove her IUD.  Patient has had an IUD for many years.  IUD currently in place has been there for " approximately 3 years.  - DX-LUMBAR SPINE-2 OR 3 VIEWS; Future  - estradiol (ESTRACE) 0.1 MG/GM vaginal cream; Insert 1 g into the vagina two times a week.  Dispense: 42.5 g; Refill: 0  - Referral to Physical Therapy      Return if symptoms worsen or fail to improve.         Please note that this note was created using dictation with voice recognition software. I have made every reasonable attempt to correct obvious errors, but I expect that there are errors of grammar and possibly content that I did not discover before finalizing the note.    TONEY Chatman  [facility]  [facility]

## 2024-02-05 NOTE — LETTER
[facility]  Leann Clayton M.D.  [address]  [place] [address]  Fax: [phone]   Authorization for Release/Disclosure of   Protected Health Information   Name: MATTIE MONZON : 1980 SSN: [id number]   Address: [address] [place] [address] [place] [address] Phone:    [phone] (home)    I authorize the entity listed below to release/disclose the PHI below to:   [facility]/Leann Clayton M.D. and SARAH Chatman   Provider or Entity Name:     Address   City, State, Zip   Phone:      Fax:     Reason for request: continuity of care   Information to be released:    [  ] LAST COLONOSCOPY,  including any PATH REPORT and follow-up  [  ] LAST FIT/COLOGUARD RESULT [  ] LAST DEXA  [  ] LAST MAMMOGRAM  [  ] LAST PAP  [  ] LAST LABS [  ] RETINA EXAM REPORT  [  ] IMMUNIZATION RECORDS  [  ] Release all info      [  ] Check here and initial the line next to each item to release ALL health information INCLUDING  _____ Care and treatment for drug and / or alcohol abuse  _____ HIV testing, infection status, or AIDS  _____ Genetic Testing    DATES OF SERVICE OR TIME PERIOD TO BE DISCLOSED: _____________  I understand and acknowledge that:  * This Authorization may be revoked at any time by you in writing, except if your health information has already been used or disclosed.  * Your health information that will be used or disclosed as a result of you signing this authorization could be re-disclosed by the recipient. If this occurs, your re-disclosed health information may no longer be protected by State or Federal laws.  * You may refuse to sign this Authorization. Your refusal will not affect your ability to obtain treatment.  * This Authorization becomes effective upon signing and will  on (date) __________.      If no date is indicated, this Authorization will  one (1) year from the signature date.    Name: Mattie Monzon  Signature: Date:   2024     PLEASE FAX REQUESTED  RECORDS BACK TO: [phone]

## 2024-03-18 ENCOUNTER — HOSPITAL ENCOUNTER (OUTPATIENT)
Dept: RADIOLOGY | Facility: MEDICAL CENTER | Age: 44
End: 2024-03-18
Payer: COMMERCIAL

## 2024-03-18 DIAGNOSIS — R10.30 PAIN IN THE GROIN, UNSPECIFIED LATERALITY: ICD-10-CM

## 2024-03-18 PROCEDURE — 72100 X-RAY EXAM L-S SPINE 2/3 VWS: CPT

## 2024-05-31 ENCOUNTER — HOSPITAL ENCOUNTER (EMERGENCY)
Facility: MEDICAL CENTER | Age: 44
End: 2024-05-31
Attending: STUDENT IN AN ORGANIZED HEALTH CARE EDUCATION/TRAINING PROGRAM
Payer: COMMERCIAL

## 2024-05-31 ENCOUNTER — APPOINTMENT (OUTPATIENT)
Dept: RADIOLOGY | Facility: MEDICAL CENTER | Age: 44
End: 2024-05-31
Attending: STUDENT IN AN ORGANIZED HEALTH CARE EDUCATION/TRAINING PROGRAM
Payer: COMMERCIAL

## 2024-05-31 VITALS
HEART RATE: 69 BPM | DIASTOLIC BLOOD PRESSURE: 79 MMHG | TEMPERATURE: 97.4 F | OXYGEN SATURATION: 96 % | BODY MASS INDEX: 30.31 KG/M2 | WEIGHT: 200 LBS | RESPIRATION RATE: 19 BRPM | SYSTOLIC BLOOD PRESSURE: 117 MMHG | HEIGHT: 68 IN

## 2024-05-31 DIAGNOSIS — R20.2 PARESTHESIAS: ICD-10-CM

## 2024-05-31 LAB
ABO GROUP BLD: NORMAL
ALBUMIN SERPL BCP-MCNC: 4.2 G/DL (ref 3.2–4.9)
ALBUMIN/GLOB SERPL: 1.2 G/DL
ALP SERPL-CCNC: 87 U/L (ref 30–99)
ALT SERPL-CCNC: 15 U/L (ref 2–50)
ANION GAP SERPL CALC-SCNC: 14 MMOL/L (ref 7–16)
APTT PPP: 26.7 SEC (ref 24.7–36)
AST SERPL-CCNC: 12 U/L (ref 12–45)
BASOPHILS # BLD AUTO: 0.5 % (ref 0–1.8)
BASOPHILS # BLD: 0.05 K/UL (ref 0–0.12)
BILIRUB SERPL-MCNC: 0.2 MG/DL (ref 0.1–1.5)
BLD GP AB SCN SERPL QL: NORMAL
BUN SERPL-MCNC: 17 MG/DL (ref 8–22)
CALCIUM ALBUM COR SERPL-MCNC: 10.1 MG/DL (ref 8.5–10.5)
CALCIUM SERPL-MCNC: 10.3 MG/DL (ref 8.5–10.5)
CHLORIDE SERPL-SCNC: 105 MMOL/L (ref 96–112)
CO2 SERPL-SCNC: 19 MMOL/L (ref 20–33)
CREAT SERPL-MCNC: 0.62 MG/DL (ref 0.5–1.4)
EKG IMPRESSION: NORMAL
EOSINOPHIL # BLD AUTO: 0.17 K/UL (ref 0–0.51)
EOSINOPHIL NFR BLD: 1.7 % (ref 0–6.9)
ERYTHROCYTE [DISTWIDTH] IN BLOOD BY AUTOMATED COUNT: 46.5 FL (ref 35.9–50)
GFR SERPLBLD CREATININE-BSD FMLA CKD-EPI: 112 ML/MIN/1.73 M 2
GLOBULIN SER CALC-MCNC: 3.5 G/DL (ref 1.9–3.5)
GLUCOSE BLD STRIP.AUTO-MCNC: 93 MG/DL (ref 65–99)
GLUCOSE SERPL-MCNC: 99 MG/DL (ref 65–99)
HCT VFR BLD AUTO: 44.3 % (ref 37–47)
HGB BLD-MCNC: 14.5 G/DL (ref 12–16)
IMM GRANULOCYTES # BLD AUTO: 0.03 K/UL (ref 0–0.11)
IMM GRANULOCYTES NFR BLD AUTO: 0.3 % (ref 0–0.9)
INR PPP: 0.94 (ref 0.87–1.13)
LYMPHOCYTES # BLD AUTO: 4.46 K/UL (ref 1–4.8)
LYMPHOCYTES NFR BLD: 45.1 % (ref 22–41)
MCH RBC QN AUTO: 31.8 PG (ref 27–33)
MCHC RBC AUTO-ENTMCNC: 32.7 G/DL (ref 32.2–35.5)
MCV RBC AUTO: 97.1 FL (ref 81.4–97.8)
MONOCYTES # BLD AUTO: 0.73 K/UL (ref 0–0.85)
MONOCYTES NFR BLD AUTO: 7.4 % (ref 0–13.4)
NEUTROPHILS # BLD AUTO: 4.45 K/UL (ref 1.82–7.42)
NEUTROPHILS NFR BLD: 45 % (ref 44–72)
NRBC # BLD AUTO: 0 K/UL
NRBC BLD-RTO: 0 /100 WBC (ref 0–0.2)
PLATELET # BLD AUTO: 289 K/UL (ref 164–446)
PMV BLD AUTO: 9.6 FL (ref 9–12.9)
POTASSIUM SERPL-SCNC: 3.9 MMOL/L (ref 3.6–5.5)
PROT SERPL-MCNC: 7.7 G/DL (ref 6–8.2)
PROTHROMBIN TIME: 12.6 SEC (ref 12–14.6)
RBC # BLD AUTO: 4.56 M/UL (ref 4.2–5.4)
RH BLD: NORMAL
SODIUM SERPL-SCNC: 138 MMOL/L (ref 135–145)
TROPONIN T SERPL-MCNC: <6 NG/L (ref 6–19)
WBC # BLD AUTO: 9.9 K/UL (ref 4.8–10.8)

## 2024-05-31 PROCEDURE — 99284 EMERGENCY DEPT VISIT MOD MDM: CPT | Performed by: STUDENT IN AN ORGANIZED HEALTH CARE EDUCATION/TRAINING PROGRAM

## 2024-05-31 PROCEDURE — 85730 THROMBOPLASTIN TIME PARTIAL: CPT

## 2024-05-31 PROCEDURE — 85025 COMPLETE CBC W/AUTO DIFF WBC: CPT

## 2024-05-31 PROCEDURE — 36415 COLL VENOUS BLD VENIPUNCTURE: CPT

## 2024-05-31 PROCEDURE — 71045 X-RAY EXAM CHEST 1 VIEW: CPT

## 2024-05-31 PROCEDURE — 70496 CT ANGIOGRAPHY HEAD: CPT

## 2024-05-31 PROCEDURE — 70450 CT HEAD/BRAIN W/O DYE: CPT

## 2024-05-31 PROCEDURE — 700117 HCHG RX CONTRAST REV CODE 255: Performed by: STUDENT IN AN ORGANIZED HEALTH CARE EDUCATION/TRAINING PROGRAM

## 2024-05-31 PROCEDURE — 80053 COMPREHEN METABOLIC PANEL: CPT

## 2024-05-31 PROCEDURE — 93005 ELECTROCARDIOGRAM TRACING: CPT | Performed by: STUDENT IN AN ORGANIZED HEALTH CARE EDUCATION/TRAINING PROGRAM

## 2024-05-31 PROCEDURE — 99285 EMERGENCY DEPT VISIT HI MDM: CPT

## 2024-05-31 PROCEDURE — 86901 BLOOD TYPING SEROLOGIC RH(D): CPT

## 2024-05-31 PROCEDURE — 84484 ASSAY OF TROPONIN QUANT: CPT

## 2024-05-31 PROCEDURE — 94760 N-INVAS EAR/PLS OXIMETRY 1: CPT

## 2024-05-31 PROCEDURE — 82962 GLUCOSE BLOOD TEST: CPT

## 2024-05-31 PROCEDURE — 85610 PROTHROMBIN TIME: CPT

## 2024-05-31 PROCEDURE — 86850 RBC ANTIBODY SCREEN: CPT

## 2024-05-31 PROCEDURE — 0042T CT-CEREBRAL PERFUSION ANALYSIS: CPT

## 2024-05-31 PROCEDURE — 70498 CT ANGIOGRAPHY NECK: CPT

## 2024-05-31 PROCEDURE — 86900 BLOOD TYPING SEROLOGIC ABO: CPT

## 2024-05-31 RX ORDER — DIAZEPAM 5 MG/1
5 TABLET ORAL ONCE
Status: DISCONTINUED | OUTPATIENT
Start: 2024-05-31 | End: 2024-05-31 | Stop reason: HOSPADM

## 2024-05-31 RX ADMIN — IOHEXOL 40 ML: 350 INJECTION, SOLUTION INTRAVENOUS at 03:37

## 2024-05-31 RX ADMIN — IOHEXOL 80 ML: 350 INJECTION, SOLUTION INTRAVENOUS at 03:39

## 2024-05-31 ASSESSMENT — FIBROSIS 4 INDEX: FIB4 SCORE: 0.55

## 2024-05-31 NOTE — ED NOTES
Bedside report received from trauma RN Tasia, assumed care of patient.  POC discussed with patient. Call light within reach, all needs addressed at this time.     Fall risk interventions in place: Patient's personal possessions are with in their safe reach and Keep floor surfaces clean and dry (all applicable per [facility] Fall risk assessment) , fall risk sign in place  Continuous monitoring: Cardiac Leads, Pulse Ox, or Blood Pressure  IVF/IV medications: Not Applicable   Oxygen: Room Air  Bedside sitter: Not Applicable   Isolation: Not Applicable

## 2024-05-31 NOTE — DISCHARGE INSTRUCTIONS
As we discussed you should follow-up with your counselor and primary care provider.  You develop weakness, numbness, change in speech or vision you should return to the emergency department.

## 2024-05-31 NOTE — CONSULTS
Neurology Initial Consult H&P  Neurohospitalist Service, [facility]    Referring Physician: Ian Becker, MEGHAN*    Chief Complaint   Patient presents with    Possible Stroke     BIB  from home. LKW 2330 when pt went to bed. Pt woke up approx 0230 with shortness of breath, dizziness, and profuse sweating. Then developed L sided numbness and weakness. Stroke alert called.        HPI: Mattie Hicks is a 44 y.o. F w/PMHx fo panic attacks who presents from home after onset of feeling unwell, profuse diaphoresis, and numbness and tingling on the left side of the body. Patient reportedly went to bed in usual state of health at 11:30 night prior to admission. Patient woke up at 230, drenched in sweat and was then tossing and turning reporting she didn't feel well. She informed her  of her symptoms and then when they failed to dior and she noticed left sided sensory disturbance described as numbness and tingling in the arm she came to the hospital for evaluation. Patient describes the unwell feeling she is experiencing as similar to her previous panic attacks. Patient is remote and low usage smoker, roughly two cigarettes a day for unknown number of years and quit months ago. Uses alcohol socially, denies illicit drug use. Takes prescribed lorazepam as needed. Denies recent head neck trauma or history of migraines. No other medical condition. Denies family history of early stroke or other conditions.     Review of systems: In addition to what is detailed in the HPI above, all other systems reviewed and are negative.    Past Medical History:    has a past medical history of Acquired hypothyroidism (11/7/2023), Alcohol abuse, Back pain, Chronic RUQ pain (10/10/2018), Depression, Dyslipidemia (11/2/2021), Elevated liver enzymes (11/2/2021), Indigestion, Seizure (HCC), and Vitamin D deficiency (2/22/2022).    She has no past medical history of Addisons disease (HCC), Adrenal disorder  (HCC), Allergy, Anemia, Anginal syndrome (HCC), Arrhythmia, Arthritis, Asthma, At risk for sleep apnea, Blood clotting disorder (HCC), Blood transfusion without reported diagnosis, Bronchitis, Cancer (HCC), Cataract, Congestive heart failure (HCC), COPD (chronic obstructive pulmonary disease) (HCC), Cushings syndrome (HCC), Diabetes (HCC), Diabetic neuropathy (HCC), Dialysis patient (Formerly [facility]), Fall, GERD (gastroesophageal reflux disease), Glaucoma, Goiter, Gynecological disorder, Head ache, Heart murmur, Heart valve disease, Hemorrhagic disorder (HCC), HIV (human immunodeficiency virus infection) (Formerly [facility]), Hypertension, IBD (inflammatory bowel disease), Infectious disease, Jaundice, Meningitis, Migraine, Muscle disorder, Myocardial infarct (Formerly [facility]), Osteoporosis, Pacemaker, Parathyroid disorder (HCC), Pituitary disease (HCC), Pneumonia, Pulmonary emphysema (HCC), Renal disorder, Rheumatic fever, Sickle cell disease (HCC), Stroke (Formerly [facility]), Tuberculosis, Urinary incontinence, or Urinary tract infection.    FHx:  family history includes COPD in her father; Heart Failure in her maternal grandfather; No Known Problems in her mother.    SHx:   reports that she quit smoking about 8 years ago. Her smoking use included cigarettes. She has never used smokeless tobacco. She reports current alcohol use. She reports that she does not use drugs.    Allergies:  Allergies   Allergen Reactions    Prednisone Anxiety       Medications:    Current Facility-Administered Medications:     diazePAM (Valium) tablet 5 mg, 5 mg, Oral, Once, Ian Becker D.O.    Current Outpatient Medications:     estradiol (ESTRACE) 0.1 MG/GM vaginal cream, Insert 1 g into the vagina two times a week., Disp: 42.5 g, Rfl: 0    vitamin D2, Ergocalciferol, (DRISDOL) 1.25 MG (50991 UT) Cap capsule, Take 1 Capsule by mouth every 7 days., Disp: 4 Capsule, Rfl: 2    LORazepam (ATIVAN) 1 MG Tab, Take 1 mg by mouth every day., Disp: , Rfl:     levonorgestrel (MIRENA) 52  mg (20 mcg/24 hr) IUD, 1 Each by Intrauterine route Continuous., Disp: , Rfl:       NEUROLOGICAL EXAM:     MENTAL STATUS: Awake, alert and oriented to person, place, time   LANG/SPEECH: Naming and repetition intact, fluent speech, follows simple commands.    CRANIAL NERVES:  II: Pupils equal and reactive, no VF deficits  III, IV, VI: EOM intact, no gaze preference or deviation, no nystagmus.  V: normal sensation in V1, V2, and V3 segments bilaterally  VII: no asymmetry, no nasolabial fold flattening  VIII: normal hearing to speech  IX, X: normal palatal elevation, no uvular deviation  XI: 5/5 head turn and 5/5 shoulder shrug bilaterally  XII: midline tongue protrusion    MOTOR: Antigravity movement in bilateral upper and lower extremities. Full and symmetric power in proximal and distal muscle groups in bilateral upper and lower extremities    REFLEXES: bilateral flexor plantar response, no clonus  SENSORY: Normal to fine touch in all extremities, no extinction to double sided stimulation (visual & tactile)  COORD: Normal finger to nose, no tremor, no dysmetria  GAIT: Deferred      NIHSS: National Institutes of Health Stroke Scale    [0] 1a:Level of Consciousness    0-alert 1-drowsy   2-stupor   3-coma  [0] 1b:LOC Questions                  0-both  1-one      2-neither  [0] 1c:LOC Commands                   0-both  1-one      2-neither  [0] 2: Best Gaze                     0-nl    1-partial  2-forced  [0] 3: Visual Fields                   0-nl    1-partial  2-complete 3-bilat  [0] 4: Facial Paresis                0-nl    1-minor    2-partial  3-full  MOTOR                       0-nl  [0] 5: Right Arm           1-drift  [0] 6: Left Arm             2-some effort vs gravity  [0] 7: Right Leg           3-no effort vs gravity  [0] 8: Left Leg             4-no movement                             x-untestable  [00] 9: Limb Ataxia                    0-abs   1-1_limb   2-2+_limbs       x-untestable  [0] 10:Sensory                         0-nl    1-partial  2-dense  [0] 11:Best Language/Aphasia         0-nl    1-mild/mod 2-severe   3-mute  [0] 12:Dysarthria                     0-nl    1-mild/mod 2-severe       x-untestable  [0] 13:Neglect/Inattention            0-none  1-partial  2-complete  [0] TOTAL      Objective Data:    Labs:  Lab Results   Component Value Date/Time    PROTHROMBTM 12.6 05/31/2024 03:30 AM    INR 0.94 05/31/2024 03:30 AM      Lab Results   Component Value Date/Time    WBC 9.9 05/31/2024 03:30 AM    RBC 4.56 05/31/2024 03:30 AM    HEMOGLOBIN 14.5 05/31/2024 03:30 AM    HEMATOCRIT 44.3 05/31/2024 03:30 AM    MCV 97.1 05/31/2024 03:30 AM    MCH 31.8 05/31/2024 03:30 AM    MCHC 32.7 05/31/2024 03:30 AM    MPV 9.6 05/31/2024 03:30 AM    NEUTSPOLYS 45.00 05/31/2024 03:30 AM    LYMPHOCYTES 45.10 (H) 05/31/2024 03:30 AM    MONOCYTES 7.40 05/31/2024 03:30 AM    EOSINOPHILS 1.70 05/31/2024 03:30 AM    BASOPHILS 0.50 05/31/2024 03:30 AM      Lab Results   Component Value Date/Time    SODIUM 138 01/16/2024 11:45 AM    POTASSIUM 4.4 01/16/2024 11:45 AM    CHLORIDE 103 01/16/2024 11:45 AM    CO2 24 01/16/2024 11:45 AM    GLUCOSE 82 01/16/2024 11:45 AM    BUN 14 01/16/2024 11:45 AM    CREATININE 0.57 01/16/2024 11:45 AM      Lab Results   Component Value Date/Time    CHOLSTRLTOT 196 01/16/2024 11:45 AM     (H) 01/16/2024 11:45 AM    HDL 43 01/16/2024 11:45 AM    TRIGLYCERIDE 184 (H) 01/16/2024 11:45 AM       Lab Results   Component Value Date/Time    ALKPHOSPHAT 77 01/16/2024 11:45 AM    ASTSGOT 17 01/16/2024 11:45 AM    ALTSGPT 23 01/16/2024 11:45 AM    TBILIRUBIN 0.4 01/16/2024 11:45 AM        Imaging/Testing:    I interpreted and/or reviewed the patient's neuroimaging    CT-CTA NECK WITH & W/O-POST PROCESSING   Final Result         1.  CT angiogram of the neck within normal limits.         CT-CTA HEAD WITH & W/O-POST PROCESS   Final Result         1.  No large vessel occlusion or aneurysm is identified      CT-CEREBRAL  "PERFUSION ANALYSIS   Final Result         1. Cerebral blood flow less than 30% possibly representing completed infarct = 0 mL. Based on distribution of this finding, this is unlikely to represent artifact.      2. T Max more than 6 seconds possibly representing combination of completed infarct and ischemia = 0 mL. Based on the distribution of this finding, this is unlikely to represent artifact.      3. Mismatched volume possibly representing ischemic brain/penumbra= 0 mL      4.  Please note that this cerebral perfusion study and report is Quantitative and targets supratentorial (cerebral) perfusion for evaluation of large vessel territory acute ischemia/infarction. For example, lacunar infarcts, and brainstem/posterior fossa    ischemia/infarction are not evaluated on this study.  Data acquisition is subject to artifacts which can yield non-anatomically plausible perfusion maps which may be due to motion, bolus timing, signal to noise ratio, or other technical factors.    Perfusion map abnormalities which show non-anatomic distributions are likely artifact.   This study is not \"stand-alone\" and should only be utilized for diagnosis, management/treatment in correlation with CT, CTA, and/or MRI and clinical factors.         CT-HEAD W/O   Final Result         1.  No acute intracranial abnormality.         DX-CHEST-PORTABLE (1 VIEW)    (Results Pending)       Assessment and Plan:  44 y.o. F w/PMHx fo panic attacks who presents from home after onset of feeling unwell, profuse diaphoresis, and numbness and tingling on the left side of the body. Acute head imaging negative for hypodesnities, hemorrhage, LVO, focal stenosis or signs of underlying atherosclerotic disease. CTP perfusion negative for changes. Patient's NIH testing was 0 and in the setting of her history of panic attacks with negative head imaging she was not offered IV-thrombolysis. Overall, there is a low suspicion for an acute ischemic process. Would " recommend no further vascular neurologic work up. ERP can focus efforts on management of underlying panic disorder. Vascular neurology will sign off. Please call with questions or concerns.    Problem list:  -- Panic attack    Recommendations:  -- No further work up  -- Will sign off    Christos Schmitt III, MD  ABPN Board Certified in Neurology  Vascular Neurology, [facility]

## 2024-05-31 NOTE — ED PROVIDER NOTES
ED Provider Note    CHIEF COMPLAINT  Chief Complaint   Patient presents with    Possible Stroke     BIB  from home. LKW 2330 when pt went to bed. Pt woke up approx 0230 with shortness of breath, dizziness, and profuse sweating. Then developed L sided numbness and weakness. Stroke alert called.        EXTERNAL RECORDS REVIEWED  Outpatient Notes family medicine visit from 2024 patient seen for pain in the groin    HPI/ROS  LIMITATION TO HISTORY     OUTSIDE HISTORIAN(S):  Significant other patient's  at bedside    Mattie Hicks is a 44 y.o. female who presents anxiety, shortness of breath, dizziness and tingling in her left arm.  Patient says that she woke up feeling anxious, short of breath, dizzy with profuse sweating.  Patient noticed some tingling feeling in her left arm.  Patient denies headache, neck pain, chest pain, ongoing shortness of breath.  Patient denies recent illness.  Patient says she has been stressed at her job.    PAST MEDICAL HISTORY   has a past medical history of Acquired hypothyroidism (2023), Alcohol abuse, Back pain, Chronic RUQ pain (10/10/2018), Depression, Dyslipidemia (2021), Elevated liver enzymes (2021), Indigestion, Seizure (HCC), and Vitamin D deficiency (2022).    SURGICAL HISTORY   has a past surgical history that includes primary c section and gyn surgery.    FAMILY HISTORY  Family History   Problem Relation Age of Onset    No Known Problems Mother     COPD Father     Heart Failure Maternal Grandfather        SOCIAL HISTORY  Social History     Tobacco Use    Smoking status: Former     Current packs/day: 0.00     Types: Cigarettes     Quit date: 2015     Years since quittin.4    Smokeless tobacco: Never   Vaping Use    Vaping status: Never Used   Substance and Sexual Activity    Alcohol use: Yes     Comment: every weekend    Drug use: No    Sexual activity: Yes     Partners: Male     Birth control/protection: I.U.D.       CURRENT  "MEDICATIONS  Home Medications       Reviewed by Tasia Sam R.N. (Registered Nurse) on 05/31/24 at 0358  Med List Status: Partial     Medication Last Dose Status   estradiol (ESTRACE) 0.1 MG/GM vaginal cream  Active   levonorgestrel (MIRENA) 52 mg (20 mcg/24 hr) IUD  Active   LORazepam (ATIVAN) 1 MG Tab  Active   vitamin D2, Ergocalciferol, (DRISDOL) 1.25 MG (27324 UT) Cap capsule  Active                    ALLERGIES  Allergies   Allergen Reactions    Prednisone Anxiety       PHYSICAL EXAM  VITAL SIGNS: /79   Pulse 69   Temp 36.3 °C (97.4 °F) (Temporal)   Resp 19   Ht 1.727 m (5' 8\")   Wt 90.7 kg (200 lb)   SpO2 96%   BMI 30.41 kg/m²    Constitutional: Alert in no apparent distress.  HENT: No signs of trauma, Bilateral external ears normal, Nose normal.   Eyes: Pupils are equal and reactive, Conjunctiva normal, Non-icteric.   Neck: Normal range of motion, No tenderness, Supple, No stridor.   Cardiovascular: Regular rate and rhythm, no murmurs.   Thorax & Lungs: Normal breath sounds, No respiratory distress, No wheezing, No chest tenderness.   Abdomen: Bowel sounds normal, Soft, No tenderness, No masses, No pulsatile masses.   Skin: Warm, Dry, No erythema, No rash.   Back: No bony tenderness, No CVA tenderness.   Extremities: Intact distal pulses, No edema, No tenderness, No cyanosis  Musculoskeletal: Good range of motion in all major joints. No tenderness to palpation or major deformities noted.   Neurologic: Alert , Normal motor function, Normal sensory function, No focal deficits noted.  5/5 strength in all 4 extremities,, no facial asymmetry, visual fields intact, normal speech, normal finger-nose and heel-to-shin slight decrease sensation left forearm compared to the right  Psychiatric: Affect normal, Judgment normal, Mood normal.    EKG/LABS  Labs Reviewed   CBC WITH DIFFERENTIAL - Abnormal; Notable for the following components:       Result Value    Lymphocytes 45.10 (*)     All other " components within normal limits   COMP METABOLIC PANEL - Abnormal; Notable for the following components:    Co2 19 (*)     All other components within normal limits   PROTHROMBIN TIME   APTT   COD (ADULT)   TROPONIN   ESTIMATED GFR   ABO RH CONFIRM   POCT GLUCOSE DEVICE RESULTS     Results for orders placed or performed during the hospital encounter of 24   EKG (NOW)   Result Value Ref Range    Report       [facility] Emergency Dept[facility]    Test Date:  2024  Pt Name:    LYNN MONZON               Department: ER  MRN:        [id number]                      Room:        18  Gender:     Female                       Technician: [id number]  :        [id number]                   Requested By:IAN BECKER  Order #:    [id number]                    Reading MD: Ian Becker    Measurements  Intervals                                Axis  Rate:       63                           P:          47  GA:         134                          QRS:        60  QRSD:       97                           T:          60  QT:         446  QTc:        457    Interpretive Statements  Interpreted by me: Sinus rhythm, rate 63, normal intervals no signs of acute  ischemia  Electronically Signed On 2024 05:35:22 PDT by Ian Becker           I have independently interpreted this EKG    RADIOLOGY/PROCEDURES   I have independently interpreted the diagnostic imaging associated with this visit and am waiting the final reading from the radiologist.   My preliminary interpretation is as follows: No intracranial hemorrhage    Radiologist interpretation:  DX-CHEST-PORTABLE (1 VIEW)   Final Result         1.  No acute cardiopulmonary disease.      CT-CTA NECK WITH & W/O-POST PROCESSING   Final Result         1.  CT angiogram of the neck within normal limits.         CT-CTA HEAD WITH & W/O-POST PROCESS   Final Result         1.  No large vessel occlusion or aneurysm is identified      CT-CEREBRAL  "PERFUSION ANALYSIS   Final Result         1. Cerebral blood flow less than 30% possibly representing completed infarct = 0 mL. Based on distribution of this finding, this is unlikely to represent artifact.      2. T Max more than 6 seconds possibly representing combination of completed infarct and ischemia = 0 mL. Based on the distribution of this finding, this is unlikely to represent artifact.      3. Mismatched volume possibly representing ischemic brain/penumbra= 0 mL      4.  Please note that this cerebral perfusion study and report is Quantitative and targets supratentorial (cerebral) perfusion for evaluation of large vessel territory acute ischemia/infarction. For example, lacunar infarcts, and brainstem/posterior fossa    ischemia/infarction are not evaluated on this study.  Data acquisition is subject to artifacts which can yield non-anatomically plausible perfusion maps which may be due to motion, bolus timing, signal to noise ratio, or other technical factors.    Perfusion map abnormalities which show non-anatomic distributions are likely artifact.   This study is not \"stand-alone\" and should only be utilized for diagnosis, management/treatment in correlation with CT, CTA, and/or MRI and clinical factors.         CT-HEAD W/O   Final Result         1.  No acute intracranial abnormality.             COURSE & MEDICAL DECISION MAKING    ASSESSMENT, COURSE AND PLAN  Care Narrative: On arrival patient was seen as a stroke assessment at charge status.  Patient with nonspecific neurologic symptoms including dizziness, arm tingling did have preceding anxiety, shortness of breath and diaphoresis.  Patient has history of panic attacks.  During initial evaluation patient did report decreased sensation across her left forearm.  Overall low suspicion for acute neurologic process including CVA, TIA or dissection given patient's dizziness subjective left-sided numbness stroke alert was initiated with plan for brain " imaging.  Stroke neurologist came to bedside not able to appreciate any objective neurologic deficits.  CT imaging did not show any acute abnormalities.  Blood work is unremarkable.  ECG without signs of acute ischemia.  On reassessment patient reports resolution of symptoms.  Patient says that she has been quite stressed at her job.  Patient has plans to see her primary care provider and counselor.  At this time given unremarkable brain imaging, blood work and ECG with quick resolution of nonspecific paresthesias believe discharge home with return precautions and outpatient follow-up plan is reasonable.            ADDITIONAL PROBLEMS MANAGED      DISPOSITION AND DISCUSSIONS  I have discussed management of the patient with the following physicians and JASON's: Stroke neurology      FINAL DIAGNOSIS  1. Paresthesias           Electronically signed by: Ian Becker D.O., 5/31/2024 3:35 AM

## 2024-05-31 NOTE — ED TRIAGE NOTES
Mattie Hicks  44 y.o. female  Chief Complaint   Patient presents with    Possible Stroke     BIB  from home. LKW 2330 when pt went to bed. Pt woke up approx 0230 with shortness of breath, dizziness, and profuse sweating. Then developed L sided numbness and weakness. Stroke alert called.      BIB  via wheelchair from home for above. Per ERP pt initial NIH 2 on assessment at charge desk. Pt expedited to CT with this trauma RN. Pt then to [place] [id number], report given to SUDHA Moss at bedside.     Vitals:    05/31/24 0316   BP: (!) 139/90   Pulse: 69   Resp: 15   Temp: 36.3 °C (97.4 °F)   SpO2: 99%

## 2024-05-31 NOTE — ED NOTES
Rounded on pt. Pt resting comfortably in bed with family at bedside. Pt declines anxiety medication at this time, stating that she is starting to feel better. Bed locked and in lowest position. Call light within reach. Respirations equal and unlabored on room air. No further needs at this time.

## 2024-05-31 NOTE — ED NOTES
PIV removed. Written and verbal instructions provided to pt. Pt instructed to follow up with PCP. Pt instructed to return to emergency department for new or worsening symptoms. Pt verbalized understanding of discharge instructions. Pt ambulatory upon discharge with all belongings.

## 2024-07-15 ENCOUNTER — APPOINTMENT (OUTPATIENT)
Dept: MEDICAL GROUP | Facility: PHYSICIAN GROUP | Age: 44
End: 2024-07-15
Payer: COMMERCIAL

## 2024-08-19 ENCOUNTER — APPOINTMENT (OUTPATIENT)
Dept: MEDICAL GROUP | Facility: PHYSICIAN GROUP | Age: 44
End: 2024-08-19
Payer: COMMERCIAL

## 2024-09-20 ENCOUNTER — APPOINTMENT (OUTPATIENT)
Dept: MEDICAL GROUP | Facility: PHYSICIAN GROUP | Age: 44
End: 2024-09-20
Payer: COMMERCIAL

## 2024-09-27 ENCOUNTER — APPOINTMENT (OUTPATIENT)
Dept: MEDICAL GROUP | Facility: PHYSICIAN GROUP | Age: 44
End: 2024-09-27
Payer: COMMERCIAL

## 2024-10-16 ENCOUNTER — APPOINTMENT (OUTPATIENT)
Dept: MEDICAL GROUP | Facility: PHYSICIAN GROUP | Age: 44
End: 2024-10-16
Payer: COMMERCIAL

## 2024-10-18 ENCOUNTER — OFFICE VISIT (OUTPATIENT)
Dept: MEDICAL GROUP | Facility: PHYSICIAN GROUP | Age: 44
End: 2024-10-18
Payer: COMMERCIAL

## 2024-10-18 ENCOUNTER — HOSPITAL ENCOUNTER (OUTPATIENT)
Dept: LAB | Facility: MEDICAL CENTER | Age: 44
End: 2024-10-18
Payer: COMMERCIAL

## 2024-10-18 VITALS
SYSTOLIC BLOOD PRESSURE: 114 MMHG | RESPIRATION RATE: 18 BRPM | DIASTOLIC BLOOD PRESSURE: 60 MMHG | BODY MASS INDEX: 31.67 KG/M2 | WEIGHT: 209 LBS | HEART RATE: 74 BPM | HEIGHT: 68 IN | TEMPERATURE: 98 F | OXYGEN SATURATION: 98 %

## 2024-10-18 DIAGNOSIS — F41.0 PANIC ATTACK: ICD-10-CM

## 2024-10-18 DIAGNOSIS — R74.8 ELEVATED LIVER ENZYMES: ICD-10-CM

## 2024-10-18 DIAGNOSIS — R41.3 MEMORY LOSS: ICD-10-CM

## 2024-10-18 DIAGNOSIS — E03.9 ACQUIRED HYPOTHYROIDISM: ICD-10-CM

## 2024-10-18 DIAGNOSIS — E78.2 MIXED HYPERLIPIDEMIA: ICD-10-CM

## 2024-10-18 LAB
ALBUMIN SERPL BCP-MCNC: 4.6 G/DL (ref 3.2–4.9)
ALBUMIN/GLOB SERPL: 1.5 G/DL
ALP SERPL-CCNC: 82 U/L (ref 30–99)
ALT SERPL-CCNC: 13 U/L (ref 2–50)
ANION GAP SERPL CALC-SCNC: 13 MMOL/L (ref 7–16)
AST SERPL-CCNC: 15 U/L (ref 12–45)
BASOPHILS # BLD AUTO: 0.5 % (ref 0–1.8)
BASOPHILS # BLD: 0.04 K/UL (ref 0–0.12)
BILIRUB SERPL-MCNC: 0.3 MG/DL (ref 0.1–1.5)
BUN SERPL-MCNC: 14 MG/DL (ref 8–22)
CALCIUM ALBUM COR SERPL-MCNC: 9.7 MG/DL (ref 8.5–10.5)
CALCIUM SERPL-MCNC: 10.2 MG/DL (ref 8.5–10.5)
CHLORIDE SERPL-SCNC: 103 MMOL/L (ref 96–112)
CO2 SERPL-SCNC: 22 MMOL/L (ref 20–33)
CREAT SERPL-MCNC: 0.52 MG/DL (ref 0.5–1.4)
EOSINOPHIL # BLD AUTO: 0.11 K/UL (ref 0–0.51)
EOSINOPHIL NFR BLD: 1.3 % (ref 0–6.9)
ERYTHROCYTE [DISTWIDTH] IN BLOOD BY AUTOMATED COUNT: 45.6 FL (ref 35.9–50)
EST. AVERAGE GLUCOSE BLD GHB EST-MCNC: 120 MG/DL
GFR SERPLBLD CREATININE-BSD FMLA CKD-EPI: 116 ML/MIN/1.73 M 2
GLOBULIN SER CALC-MCNC: 3 G/DL (ref 1.9–3.5)
GLUCOSE SERPL-MCNC: 89 MG/DL (ref 65–99)
HBA1C MFR BLD: 5.8 % (ref 4–5.6)
HCT VFR BLD AUTO: 42.6 % (ref 37–47)
HGB BLD-MCNC: 13.6 G/DL (ref 12–16)
IMM GRANULOCYTES # BLD AUTO: 0.03 K/UL (ref 0–0.11)
IMM GRANULOCYTES NFR BLD AUTO: 0.4 % (ref 0–0.9)
LYMPHOCYTES # BLD AUTO: 2.9 K/UL (ref 1–4.8)
LYMPHOCYTES NFR BLD: 35.2 % (ref 22–41)
MCH RBC QN AUTO: 31.1 PG (ref 27–33)
MCHC RBC AUTO-ENTMCNC: 31.9 G/DL (ref 32.2–35.5)
MCV RBC AUTO: 97.3 FL (ref 81.4–97.8)
MONOCYTES # BLD AUTO: 0.68 K/UL (ref 0–0.85)
MONOCYTES NFR BLD AUTO: 8.3 % (ref 0–13.4)
NEUTROPHILS # BLD AUTO: 4.47 K/UL (ref 1.82–7.42)
NEUTROPHILS NFR BLD: 54.3 % (ref 44–72)
NRBC # BLD AUTO: 0 K/UL
NRBC BLD-RTO: 0 /100 WBC (ref 0–0.2)
PLATELET # BLD AUTO: 282 K/UL (ref 164–446)
PMV BLD AUTO: 10.4 FL (ref 9–12.9)
POTASSIUM SERPL-SCNC: 4.3 MMOL/L (ref 3.6–5.5)
PROT SERPL-MCNC: 7.6 G/DL (ref 6–8.2)
RBC # BLD AUTO: 4.38 M/UL (ref 4.2–5.4)
SODIUM SERPL-SCNC: 138 MMOL/L (ref 135–145)
T3 SERPL-MCNC: 98.2 NG/DL (ref 60–181)
T4 FREE SERPL-MCNC: 1.08 NG/DL (ref 0.93–1.7)
TSH SERPL-ACNC: 1.37 UIU/ML (ref 0.35–5.5)
WBC # BLD AUTO: 8.2 K/UL (ref 4.8–10.8)

## 2024-10-18 PROCEDURE — 3074F SYST BP LT 130 MM HG: CPT

## 2024-10-18 PROCEDURE — 84439 ASSAY OF FREE THYROXINE: CPT

## 2024-10-18 PROCEDURE — 80053 COMPREHEN METABOLIC PANEL: CPT

## 2024-10-18 PROCEDURE — 99213 OFFICE O/P EST LOW 20 MIN: CPT

## 2024-10-18 PROCEDURE — 85025 COMPLETE CBC W/AUTO DIFF WBC: CPT

## 2024-10-18 PROCEDURE — 84443 ASSAY THYROID STIM HORMONE: CPT

## 2024-10-18 PROCEDURE — 36415 COLL VENOUS BLD VENIPUNCTURE: CPT

## 2024-10-18 PROCEDURE — 84480 ASSAY TRIIODOTHYRONINE (T3): CPT

## 2024-10-18 PROCEDURE — 3078F DIAST BP <80 MM HG: CPT

## 2024-10-18 PROCEDURE — 83036 HEMOGLOBIN GLYCOSYLATED A1C: CPT

## 2024-10-18 RX ORDER — PROPRANOLOL HYDROCHLORIDE 10 MG/1
TABLET ORAL
COMMUNITY
Start: 2024-09-26 | End: 2024-10-18

## 2024-10-18 ASSESSMENT — MONTREAL COGNITIVE ASSESSMENT (MOCA)
ORIENTATION SUBSCORE: 6/6
ADD 1 POINT IF LESS THAN OR EQUAL TO 12 YR EDUCATION LEVEL: 1
3. DRAW A CLOCK: CONTOUR, NUMBERS, HANDS: 3/3
10. [FLUENCY] NAME WORDS STARTING WITH DESIGNATED LETTER: 1/1
WHAT IS THE TOTAL SCORE (OUT OF 30): 31
DELAYED RECALL SUBSCORE: 5/5
5. MEMORY TRIALS: SECOND TRIAL;FIRST TRIAL
1. ALTERNATING TRAIL MAKING: 1/1
11. FOR EACH PAIR OF WORDS, WHAT CATEGORY DO THEY BELONG TO (OUT OF 2): 2/2
4. NAME EACH OF THE THREE ANIMALS SHOWN: 3/3
6. READ LIST OF DIGITS [FORWARD/BACKWARD]: 2/2
2. COPY DRAWING: 1/1
7. [VIGILENCE] TAP WHEN HEARING DESIGNATED LETTER: 1/1
8. SERIAL SUBTRACTION OF 7S: 4 OR 5/5
9. REPEAT EACH SENTENCE: 2/2

## 2024-10-18 ASSESSMENT — FIBROSIS 4 INDEX: FIB4 SCORE: 0.48

## 2024-10-30 DIAGNOSIS — R41.3 MEMORY LOSS: ICD-10-CM

## 2024-11-08 ENCOUNTER — APPOINTMENT (OUTPATIENT)
Dept: MEDICAL GROUP | Facility: PHYSICIAN GROUP | Age: 44
End: 2024-11-08
Payer: COMMERCIAL

## 2024-11-08 ENCOUNTER — TELEPHONE (OUTPATIENT)
Dept: HEALTH INFORMATION MANAGEMENT | Facility: OTHER | Age: 44
End: 2024-11-08

## 2024-12-06 ENCOUNTER — APPOINTMENT (OUTPATIENT)
Dept: MEDICAL GROUP | Facility: PHYSICIAN GROUP | Age: 44
End: 2024-12-06
Payer: COMMERCIAL

## 2024-12-17 ENCOUNTER — OFFICE VISIT (OUTPATIENT)
Dept: URGENT CARE | Facility: CLINIC | Age: 44
End: 2024-12-17
Payer: COMMERCIAL

## 2024-12-17 ENCOUNTER — APPOINTMENT (OUTPATIENT)
Dept: URGENT CARE | Facility: CLINIC | Age: 44
End: 2024-12-17
Payer: COMMERCIAL

## 2024-12-17 VITALS
HEIGHT: 68 IN | TEMPERATURE: 97.6 F | OXYGEN SATURATION: 99 % | BODY MASS INDEX: 31.37 KG/M2 | DIASTOLIC BLOOD PRESSURE: 68 MMHG | WEIGHT: 207 LBS | SYSTOLIC BLOOD PRESSURE: 112 MMHG | RESPIRATION RATE: 13 BRPM | HEART RATE: 74 BPM

## 2024-12-17 DIAGNOSIS — B02.9 HERPES ZOSTER WITHOUT COMPLICATION: ICD-10-CM

## 2024-12-17 PROCEDURE — 3078F DIAST BP <80 MM HG: CPT

## 2024-12-17 PROCEDURE — 3074F SYST BP LT 130 MM HG: CPT

## 2024-12-17 PROCEDURE — 99214 OFFICE O/P EST MOD 30 MIN: CPT

## 2024-12-17 RX ORDER — VALACYCLOVIR HYDROCHLORIDE 1 G/1
1000 TABLET, FILM COATED ORAL 3 TIMES DAILY
Qty: 21 TABLET | Refills: 0 | Status: SHIPPED | OUTPATIENT
Start: 2024-12-17 | End: 2024-12-24

## 2024-12-17 ASSESSMENT — ENCOUNTER SYMPTOMS
DOUBLE VISION: 0
BLURRED VISION: 0
FEVER: 0
CHILLS: 0

## 2024-12-17 ASSESSMENT — FIBROSIS 4 INDEX: FIB4 SCORE: 0.66

## 2024-12-17 NOTE — PROGRESS NOTES
CHIEF COMPLAINT  Chief Complaint   Patient presents with    Herpes Zoster     Patient is here today for possible shingles. Patient states headache, fatigue, burning and numbness on arms and legs     Subjective:   Mattie Hicks is a 44 y.o. female who presents to urgent care with concerns for herpes zoster.  She reports symptoms of headache fatigue and burning-like numbness to her forehead and face.  She does report noticing a focal patch of erythema to her forehead yesterday, which subsequently developed into vesicles like lesions this morning.  She denies any fevers.  Denies any lesions to eye.  No acute vision changes.  She does report history of chickenpox when she was younger.  No other pertinent past medical history.      Review of Systems   Constitutional:  Negative for chills and fever.   Eyes:  Negative for blurred vision and double vision.   Skin:  Positive for rash.       PAST MEDICAL HISTORY  Patient Active Problem List    Diagnosis Date Noted    Acquired hypothyroidism 11/07/2023    Vitamin D deficiency 02/22/2022    Alcohol dependence (HCC) 11/04/2021    Primary insomnia 11/02/2021    Elevated MCV 11/02/2021    Mixed hyperlipidemia 11/02/2021    Elevated liver enzymes 11/02/2021    Fatty liver 11/02/2021    KATTY (generalized anxiety disorder) 05/01/2020    MDD (major depressive disorder) 08/12/2019       SURGICAL HISTORY   has a past surgical history that includes primary c section and gyn surgery.    ALLERGIES  Allergies   Allergen Reactions    Prednisone Anxiety       CURRENT MEDICATIONS  Home Medications       Reviewed by Noah Feng'carlos (Medical Assistant) on 12/17/24 at 0823  Med List Status: <None>     Medication Last Dose Status   levonorgestrel (MIRENA) 52 mg (20 mcg/24 hr) IUD Taking Active   LORazepam (ATIVAN) 1 MG Tab PRN Active   sertraline (ZOLOFT) 50 MG Tab Taking Active   vitamin D2, Ergocalciferol, (DRISDOL) 1.25 MG (24288 UT) Cap capsule PRN Active               "      SOCIAL HISTORY  Social History     Tobacco Use    Smoking status: Former     Current packs/day: 0.00     Types: Cigarettes     Quit date: 2015     Years since quittin.9    Smokeless tobacco: Never   Vaping Use    Vaping status: Never Used   Substance and Sexual Activity    Alcohol use: Yes     Comment: every weekend    Drug use: No    Sexual activity: Yes     Partners: Male     Birth control/protection: I.U.D.       FAMILY HISTORY  Family History   Problem Relation Age of Onset    No Known Problems Mother     COPD Father     Heart Failure Maternal Grandfather          Medications, Allergies, and current problem list reviewed today in Epic.     Objective:     /68   Pulse 74   Temp 36.4 °C (97.6 °F) (Temporal)   Resp 13   Ht 1.727 m (5' 8\")   Wt 93.9 kg (207 lb)   SpO2 99%     Physical Exam  Vitals reviewed.   Constitutional:       General: She is not in acute distress.     Appearance: Normal appearance. She is not ill-appearing or toxic-appearing.   HENT:      Head: Normocephalic.        Comments: Focal erythema to right side of forehead with cluster of vesicular lesions.      Nose: Nose normal.      Mouth/Throat:      Mouth: Mucous membranes are moist.      Pharynx: Oropharynx is clear.   Eyes:      General: Lids are normal.      Comments: No Lesions noted around eye   Cardiovascular:      Rate and Rhythm: Normal rate and regular rhythm.      Heart sounds: Normal heart sounds.   Pulmonary:      Effort: Pulmonary effort is normal.      Breath sounds: Normal breath sounds.   Musculoskeletal:      Cervical back: Normal range of motion.   Skin:     General: Skin is warm.      Capillary Refill: Capillary refill takes less than 2 seconds.   Neurological:      General: No focal deficit present.      Mental Status: She is alert.   Psychiatric:         Mood and Affect: Mood normal.         Assessment/Plan:     Diagnosis and associated orders:     1. Herpes zoster without complication  valacyclovir " (VALTREX) 1 GM Tab         Comments/MDM:     Exam today most consistent with VZV. No evidence of secondary soft tissue infection. Etiology and expected course of illness discussed with patient.  Patient started on Valtrex.  May apply capsaicin and take tylenol or motrin as needed to help with pain.    Follow-up with primary care  Advised to follow-up immediately to ER with any symptoms of acute vision changes or lesions began to develop closer to eye.  Return to clinic if symptoms worsen or fail to resolve.         Differential diagnosis, natural history, supportive care, and indications for immediate follow-up discussed.    Advised the patient to follow-up with the primary care physician for recheck, reevaluation, and consideration of further management.    Please note that this dictation was created using voice recognition software. I have made a reasonable attempt to correct obvious errors, but I expect that there are errors of grammar and possibly content that I did not discover before finalizing the note.    This note was electronically signed by SARAH York

## 2024-12-17 NOTE — LETTER
December 17, 2024    To Whom It May Concern:         This is confirmation that Mattie Hicks attended her scheduled appointment with SARAH York on 12/17/24. Please excuse for the remainder of this week. Thank you for making accommodations for rest and recovery.          If you have any questions please do not hesitate to call me at the phone number listed below.    Sincerely,          SHERIDAN [email or web address].  [phone]

## 2025-01-15 ENCOUNTER — OFFICE VISIT (OUTPATIENT)
Dept: URGENT CARE | Facility: PHYSICIAN GROUP | Age: 45
End: 2025-01-15
Payer: COMMERCIAL

## 2025-01-15 VITALS
RESPIRATION RATE: 18 BRPM | HEIGHT: 68 IN | WEIGHT: 208 LBS | DIASTOLIC BLOOD PRESSURE: 66 MMHG | TEMPERATURE: 98.8 F | HEART RATE: 84 BPM | OXYGEN SATURATION: 98 % | SYSTOLIC BLOOD PRESSURE: 114 MMHG | BODY MASS INDEX: 31.52 KG/M2

## 2025-01-15 DIAGNOSIS — J22 LOWER RESPIRATORY TRACT INFECTION: ICD-10-CM

## 2025-01-15 DIAGNOSIS — R05.2 SUBACUTE COUGH: ICD-10-CM

## 2025-01-15 DIAGNOSIS — R06.02 SHORTNESS OF BREATH: ICD-10-CM

## 2025-01-15 PROCEDURE — 94640 AIRWAY INHALATION TREATMENT: CPT | Performed by: NURSE PRACTITIONER

## 2025-01-15 PROCEDURE — 99214 OFFICE O/P EST MOD 30 MIN: CPT | Performed by: NURSE PRACTITIONER

## 2025-01-15 PROCEDURE — 3078F DIAST BP <80 MM HG: CPT | Performed by: NURSE PRACTITIONER

## 2025-01-15 PROCEDURE — 3074F SYST BP LT 130 MM HG: CPT | Performed by: NURSE PRACTITIONER

## 2025-01-15 RX ORDER — METHYLPREDNISOLONE 4 MG/1
TABLET ORAL
Qty: 21 TABLET | Refills: 0 | Status: SHIPPED | OUTPATIENT
Start: 2025-01-15

## 2025-01-15 RX ORDER — ALBUTEROL SULFATE 90 UG/1
2 INHALANT RESPIRATORY (INHALATION) EVERY 6 HOURS PRN
Qty: 8.5 G | Refills: 0 | Status: SHIPPED | OUTPATIENT
Start: 2025-01-15

## 2025-01-15 RX ORDER — SERTRALINE HYDROCHLORIDE 100 MG/1
100 TABLET, FILM COATED ORAL EVERY MORNING
COMMUNITY
Start: 2024-11-03

## 2025-01-15 RX ORDER — IPRATROPIUM BROMIDE AND ALBUTEROL SULFATE 2.5; .5 MG/3ML; MG/3ML
3 SOLUTION RESPIRATORY (INHALATION) ONCE
Status: COMPLETED | OUTPATIENT
Start: 2025-01-15 | End: 2025-01-15

## 2025-01-15 RX ADMIN — IPRATROPIUM BROMIDE AND ALBUTEROL SULFATE 3 ML: 2.5; .5 SOLUTION RESPIRATORY (INHALATION) at 19:52

## 2025-01-15 ASSESSMENT — FIBROSIS 4 INDEX: FIB4 SCORE: 0.66

## 2025-01-18 NOTE — PROGRESS NOTES
Verbal consent was acquired by the patient to use [employer] ambient listening note generation during this visit          Chief Complaint   Patient presents with    URI     Woke up on Sunday and had trouble breathing, resolved since then but earlier at work she felt like she needed to take deeper breaths. Has been having nasal congestion, deep cough, fatigue, and green phlegm.           History of Present Illness  The patient is a 44-year-old female who presents for evaluation of a sore throat and difficulty breathing.    She reported the onset of a sore throat on Sunday morning, which progressively worsened throughout the day. By nightfall, she experienced difficulty breathing, necessitating deep inhalations. Although her condition improved the following day, she has been experiencing intermittent episodes of dyspnea over the past few hours, characterized by a sensation of inadequate oxygen intake. She also reported a non-productive, dry cough with associated pain. Her  is currently ill, but he has not undergone any diagnostic testing. She has no prior history of respiratory disorders such as asthma or lung disease.         ROS:    No severe shortness of breath   No cardiac like chest pain, as discussed   As otherwise stated in HPI    Medical/SX/ Social History:  Reviewed per chart    Pertinent Medications:    Current Outpatient Medications on File Prior to Visit   Medication Sig Dispense Refill    sertraline (ZOLOFT) 100 MG Tab Take 100 mg by mouth every morning.      LORazepam (ATIVAN) 1 MG Tab Take 1 mg by mouth every day.      levonorgestrel (MIRENA) 52 mg (20 mcg/24 hr) IUD 1 Each by Intrauterine route Continuous.      sertraline (ZOLOFT) 50 MG Tab Take 50 mg by mouth every morning. (Patient not taking: Reported on 1/15/2025)      vitamin D2, Ergocalciferol, (DRISDOL) 1.25 MG (09341 UT) Cap capsule Take 1 Capsule by mouth every 7 days. (Patient not taking: Reported on 1/15/2025) 4 Capsule 2     No  current facility-administered medications on file prior to visit.        Allergies:    Prednisone     Problem list, medications, and allergies reviewed by myself today in Epic     Physical Exam:    Vitals:    01/15/25 1903   BP: 114/66   Pulse: 84   Resp: 18   Temp: 37.1 °C (98.8 °F)   SpO2: 98%             Physical Exam  Vitals and nursing note reviewed.   Constitutional:       General: She is not in acute distress.     Appearance: Normal appearance. She is ill-appearing. She is not toxic-appearing.   HENT:      Head: Normocephalic and atraumatic.      Right Ear: Tympanic membrane, ear canal and external ear normal.      Left Ear: Tympanic membrane, ear canal and external ear normal.      Nose: Nose normal.      Mouth/Throat:      Mouth: Mucous membranes are moist.      Pharynx: Oropharynx is clear. Uvula midline. Posterior oropharyngeal erythema present. No pharyngeal swelling, oropharyngeal exudate, uvula swelling or postnasal drip.   Eyes:      Extraocular Movements: Extraocular movements intact.      Conjunctiva/sclera: Conjunctivae normal.      Pupils: Pupils are equal, round, and reactive to light.   Cardiovascular:      Rate and Rhythm: Normal rate and regular rhythm.      Pulses: Normal pulses.      Heart sounds: Normal heart sounds.   Pulmonary:      Effort: Pulmonary effort is normal.      Breath sounds: Examination of the right-upper field reveals decreased breath sounds. Examination of the left-upper field reveals decreased breath sounds. Examination of the right-middle field reveals decreased breath sounds. Decreased breath sounds and wheezing present.   Musculoskeletal:         General: Normal range of motion.      Cervical back: Normal range of motion and neck supple.   Skin:     General: Skin is warm.      Capillary Refill: Capillary refill takes less than 2 seconds.   Neurological:      General: No focal deficit present.      Mental Status: She is alert and oriented to person, place, and time.             Medical Decision making and plan :  I personally reviewed prior external notes and test results pertinent to today's visit. Pt is clinically stable at today's acute urgent care visit.  Patient appears nontoxic with no acute distress noted. Appropriate for outpatient care at this time.    Pleasant 44 y.o. female presented clinic with:     Assessment & Plan  1.  LRTI.  Initially patient was given a Duoneb with good improvement in her work of breathing and her wheezing.  Due to duration of symptoms, dyspnea and failure of OTC therapies, antibiotic was written for treatment of bacterial etiology for lower respiratory tract infection. Continue OTC supportive therapies. Flonase, OTC allergy meds, avoid night time dairy, increased fluids and humidification recommended. Patient given precautionary s/sx that mandate immediate follow up and evaluation in the ED. Advised of risks of not doing so. DDX, Supportive care, and indications for immediate follow-up discussed with patient.  Instructed to return to clinic or nearest emergency department if we are not available for any change in condition, further concerns, or worsening of symptoms.    The patient demonstrated a good understanding and agreed with the treatment plan        Shared decision-making was utilized with patient for treatment plan. Medication discussed included indication for use and the potential benefits and side effects. Education was provided regarding the aforementioned assessments.  Differential Diagnosis, natural history, and supportive care discussed. All of the patient's questions were answered to their satisfaction at the time of discharge. Patient was encouraged to monitor symptoms closely. Those signs and symptoms which would warrant concern and mandate seeking a higher level of service through the emergency department discussed at length.  Patient stated agreement and understanding of this plan of care.    Disposition:  Home in stable condition  with ER precautions.    My total time spent caring for the patient on the day of the encounter was 35 minutes.   This does not include time spent on separately billable procedures/tests.    Voice Recognition Disclaimer:  Portions of this document were created using voice recognition software and [employer] technology provided by [employer]. The software does have a chance of producing errors of grammar and possibly content. I have made every reasonable attempt to correct obvious errors, but there may be errors of grammar and possibly content that I did not discover before finalizing the  documentation.    DEBORAH Borden.

## 2025-02-14 ENCOUNTER — OFFICE VISIT (OUTPATIENT)
Dept: NEUROLOGY | Facility: MEDICAL CENTER | Age: 45
End: 2025-02-14
Attending: PSYCHIATRY & NEUROLOGY
Payer: COMMERCIAL

## 2025-02-14 VITALS
HEIGHT: 68 IN | TEMPERATURE: 98.1 F | SYSTOLIC BLOOD PRESSURE: 122 MMHG | BODY MASS INDEX: 31.34 KG/M2 | OXYGEN SATURATION: 96 % | DIASTOLIC BLOOD PRESSURE: 78 MMHG | WEIGHT: 206.79 LBS | RESPIRATION RATE: 16 BRPM | HEART RATE: 68 BPM

## 2025-02-14 DIAGNOSIS — R41.89 COGNITIVE IMPAIRMENT: Primary | ICD-10-CM

## 2025-02-14 PROCEDURE — 99417 PROLNG OP E/M EACH 15 MIN: CPT | Performed by: PSYCHIATRY & NEUROLOGY

## 2025-02-14 PROCEDURE — 99205 OFFICE O/P NEW HI 60 MIN: CPT | Performed by: PSYCHIATRY & NEUROLOGY

## 2025-02-14 PROCEDURE — 3074F SYST BP LT 130 MM HG: CPT | Performed by: PSYCHIATRY & NEUROLOGY

## 2025-02-14 PROCEDURE — 99211 OFF/OP EST MAY X REQ PHY/QHP: CPT | Performed by: PSYCHIATRY & NEUROLOGY

## 2025-02-14 PROCEDURE — 3078F DIAST BP <80 MM HG: CPT | Performed by: PSYCHIATRY & NEUROLOGY

## 2025-02-14 RX ORDER — BUSPIRONE HYDROCHLORIDE 5 MG/1
5 TABLET ORAL 2 TIMES DAILY
COMMUNITY
Start: 2025-01-18 | End: 2025-02-14

## 2025-02-14 RX ORDER — TRAZODONE HYDROCHLORIDE 50 MG/1
TABLET ORAL
COMMUNITY
Start: 2025-01-28

## 2025-02-14 ASSESSMENT — MONTREAL COGNITIVE ASSESSMENT (MOCA)
11. FOR EACH PAIR OF WORDS, WHAT CATEGORY DO THEY BELONG TO (OUT OF 2): 2/2
9. REPEAT EACH SENTENCE: 1/2
6. READ LIST OF DIGITS [FORWARD/BACKWARD]: 2/2
DELAYED RECALL SUBSCORE: 1/5
CATEGORY CUE (IF APPLICABLE): 1
3. DRAW A CLOCK: CONTOUR, NUMBERS, HANDS: 2/3
WHAT IS THE TOTAL SCORE (OUT OF 30): 22
4. NAME EACH OF THE THREE ANIMALS SHOWN: 3/3
8. SERIAL SUBTRACTION OF 7S: 4 OR 5/5
2. COPY DRAWING: 0/1
7. [VIGILENCE] TAP WHEN HEARING DESIGNATED LETTER: 0/1
WHAT IS THE VERSION OF MOCA ADMINISTERED: 8.1
10. [FLUENCY] NAME WORDS STARTING WITH DESIGNATED LETTER: 1/1
CATEGORY CUE (IF APPLICABLE): 3
1. ALTERNATING TRAIL MAKING: 1/1
ORIENTATION SUBSCORE: 6/6

## 2025-02-14 ASSESSMENT — ENCOUNTER SYMPTOMS
FALLS: 0
SEIZURES: 0
FOCAL WEAKNESS: 0
DEPRESSION: 0
TINGLING: 0
INSOMNIA: 0
MEMORY LOSS: 1
LOSS OF CONSCIOUSNESS: 0
HALLUCINATIONS: 0
NERVOUS/ANXIOUS: 1

## 2025-02-14 ASSESSMENT — FIBROSIS 4 INDEX: FIB4 SCORE: 0.66

## 2025-02-14 ASSESSMENT — LIFESTYLE VARIABLES: SUBSTANCE_ABUSE: 1

## 2025-02-15 NOTE — ASSESSMENT & PLAN NOTE
She does show signs of cognitive impairment but at a very young age, we would be looking for atypical causes.  The anxiety she suffers from, coupled with panic episodes in the past, are probably still playing a bit of a role with all of this, fortunately she is also stopped drinking which is also complicating the issues.  Still, more appropriate and directed workup is warranted.  She lacks the signs or stigmata of something like Parkinson's disease, Lewy Body Disease, etc.  Early onset might be consistent with frontotemporal disorders, but quite often there is a family history, of which she has none.  Its absence does not preclude anything.  History and exam findings are not consistent with NPH, but other structural pathologies do need to be ruled out.  The more prolonged and protracted history of symptom progression is against this being something autoimmune or paraneoplastic in nature, though the still need to be assessed.    For now I encouraged her to follow through with a therapist to make sure things remain stable and so that her anxiety and panic episodes do not get out of control.  She was encouraged to remain alcohol free.  MRI of the brain, CT PET of the brain, EEG, SPEP, B12 and folate levels as well as paraneoplastic markers will be drawn.    For now there is no need for symptomatic relief, she is functioning well creating structure, routine, etc.  She was encouraged to remain physically active, she was encouraged also to be social with family and friends around whom she is not so self-conscious.  She is maintaining good sleep hygiene, there are no inherent disorders that need to be directly addressed, she is getting some type of cognitive stimulation at work.  Dietary habit is being maintained, we talked about Mediterranean style eating habits as being brain friendly.  We will follow-up once the testing is complete.    Orders:    MR-BRAIN-W/O; Future    CT-PET METABOLIC EVALUATION - BRAIN; Future     Referral to Neurodiagnostics (EEG,EP,EMG/NCS/DBS)    VIT B12,  FOLIC ACID    Sed Rate; Future    SPEP W/REFLEX TO SHERIDAN MCCARTHY, G, M; Future    Paraneoplastic Autoantibody Eval (Ser); Future

## 2025-02-15 NOTE — PROGRESS NOTES
Vasyl Hicks is a 44 y.o. female who presents for consultation, from the office of Dylan Smith DNP, with a history of progressive cognitive decline over the last 1-2 years.  She presents today alone.    YESICA Phelps is a very pleasant 44-year-old right-handed woman who began to notice difficulties with memory beginning about 1-2 years ago.  There was no singular event or time that she noted the symptoms.  She simply began to notice that she was having difficulty finding words.  Initially it was rare, she had to find the word on her own using synonyms but recognized a mistake and could not find the right word eventually.  More and more often others are having to now help her.  She becomes more easily confused in complex conversations as well as when completing complex tasks or when multitasking.  She finds herself becoming much more easily overwhelmed as a result.  She can complete single tasks if not distracted, does so without issue, but the distractibility is also becoming more noticeable.    With her job she is requiring more reminders from others, even when doing routine tasks.  She finds that using routines helps.  She has to write everything down as soon as it occurs otherwise it is forgotten.  With notes and hand she does retain significance.  Learning new tasks has slowed down.    She is finding herself having become more irritable, things that used to be water off her back may get on her skin more easily.  In general her behavior has not been distorted.  She denies issues of delirium with paranoid hallucinations, she has never had sleep distortions with RBD or JOSHUA symptomatology.  Her mood is stable, she denies sustained episodes of depression with SI or HI.    She is independent with her ADLs, there have been no problems with bowel or bladder control.  She still does her medications as well as completes her finances without issue.  Gait is normal.    Last year, as all of these  cognitive symptoms continued to fester, her father passed away, her anxiety level increased noticeably.  With this so did all of her cognitive symptoms.  She began to suffer from regular panic episodes.  She has a history of heavy alcohol overuse, she stopped drinking in the last couple of months.  She also caught up with a therapist who placed her on Zoloft which has helped stabilize the anxiety that she has.  She has always used Ativan on an as-needed basis for panic symptoms.  She states she has not had to use as much of this.    MRI imaging of the brain was done 5 years ago in  when she did suffer from an episode of dizziness and syncope.  I reviewed the images, these were completely normal.  A repeat study from May, 2020 also was normal.  During this time, blood work revealed an elevated B12 3221, folate 13.3, vitamin D 30, inflammatory markers and thyroid studies also were unremarkable.  She has not been treated.    Medical history is remarkable for anxiety which has been present since her 20s.  She has no history of thought disorder or bipolar disease.  There is no history of diagnosed neurodegenerative disease, CVA, seizure, MS, migraine, liver or kidney disease, hypertension, diabetes, CAD, PVD, pulmonary disease, hypertension, diabetes, autoimmune disease or blood dyscrasia.    There is no surgical history of note from the standpoint.    Her menses are regular, she is on BCP.    No one in the family has a history of dementia, her father  with a history of heart disease, her mother is still alive.  None of her siblings have ever suffered from dementia or progressive cognitive decline.    She has a history of heavy alcohol overuse, she states she has been clean for the last several months.  She stopped tobacco use about 1.5 years ago.  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  She has a bachelor's degree.    She is on albuterol inhaler as needed, trazodone 50 mg nightly, melena, Ativan  "as needed and Zoloft 100 mg daily.    Review of Systems   Constitutional:  Negative for malaise/fatigue.   Musculoskeletal:  Negative for falls.   Neurological:  Negative for tingling, focal weakness, seizures and loss of consciousness.   Psychiatric/Behavioral:  Positive for memory loss and substance abuse. Negative for depression, hallucinations and suicidal ideas. The patient is nervous/anxious. The patient does not have insomnia.    All other systems reviewed and are negative.    Objective     /78 (BP Location: Right arm, Patient Position: Sitting, BP Cuff Size: Adult)   Pulse 68   Temp 36.7 °C (98.1 °F) (Temporal)   Resp 16   Ht 1.727 m (5' 8\")   Wt 93.8 kg (206 lb 12.7 oz)   SpO2 96%   BMI 31.44 kg/m²      Physical Exam    She appears no acute distress.  She is clean and appropriately dressed, she is quite cooperative.  Her vital signs are stable.  There is no malar rash, jaw or temporal tenderness, jaw claudication, or allodynia.  The neck is supple, range of motion is full.  Cardiac evaluation reveals a regular rhythm.     Neurological Exam    Fully oriented, there is no aphasia, agnosia, apraxia, or inattention.  MoCA is 22/30, the issues having to do with delayed recall, though there was also some difficulty with memory encoding initially.  Visual-spatial and executive functions also show some impairment.  Attention is actually fairly well-maintained.  Frontal release signs are absent.  There is no rostrocaudal extinction.  Her mood is euthymic, affect is mood appropriate.    PERRLA/EOMI, visual fields are full, facial movements are symmetric, sensory exam is intact to temperature and light touch.  There is no bradykinesia or hypophonia, the tongue and uvula are midline, there is no dysarthria.  Jaw movements are intact.  Shoulder shrug and head rotation are symmetric.    Musculoskeletal exam reveals normal tone throughout, there is no tremor, asterixis, or drift.  Strength is intact " bilaterally.  Reflexes are brisk and present at all points, there are no asymmetries, none are dropped.  The toes are downgoing bilaterally.    She stands easily, gait is normal and station and stride length, heel, toe, and tandem walking can all be done easily.  There is no appendicular dystaxia.  Fine motor control and repetitive movements are normal throughout, amplitude and frequencies are normal and symmetric.    Sensory exam is intact to vibration and temperature, Romberg is absent.  Assessment & Plan  Cognitive impairment  She does show signs of cognitive impairment but at a very young age, we would be looking for atypical causes.  The anxiety she suffers from, coupled with panic episodes in the past, are probably still playing a bit of a role with all of this, fortunately she is also stopped drinking which is also complicating the issues.  Still, more appropriate and directed workup is warranted.  She lacks the signs or stigmata of something like Parkinson's disease, Lewy Body Disease, etc.  Early onset might be consistent with frontotemporal disorders, but quite often there is a family history, of which she has none.  Its absence does not preclude anything.  History and exam findings are not consistent with NPH, but other structural pathologies do need to be ruled out.  The more prolonged and protracted history of symptom progression is against this being something autoimmune or paraneoplastic in nature, though the still need to be assessed.    For now I encouraged her to follow through with a therapist to make sure things remain stable and so that her anxiety and panic episodes do not get out of control.  She was encouraged to remain alcohol free.  MRI of the brain, CT PET of the brain, EEG, SPEP, B12 and folate levels as well as paraneoplastic markers will be drawn.    For now there is no need for symptomatic relief, she is functioning well creating structure, routine, etc.  She was encouraged to remain  physically active, she was encouraged also to be social with family and friends around whom she is not so self-conscious.  She is maintaining good sleep hygiene, there are no inherent disorders that need to be directly addressed, she is getting some type of cognitive stimulation at work.  Dietary habit is being maintained, we talked about Mediterranean style eating habits as being brain friendly.  We will follow-up once the testing is complete.    Orders:    MR-BRAIN-W/O; Future    CT-PET METABOLIC EVALUATION - BRAIN; Future    Referral to Neurodiagnostics (EEG,EP,EMG/NCS/DBS)    VIT B12,  FOLIC ACID    Sed Rate; Future    SPEP W/REFLEX TO FABIO, A, G, M; Future    Paraneoplastic Autoantibody Eval (Ser); Future    Time: 80 minutes in total spent on patient care including pre-charting, record review, discussion with healthcare staff and documentation.  This includes face-to-face time for exam, review, discussion, as well as counseling and coordinating care.

## 2025-02-16 DIAGNOSIS — R06.02 SHORTNESS OF BREATH: ICD-10-CM

## 2025-02-16 DIAGNOSIS — R05.2 SUBACUTE COUGH: ICD-10-CM

## 2025-02-17 RX ORDER — ALBUTEROL SULFATE 90 UG/1
2 INHALANT RESPIRATORY (INHALATION) EVERY 6 HOURS PRN
Qty: 8.5 G | Refills: 0 | Status: SHIPPED | OUTPATIENT
Start: 2025-02-17

## 2025-03-05 SDOH — HEALTH STABILITY: PHYSICAL HEALTH: ON AVERAGE, HOW MANY DAYS PER WEEK DO YOU ENGAGE IN MODERATE TO STRENUOUS EXERCISE (LIKE A BRISK WALK)?: 2 DAYS

## 2025-03-05 SDOH — ECONOMIC STABILITY: HOUSING INSECURITY
IN THE LAST 12 MONTHS, WAS THERE A TIME WHEN YOU DID NOT HAVE A STEADY PLACE TO SLEEP OR SLEPT IN A SHELTER (INCLUDING NOW)?: NO

## 2025-03-05 SDOH — ECONOMIC STABILITY: FOOD INSECURITY: WITHIN THE PAST 12 MONTHS, YOU WORRIED THAT YOUR FOOD WOULD RUN OUT BEFORE YOU GOT MONEY TO BUY MORE.: NEVER TRUE

## 2025-03-05 SDOH — ECONOMIC STABILITY: INCOME INSECURITY: IN THE LAST 12 MONTHS, WAS THERE A TIME WHEN YOU WERE NOT ABLE TO PAY THE MORTGAGE OR RENT ON TIME?: NO

## 2025-03-05 SDOH — HEALTH STABILITY: PHYSICAL HEALTH: ON AVERAGE, HOW MANY MINUTES DO YOU ENGAGE IN EXERCISE AT THIS LEVEL?: 60 MIN

## 2025-03-05 SDOH — ECONOMIC STABILITY: INCOME INSECURITY: HOW HARD IS IT FOR YOU TO PAY FOR THE VERY BASICS LIKE FOOD, HOUSING, MEDICAL CARE, AND HEATING?: NOT VERY HARD

## 2025-03-05 SDOH — HEALTH STABILITY: MENTAL HEALTH
STRESS IS WHEN SOMEONE FEELS TENSE, NERVOUS, ANXIOUS, OR CAN'T SLEEP AT NIGHT BECAUSE THEIR MIND IS TROUBLED. HOW STRESSED ARE YOU?: VERY MUCH

## 2025-03-05 SDOH — ECONOMIC STABILITY: FOOD INSECURITY: WITHIN THE PAST 12 MONTHS, THE FOOD YOU BOUGHT JUST DIDN'T LAST AND YOU DIDN'T HAVE MONEY TO GET MORE.: NEVER TRUE

## 2025-03-05 SDOH — ECONOMIC STABILITY: TRANSPORTATION INSECURITY
IN THE PAST 12 MONTHS, HAS LACK OF RELIABLE TRANSPORTATION KEPT YOU FROM MEDICAL APPOINTMENTS, MEETINGS, WORK OR FROM GETTING THINGS NEEDED FOR DAILY LIVING?: NO

## 2025-03-05 SDOH — ECONOMIC STABILITY: TRANSPORTATION INSECURITY
IN THE PAST 12 MONTHS, HAS THE LACK OF TRANSPORTATION KEPT YOU FROM MEDICAL APPOINTMENTS OR FROM GETTING MEDICATIONS?: NO

## 2025-03-05 SDOH — ECONOMIC STABILITY: TRANSPORTATION INSECURITY
IN THE PAST 12 MONTHS, HAS LACK OF TRANSPORTATION KEPT YOU FROM MEETINGS, WORK, OR FROM GETTING THINGS NEEDED FOR DAILY LIVING?: NO

## 2025-03-05 ASSESSMENT — SOCIAL DETERMINANTS OF HEALTH (SDOH)
IN A TYPICAL WEEK, HOW MANY TIMES DO YOU TALK ON THE PHONE WITH FAMILY, FRIENDS, OR NEIGHBORS?: THREE TIMES A WEEK
HOW MANY DRINKS CONTAINING ALCOHOL DO YOU HAVE ON A TYPICAL DAY WHEN YOU ARE DRINKING: 7 TO 9
IN A TYPICAL WEEK, HOW MANY TIMES DO YOU TALK ON THE PHONE WITH FAMILY, FRIENDS, OR NEIGHBORS?: THREE TIMES A WEEK
DO YOU BELONG TO ANY CLUBS OR ORGANIZATIONS SUCH AS CHURCH GROUPS UNIONS, FRATERNAL OR ATHLETIC GROUPS, OR SCHOOL GROUPS?: NO
HOW OFTEN DO YOU GET TOGETHER WITH FRIENDS OR RELATIVES?: NEVER
HOW HARD IS IT FOR YOU TO PAY FOR THE VERY BASICS LIKE FOOD, HOUSING, MEDICAL CARE, AND HEATING?: NOT VERY HARD
HOW OFTEN DO YOU ATTEND CHURCH OR RELIGIOUS SERVICES?: MORE THAN 4 TIMES PER YEAR
HOW OFTEN DO YOU HAVE SIX OR MORE DRINKS ON ONE OCCASION: LESS THAN MONTHLY
HOW OFTEN DO YOU GET TOGETHER WITH FRIENDS OR RELATIVES?: NEVER
HOW OFTEN DO YOU ATTEND CHURCH OR RELIGIOUS SERVICES?: MORE THAN 4 TIMES PER YEAR
WITHIN THE PAST 12 MONTHS, YOU WORRIED THAT YOUR FOOD WOULD RUN OUT BEFORE YOU GOT THE MONEY TO BUY MORE: NEVER TRUE
DO YOU BELONG TO ANY CLUBS OR ORGANIZATIONS SUCH AS CHURCH GROUPS UNIONS, FRATERNAL OR ATHLETIC GROUPS, OR SCHOOL GROUPS?: NO
HOW OFTEN DO YOU HAVE A DRINK CONTAINING ALCOHOL: MONTHLY OR LESS
HOW OFTEN DO YOU ATTENT MEETINGS OF THE CLUB OR ORGANIZATION YOU BELONG TO?: NEVER
HOW OFTEN DO YOU ATTENT MEETINGS OF THE CLUB OR ORGANIZATION YOU BELONG TO?: NEVER
IN THE PAST 12 MONTHS, HAS THE ELECTRIC, GAS, OIL, OR WATER COMPANY THREATENED TO SHUT OFF SERVICE IN YOUR HOME?: NO

## 2025-03-05 ASSESSMENT — LIFESTYLE VARIABLES
HOW MANY STANDARD DRINKS CONTAINING ALCOHOL DO YOU HAVE ON A TYPICAL DAY: 7 TO 9
SKIP TO QUESTIONS 9-10: 0
HOW OFTEN DO YOU HAVE A DRINK CONTAINING ALCOHOL: MONTHLY OR LESS
HOW OFTEN DO YOU HAVE SIX OR MORE DRINKS ON ONE OCCASION: LESS THAN MONTHLY
AUDIT-C TOTAL SCORE: 5

## 2025-03-06 ENCOUNTER — OFFICE VISIT (OUTPATIENT)
Dept: MEDICAL GROUP | Facility: PHYSICIAN GROUP | Age: 45
End: 2025-03-06
Payer: COMMERCIAL

## 2025-03-06 VITALS
WEIGHT: 203.2 LBS | DIASTOLIC BLOOD PRESSURE: 70 MMHG | TEMPERATURE: 97.7 F | HEIGHT: 68 IN | HEART RATE: 78 BPM | BODY MASS INDEX: 30.8 KG/M2 | SYSTOLIC BLOOD PRESSURE: 100 MMHG | OXYGEN SATURATION: 97 %

## 2025-03-06 DIAGNOSIS — F10.21 ALCOHOL DEPENDENCE IN REMISSION (HCC): ICD-10-CM

## 2025-03-06 DIAGNOSIS — R13.12 OROPHARYNGEAL DYSPHAGIA: ICD-10-CM

## 2025-03-06 DIAGNOSIS — G89.29 CHRONIC RUQ PAIN: ICD-10-CM

## 2025-03-06 DIAGNOSIS — F51.01 PRIMARY INSOMNIA: ICD-10-CM

## 2025-03-06 DIAGNOSIS — R73.03 PREDIABETES: ICD-10-CM

## 2025-03-06 DIAGNOSIS — R10.11 CHRONIC RUQ PAIN: ICD-10-CM

## 2025-03-06 DIAGNOSIS — Z12.11 COLON CANCER SCREENING: ICD-10-CM

## 2025-03-06 DIAGNOSIS — R71.8 ELEVATED MCV: ICD-10-CM

## 2025-03-06 DIAGNOSIS — E78.2 MIXED HYPERLIPIDEMIA: ICD-10-CM

## 2025-03-06 DIAGNOSIS — F41.1 GAD (GENERALIZED ANXIETY DISORDER): ICD-10-CM

## 2025-03-06 DIAGNOSIS — F32.4 MAJOR DEPRESSIVE DISORDER WITH SINGLE EPISODE, IN PARTIAL REMISSION (HCC): ICD-10-CM

## 2025-03-06 DIAGNOSIS — K76.0 FATTY LIVER: ICD-10-CM

## 2025-03-06 DIAGNOSIS — K14.6 BURNING MOUTH SYNDROME: ICD-10-CM

## 2025-03-06 DIAGNOSIS — Z00.00 HEALTHCARE MAINTENANCE: ICD-10-CM

## 2025-03-06 DIAGNOSIS — E55.9 VITAMIN D DEFICIENCY: ICD-10-CM

## 2025-03-06 DIAGNOSIS — Z13.21 ENCOUNTER FOR VITAMIN DEFICIENCY SCREENING: ICD-10-CM

## 2025-03-06 DIAGNOSIS — N95.1 PERIMENOPAUSE: ICD-10-CM

## 2025-03-06 PROCEDURE — 99214 OFFICE O/P EST MOD 30 MIN: CPT

## 2025-03-06 PROCEDURE — 3074F SYST BP LT 130 MM HG: CPT

## 2025-03-06 PROCEDURE — 3078F DIAST BP <80 MM HG: CPT

## 2025-03-06 RX ORDER — OMEPRAZOLE 20 MG/1
20 CAPSULE, DELAYED RELEASE ORAL DAILY
Qty: 90 CAPSULE | Refills: 0 | Status: SHIPPED | OUTPATIENT
Start: 2025-03-06

## 2025-03-06 ASSESSMENT — PATIENT HEALTH QUESTIONNAIRE - PHQ9
6. FEELING BAD ABOUT YOURSELF - OR THAT YOU ARE A FAILURE OR HAVE LET YOURSELF OR YOUR FAMILY DOWN: SEVERAL DAYS
7. TROUBLE CONCENTRATING ON THINGS, SUCH AS READING THE NEWSPAPER OR WATCHING TELEVISION: NEARLY EVERY DAY
3. TROUBLE FALLING OR STAYING ASLEEP OR SLEEPING TOO MUCH: NOT AT ALL
SUM OF ALL RESPONSES TO PHQ QUESTIONS 1-9: 10
9. THOUGHTS THAT YOU WOULD BE BETTER OFF DEAD, OR OF HURTING YOURSELF: SEVERAL DAYS
5. POOR APPETITE OR OVEREATING: NOT AT ALL
2. FEELING DOWN, DEPRESSED, IRRITABLE, OR HOPELESS: NOT AT ALL
4. FEELING TIRED OR HAVING LITTLE ENERGY: NEARLY EVERY DAY
8. MOVING OR SPEAKING SO SLOWLY THAT OTHER PEOPLE COULD HAVE NOTICED. OR THE OPPOSITE, BEING SO FIGETY OR RESTLESS THAT YOU HAVE BEEN MOVING AROUND A LOT MORE THAN USUAL: MORE THAN HALF THE DAYS
SUM OF ALL RESPONSES TO PHQ9 QUESTIONS 1 AND 2: 0
1. LITTLE INTEREST OR PLEASURE IN DOING THINGS: NOT AT ALL

## 2025-03-06 ASSESSMENT — ENCOUNTER SYMPTOMS
WEAKNESS: 0
NAUSEA: 0
CONSTIPATION: 0
FEVER: 0
HEADACHES: 0
CHILLS: 0
BLURRED VISION: 0
SHORTNESS OF BREATH: 0
COUGH: 0
WEIGHT LOSS: 0
MYALGIAS: 0
DIARRHEA: 0
ABDOMINAL PAIN: 0
VOMITING: 0
DIZZINESS: 0

## 2025-03-06 ASSESSMENT — FIBROSIS 4 INDEX: FIB4 SCORE: 0.66

## 2025-03-06 NOTE — PROGRESS NOTES
Verbal consent was acquired by the patient to use [employer] ambient listening note generation during this visit  Subjective:     CC:  Diagnoses of Colon cancer screening, Oropharyngeal dysphagia, Fatty liver, Chronic RUQ pain, Burning mouth syndrome, Vitamin D deficiency, Alcohol dependence in remission (HCC), Primary insomnia, Elevated MCV, Mixed hyperlipidemia, KATTY (generalized anxiety disorder), Major depressive disorder with single episode, in partial remission (HCC), Perimenopause, Encounter for vitamin deficiency screening, Prediabetes, and Healthcare maintenance were pertinent to this visit.    HISTORY OF THE PRESENT ILLNESS: Patient is a 44 y.o. female. This pleasant patient is here today to establish care and discuss the following problems:  History of Present Illness  The patient is a 44-year-old female who presents to establish care. She also wanted to discuss hormones with you having dizziness, memory, specifically recall issues, anxiety, panic attacks, circulation and such. She does not report any sleep disturbances or hot flashes. Additionally, she is wanting referrals to both ENT and GI.    She has been experiencing difficulty swallowing, particularly with her trazodone medication, which she has resorted to crushing for easier ingestion. This issue has been ongoing for the past 6 months. She reports a sensation of tightness in her throat but does not experience any choking episodes. She also reports a long-standing inability to burp. She does not experience heartburn. She has been sober for 2 months after many years of drinking. She has a constant burning sensation in her mouth and tongue, which her previous doctor suggested might be burning mouth syndrome. This has been ongoing for 2 years and sometimes becomes painful. She does not have a dry mouth but reports increased saliva production. She recently started taking a multivitamin. She has been experiencing right upper quadrant pain for  approximately 8 to 9 years, described as a dull ache with occasional sharpness. This pain is present daily but is not continuous. Despite discontinuing alcohol consumption, she has not noticed any improvement in her symptoms. She has been experiencing nausea for the past few months. She has been under the care of a psychiatrist and was previously prescribed buspirone, which was discontinued due to associated nausea. She has not tried any other medications besides sertraline. She has been experiencing cognitive issues, including memory recall difficulties, anxiety, panic attacks, and circulatory problems. She is uncertain whether these symptoms are hormonal, neurological, or anxiety-related. She has been under the care of a neurologist due to concerns about potential long-term effects of alcohol abuse on her memory. She has been using Mirena for contraception and expresses a desire to have it removed. She plans to have her  undergo a vasectomy. She has not had a Pap smear in the past 3 years. She has been using Mirena for contraception and expresses a desire to have it removed. She plans to have her  undergo a vasectomy. She has been taking trazodone 100 mg daily for insomnia, sertraline 100 mg each morning for anxiety, depression, and mood, and Ativan 1 mg as needed for severe anxiety, typically half a tablet. She has been experiencing panic attacks throughout the day but reports that these have become more manageable since discontinuing alcohol.    SOCIAL HISTORY  The patient has been sober for 2 months after drinking for many years. She occasionally enjoys a glass of wine but feels depressed the next day.    MEDICATIONS  Current: trazodone, sertraline, lorazepam  Discontinued: buspirone    Problem   Burning Mouth Syndrome         ROS:   Review of Systems   Constitutional:  Negative for chills, fever, malaise/fatigue and weight loss.   Eyes:  Negative for blurred vision.   Respiratory:  Negative for  "cough and shortness of breath.    Cardiovascular:  Negative for chest pain.   Gastrointestinal:  Negative for abdominal pain, constipation, diarrhea, nausea and vomiting.   Musculoskeletal:  Negative for myalgias.   Neurological:  Negative for dizziness, weakness and headaches.         Objective:     Exam: /70 (BP Location: Left arm, Patient Position: Sitting, BP Cuff Size: Adult)   Pulse 78   Temp 36.5 °C (97.7 °F) (Temporal)   Ht 1.727 m (5' 8\")   Wt 92.2 kg (203 lb 3.2 oz)   SpO2 97%  Body mass index is 30.9 kg/m².    Physical Exam  Constitutional:       General: She is not in acute distress.     Appearance: Normal appearance. She is not ill-appearing or toxic-appearing.   HENT:      Head: Normocephalic.   Eyes:      Conjunctiva/sclera: Conjunctivae normal.   Cardiovascular:      Rate and Rhythm: Normal rate and regular rhythm.      Pulses: Normal pulses.      Heart sounds: Normal heart sounds. No murmur heard.  Pulmonary:      Effort: Pulmonary effort is normal. No respiratory distress.      Breath sounds: Normal breath sounds.   Skin:     General: Skin is warm and dry.   Neurological:      General: No focal deficit present.      Mental Status: She is alert and oriented to person, place, and time.   Psychiatric:         Mood and Affect: Mood normal.         Behavior: Behavior normal.           Labs:   No visits with results within 1 Month(s) from this visit.   Latest known visit with results is:   Hospital Outpatient Visit on 10/18/2024   Component Date Value    T3 10/18/2024 98.2     WBC 10/18/2024 8.2     RBC 10/18/2024 4.38     Hemoglobin 10/18/2024 13.6     Hematocrit 10/18/2024 42.6     MCV 10/18/2024 97.3     MCH 10/18/2024 31.1     MCHC 10/18/2024 31.9 (L)     RDW 10/18/2024 45.6     Platelet Count 10/18/2024 282     MPV 10/18/2024 10.4     Neutrophils-Polys 10/18/2024 54.30     Lymphocytes 10/18/2024 35.20     Monocytes 10/18/2024 8.30     Eosinophils 10/18/2024 1.30     Basophils 10/18/2024 " 0.50     Immature Granulocytes 10/18/2024 0.40     Nucleated RBC 10/18/2024 0.00     Neutrophils (Absolute) 10/18/2024 4.47     Lymphs (Absolute) 10/18/2024 2.90     Monos (Absolute) 10/18/2024 0.68     Eos (Absolute) 10/18/2024 0.11     Baso (Absolute) 10/18/2024 0.04     Immature Granulocytes (a* 10/18/2024 0.03     NRBC (Absolute) 10/18/2024 0.00     TSH 10/18/2024 1.370     Free T-4 10/18/2024 1.08     Glycohemoglobin 10/18/2024 5.8 (H)     Est Avg Glucose 10/18/2024 120     Sodium 10/18/2024 138     Potassium 10/18/2024 4.3     Chloride 10/18/2024 103     Co2 10/18/2024 22     Anion Gap 10/18/2024 13.0     Glucose 10/18/2024 89     Bun 10/18/2024 14     Creatinine 10/18/2024 0.52     Calcium 10/18/2024 10.2     Correct Calcium 10/18/2024 9.7     AST(SGOT) 10/18/2024 15     ALT(SGPT) 10/18/2024 13     Alkaline Phosphatase 10/18/2024 82     Total Bilirubin 10/18/2024 0.3     Albumin 10/18/2024 4.6     Total Protein 10/18/2024 7.6     Globulin 10/18/2024 3.0     A-G Ratio 10/18/2024 1.5     GFR (CKD-EPI) 10/18/2024 116          Assessment & Plan: Medical Decision Making   44 y.o. female with the following -    Assessment & Plan  1. Dysphagia.  She reports difficulty swallowing, particularly with her trazodone medication, and a sensation of tightness in her throat. There is no associated heartburn or choking. A referral to a gastroenterologist will be made for further evaluation. In the interim, a trial of omeprazole 20 mg daily, to be taken 30 minutes before breakfast, will be initiated. A prescription for a 90-day supply of omeprazole will be sent to [facility]. If symptoms persist, further evaluation by GI will be necessary.    2. Right upper quadrant pain.  She has a history of long-term right upper quadrant pain, described as a dull ache with occasional sharpness, which has not improved since cessation of alcohol. A referral to a gastroenterologist will be made for further evaluation. An ultrasound of the  right upper quadrant will be ordered to assess the liver.    3. Anxiety and depression.  She is currently on sertraline 100 mg each morning and Ativan 1 mg as needed for anxiety. She reports long-term anxiety and panic attacks, which have improved but are still present. She is advised to discuss alternative medications, such as Lexapro (escitalopram), with her psychiatrist. Genetic testing for medication response may also be considered.    4. Burning mouth syndrome.  She reports a constant burning sensation in her mouth and tongue for the past 2 years. Differential diagnoses include nutritional deficiencies, hormonal changes, allergies, and GERD. She is advised to continue her multivitamin and consider additional supplements such as B complex, D3, alpha-lipoic acid, and capsaicin rinse. Lifestyle modifications, including avoiding irritants, spicy foods, acidic foods, tobacco, and alcohol, and using mild oral care products, are recommended. She is also advised to stay hydrated, suck on ice chips, chew sugar-free gum, and practice stress reduction techniques such as yoga, meditation, and deep breathing. A trial of Prilosec may also help with her symptoms. A prescription for Biotene mouth rinse will be provided. If symptoms persist, she should consult a dentist, oral medicine specialist, or neurologist.    5. Health maintenance.  Her CBC and metabolic panel results were within normal limits. Her A1c was slightly elevated, indicating a prediabetic state. Her triglycerides and LDL cholesterol levels were also elevated. Her thyroid panel was normal. Screening for vitamin deficiencies will be conducted. Baseline hormone levels, including progesterone, estradiol, follicle-stimulating hormone, and luteinizing hormone, will be obtained. A Pap smear will be scheduled during her next visit.      Problem List Items Addressed This Visit          Family Medicine Problems    Vitamin D deficiency    Relevant Orders    VITAMIN D,25  HYDROXY (DEFICIENCY)       Other    MDD (major depressive disorder)    KATTY (generalized anxiety disorder)    Relevant Orders    IRON/TOTAL IRON BIND    ZINC SERUM    VITAMIN B12    FOLATE    Primary insomnia    Elevated MCV    Relevant Orders    IRON/TOTAL IRON BIND    Mixed hyperlipidemia    Relevant Orders    Lipid Profile    Fatty liver    Relevant Orders    Referral to Gastroenterology    [facility]    Alcohol dependence (HCC)    Relevant Orders    ZINC SERUM    VITAMIN B12    FOLATE    Burning mouth syndrome    Relevant Orders    IRON/TOTAL IRON BIND    ZINC SERUM    VITAMIN B12    FOLATE    VITAMIN D,25 HYDROXY (DEFICIENCY)     Other Visit Diagnoses         Colon cancer screening        Relevant Orders    Referral to GI for Colonoscopy      Oropharyngeal dysphagia        Relevant Medications    omeprazole (PRILOSEC) 20 MG delayed-release capsule    Other Relevant Orders    Referral to Gastroenterology      Chronic RUQ pain        Relevant Orders    [facility]      Perimenopause        Relevant Orders    PROGESTERONE    ESTRADIOL    FSH/LH      Encounter for vitamin deficiency screening        Relevant Orders    IRON/TOTAL IRON BIND    ZINC SERUM    VITAMIN B12    FOLATE    VITAMIN D,25 HYDROXY (DEFICIENCY)      Prediabetes        Relevant Orders    Comp Metabolic Panel    HEMOGLOBIN A1C      Healthcare maintenance        Relevant Orders    IRON/TOTAL IRON BIND    ZINC SERUM    VITAMIN B12    FOLATE    VITAMIN D,25 HYDROXY (DEFICIENCY)    PROGESTERONE    ESTRADIOL    FSH/LH    Lipid Profile    Comp Metabolic Panel    HEMOGLOBIN A1C            Differential diagnosis, natural history, supportive care, and indications for immediate follow-up discussed.  Shared decision making approach was utilized, and patient is amendable with plan of care.  Patient understands to return to clinic or go to the emergency department if symptoms worsen. All questions and concerns addressed to the best of my knowledge.      Return in about 6  months (around 9/6/2025), or if symptoms worsen or fail to improve, for Wellness with Pap.    Please note that this dictation was created using voice recognition software. I have made every reasonable attempt to correct obvious errors, but I expect that there are errors of grammar and possibly content that I did not discover before finalizing the note.

## 2025-03-10 NOTE — Clinical Note
REFERRAL APPROVAL NOTICE         Sent on March 10, 2025                   Mattie Hicks  [address] [place] [address]                   Dear MsBlossom Kurt,    After a careful review of the medical information and benefit coverage, [facility] has processed your referral. See below for additional details.    If applicable, you must be actively enrolled with your insurance for coverage of the authorized service. If you have any questions regarding your coverage, please contact your insurance directly.    REFERRAL INFORMATION   Referral #:  [id number]  Referred-To Provider    Referred-By Provider:  Gastroenterology    Manuel Simth D.N.P.   [facility]      [address] [phone] [address] [place] [address] [phone]    Referral Start Date:  03/06/2025  Referral End Date:   03/06/2026             SCHEDULING  If you do not already have an appointment, please call [phone] to make an appointment.     MORE INFORMATION  If you do not already have a [employer] account, sign up at: [email or web address]  You can access your medical information, make appointments, see lab results, billing information, and more.  If you have questions regarding this referral, please contact  the [facility] Referrals department at:             [phone]. Monday - Friday 8:00AM - 5:00PM.     Sincerely,    [facility]

## 2025-03-10 NOTE — Clinical Note
REFERRAL APPROVAL NOTICE         Sent on March 10, 2025                   Mattie Hicks  [address] [place] [address]                   Dear Ms. Hicks,    After a careful review of the medical information and benefit coverage, [facility] has processed your referral. See below for additional details.    If applicable, you must be actively enrolled with your insurance for coverage of the authorized service. If you have any questions regarding your coverage, please contact your insurance directly.    REFERRAL INFORMATION   Referral #:  [id number]  Referred-To Provider    Referred-By Provider:  Gastroenterology    Manuel Smith D.N.P.   [facility]      [address]  [place] [address]  [phone] [address] [facility] [place] [address]  [phone]    Referral Start Date:  03/06/2025  Referral End Date:   03/06/2026             SCHEDULING  If you do not already have an appointment, please call [phone] to make an appointment.     MORE INFORMATION  If you do not already have a [employer] account, sign up at: [email or web address]  You can access your medical information, make appointments, see lab results, billing information, and more.  If you have questions regarding this referral, please contact  the [facility] Referrals department at:             [phone]. Monday - Friday 8:00AM - 5:00PM.     Sincerely,    [facility]

## 2025-03-13 ENCOUNTER — HOSPITAL ENCOUNTER (OUTPATIENT)
Dept: RADIOLOGY | Facility: MEDICAL CENTER | Age: 45
End: 2025-03-13
Attending: PSYCHIATRY & NEUROLOGY
Payer: COMMERCIAL

## 2025-03-13 DIAGNOSIS — R41.89 COGNITIVE IMPAIRMENT: ICD-10-CM

## 2025-03-13 LAB — GLUCOSE BLD-MCNC: 84 MG/DL (ref 65–99)

## 2025-03-13 PROCEDURE — A9552 F18 FDG: HCPCS

## 2025-03-23 ENCOUNTER — APPOINTMENT (OUTPATIENT)
Dept: RADIOLOGY | Facility: MEDICAL CENTER | Age: 45
End: 2025-03-23
Attending: PSYCHIATRY & NEUROLOGY
Payer: COMMERCIAL

## 2025-03-23 DIAGNOSIS — R41.89 COGNITIVE IMPAIRMENT: ICD-10-CM

## 2025-03-23 PROCEDURE — 70551 MRI BRAIN STEM W/O DYE: CPT

## 2025-03-28 ENCOUNTER — TELEPHONE (OUTPATIENT)
Dept: MEDICAL GROUP | Facility: PHYSICIAN GROUP | Age: 45
End: 2025-03-28
Payer: COMMERCIAL

## 2025-03-28 ENCOUNTER — HOSPITAL ENCOUNTER (OUTPATIENT)
Dept: LAB | Facility: MEDICAL CENTER | Age: 45
End: 2025-03-28
Payer: COMMERCIAL

## 2025-03-28 ENCOUNTER — HOSPITAL ENCOUNTER (OUTPATIENT)
Dept: LAB | Facility: MEDICAL CENTER | Age: 45
End: 2025-03-28
Attending: PSYCHIATRY & NEUROLOGY
Payer: COMMERCIAL

## 2025-03-28 DIAGNOSIS — R73.03 PREDIABETES: ICD-10-CM

## 2025-03-28 DIAGNOSIS — E78.2 MIXED HYPERLIPIDEMIA: ICD-10-CM

## 2025-03-28 DIAGNOSIS — Z00.00 HEALTHCARE MAINTENANCE: ICD-10-CM

## 2025-03-28 DIAGNOSIS — F41.1 GAD (GENERALIZED ANXIETY DISORDER): ICD-10-CM

## 2025-03-28 DIAGNOSIS — K14.6 BURNING MOUTH SYNDROME: ICD-10-CM

## 2025-03-28 DIAGNOSIS — N95.1 PERIMENOPAUSE: ICD-10-CM

## 2025-03-28 DIAGNOSIS — F10.21 ALCOHOL DEPENDENCE IN REMISSION (HCC): ICD-10-CM

## 2025-03-28 DIAGNOSIS — R41.89 COGNITIVE IMPAIRMENT: ICD-10-CM

## 2025-03-28 DIAGNOSIS — E55.9 VITAMIN D DEFICIENCY: ICD-10-CM

## 2025-03-28 DIAGNOSIS — Z13.21 ENCOUNTER FOR VITAMIN DEFICIENCY SCREENING: ICD-10-CM

## 2025-03-28 DIAGNOSIS — R71.8 ELEVATED MCV: ICD-10-CM

## 2025-03-28 LAB
25(OH)D3 SERPL-MCNC: 30 NG/ML (ref 30–100)
ALBUMIN SERPL BCP-MCNC: 4.4 G/DL (ref 3.2–4.9)
ALBUMIN/GLOB SERPL: 1.3 G/DL
ALP SERPL-CCNC: 78 U/L (ref 30–99)
ALT SERPL-CCNC: 21 U/L (ref 2–50)
ANION GAP SERPL CALC-SCNC: 10 MMOL/L (ref 7–16)
AST SERPL-CCNC: 20 U/L (ref 12–45)
BILIRUB SERPL-MCNC: 0.4 MG/DL (ref 0.1–1.5)
BUN SERPL-MCNC: 14 MG/DL (ref 8–22)
CALCIUM ALBUM COR SERPL-MCNC: 9.9 MG/DL (ref 8.5–10.5)
CALCIUM SERPL-MCNC: 10.2 MG/DL (ref 8.5–10.5)
CHLORIDE SERPL-SCNC: 106 MMOL/L (ref 96–112)
CHOLEST SERPL-MCNC: 212 MG/DL (ref 100–199)
CO2 SERPL-SCNC: 22 MMOL/L (ref 20–33)
CREAT SERPL-MCNC: 0.62 MG/DL (ref 0.5–1.4)
ERYTHROCYTE [SEDIMENTATION RATE] IN BLOOD BY WESTERGREN METHOD: 18 MM/HOUR (ref 0–25)
EST. AVERAGE GLUCOSE BLD GHB EST-MCNC: 111 MG/DL
ESTRADIOL SERPL-MCNC: 56.5 PG/ML
FSH SERPL-ACNC: 8.2 MIU/ML
GFR SERPLBLD CREATININE-BSD FMLA CKD-EPI: 111 ML/MIN/1.73 M 2
GLOBULIN SER CALC-MCNC: 3.5 G/DL (ref 1.9–3.5)
GLUCOSE SERPL-MCNC: 85 MG/DL (ref 65–99)
HBA1C MFR BLD: 5.5 % (ref 4–5.6)
HDLC SERPL-MCNC: 48 MG/DL
IRON SATN MFR SERPL: 50 % (ref 15–55)
IRON SERPL-MCNC: 123 UG/DL (ref 40–170)
LDLC SERPL CALC-MCNC: 121 MG/DL
LH SERPL-ACNC: 7.8 IU/L
POTASSIUM SERPL-SCNC: 4.2 MMOL/L (ref 3.6–5.5)
PROGEST SERPL-MCNC: <0.1 NG/ML
PROT SERPL-MCNC: 7.9 G/DL (ref 6–8.2)
SODIUM SERPL-SCNC: 138 MMOL/L (ref 135–145)
TIBC SERPL-MCNC: 244 UG/DL (ref 250–450)
TRIGL SERPL-MCNC: 215 MG/DL (ref 0–149)
UIBC SERPL-MCNC: 121 UG/DL (ref 110–370)
VIT B12 SERPL-MCNC: 945 PG/ML (ref 211–911)
VIT B12 SERPL-MCNC: 999 PG/ML (ref 211–911)

## 2025-03-28 PROCEDURE — 80053 COMPREHEN METABOLIC PANEL: CPT

## 2025-03-28 PROCEDURE — 85652 RBC SED RATE AUTOMATED: CPT

## 2025-03-28 PROCEDURE — 84630 ASSAY OF ZINC: CPT

## 2025-03-28 PROCEDURE — 82607 VITAMIN B-12: CPT | Mod: 91

## 2025-03-28 PROCEDURE — 82306 VITAMIN D 25 HYDROXY: CPT

## 2025-03-28 PROCEDURE — 83540 ASSAY OF IRON: CPT

## 2025-03-28 PROCEDURE — 82670 ASSAY OF TOTAL ESTRADIOL: CPT

## 2025-03-28 PROCEDURE — 36415 COLL VENOUS BLD VENIPUNCTURE: CPT

## 2025-03-28 PROCEDURE — 83001 ASSAY OF GONADOTROPIN (FSH): CPT

## 2025-03-28 PROCEDURE — 82607 VITAMIN B-12: CPT

## 2025-03-28 PROCEDURE — 82746 ASSAY OF FOLIC ACID SERUM: CPT | Mod: 91

## 2025-03-28 PROCEDURE — 83550 IRON BINDING TEST: CPT

## 2025-03-28 PROCEDURE — 83002 ASSAY OF GONADOTROPIN (LH): CPT

## 2025-03-28 PROCEDURE — 84144 ASSAY OF PROGESTERONE: CPT

## 2025-03-28 PROCEDURE — 84155 ASSAY OF PROTEIN SERUM: CPT

## 2025-03-28 PROCEDURE — 83036 HEMOGLOBIN GLYCOSYLATED A1C: CPT

## 2025-03-28 PROCEDURE — 80061 LIPID PANEL: CPT

## 2025-03-28 PROCEDURE — 82746 ASSAY OF FOLIC ACID SERUM: CPT

## 2025-03-28 PROCEDURE — 84165 PROTEIN E-PHORESIS SERUM: CPT

## 2025-03-28 NOTE — TELEPHONE ENCOUNTER
VOICEMAIL  1. Caller Name: Mattie Hicks                        Call Back Number: There are no phone numbers on file.      2. Message: Two referral were put in for gastro and not one for the ENT. Pt is request ENT referral be placed     3. Patient approves office to leave a detailed voicemail/MyChart message: N\A

## 2025-03-29 LAB
FOLATE SERPL-MCNC: 18.4 NG/ML
FOLATE SERPL-MCNC: 22.1 NG/ML

## 2025-03-30 ENCOUNTER — RESULTS FOLLOW-UP (OUTPATIENT)
Dept: MEDICAL GROUP | Facility: PHYSICIAN GROUP | Age: 45
End: 2025-03-30

## 2025-03-30 DIAGNOSIS — R13.12 OROPHARYNGEAL DYSPHAGIA: ICD-10-CM

## 2025-03-31 LAB — ZINC SERPL-MCNC: 78 UG/DL (ref 60–120)

## 2025-04-01 LAB
ALBUMIN SERPL ELPH-MCNC: 4.49 G/DL (ref 3.75–5.01)
ALPHA1 GLOB SERPL ELPH-MCNC: 0.24 G/DL (ref 0.19–0.46)
ALPHA2 GLOB SERPL ELPH-MCNC: 0.65 G/DL (ref 0.48–1.05)
B-GLOBULIN SERPL ELPH-MCNC: 1 G/DL (ref 0.48–1.1)
GAMMA GLOB SERPL ELPH-MCNC: 1.22 G/DL (ref 0.62–1.51)
INTERPRETATION SERPL IFE-IMP: NORMAL
MONOCLON BAND OBS SERPL: NORMAL
MONOCLONAL PROTEIN NL11656: NORMAL G/DL
PATHOLOGY STUDY: NORMAL
PROT SERPL-MCNC: 7.6 G/DL (ref 6.3–8.2)

## 2025-04-02 NOTE — Clinical Note
REFERRAL APPROVAL NOTICE         Sent on April 2, 2025                   Mattie Hicks  [address] [place] [address]                   Dear MsBlossom Kurt,    After a careful review of the medical information and benefit coverage, [facility] has processed your referral. See below for additional details.    If applicable, you must be actively enrolled with your insurance for coverage of the authorized service. If you have any questions regarding your coverage, please contact your insurance directly.    REFERRAL INFORMATION   Referral #:  [id number]  Referred-To Provider    Referred-By Provider:  Otolaryngology    Manuel Smith D.N.P.   [facility]      [address]  [place] [address] [phone] [address]  [place] [address]  [phone]    Referral Start Date:  03/30/2025  Referral End Date:   03/30/2026             SCHEDULING  If you do not already have an appointment, please call [phone] to make an appointment.     MORE INFORMATION  If you do not already have a [employer] account, sign up at: [email or web address]  You can access your medical information, make appointments, see lab results, billing information, and more.  If you have questions regarding this referral, please contact  the [facility] Referrals department at:             [phone]. Monday - Friday 8:00AM - 5:00PM.     Sincerely,    [facility]

## 2025-04-10 LAB — TEST NAME 95000: NORMAL

## 2025-04-11 ENCOUNTER — HOSPITAL ENCOUNTER (OUTPATIENT)
Dept: RADIOLOGY | Facility: MEDICAL CENTER | Age: 45
End: 2025-04-11
Payer: COMMERCIAL

## 2025-04-11 DIAGNOSIS — K76.0 FATTY LIVER: ICD-10-CM

## 2025-04-11 DIAGNOSIS — R10.11 CHRONIC RUQ PAIN: ICD-10-CM

## 2025-04-11 DIAGNOSIS — G89.29 CHRONIC RUQ PAIN: ICD-10-CM

## 2025-04-11 PROCEDURE — 76705 ECHO EXAM OF ABDOMEN: CPT

## 2025-04-14 ENCOUNTER — RESULTS FOLLOW-UP (OUTPATIENT)
Dept: MEDICAL GROUP | Facility: PHYSICIAN GROUP | Age: 45
End: 2025-04-14

## 2025-04-25 ENCOUNTER — HOSPITAL ENCOUNTER (OUTPATIENT)
Dept: RADIOLOGY | Facility: MEDICAL CENTER | Age: 45
End: 2025-04-25
Payer: COMMERCIAL

## 2025-04-25 DIAGNOSIS — Z12.31 VISIT FOR SCREENING MAMMOGRAM: ICD-10-CM

## 2025-04-25 PROCEDURE — 77067 SCR MAMMO BI INCL CAD: CPT

## 2025-04-27 DIAGNOSIS — R74.8 ELEVATED LIVER ENZYMES: ICD-10-CM

## 2025-04-27 DIAGNOSIS — K76.0 FATTY LIVER: ICD-10-CM

## 2025-04-29 ENCOUNTER — APPOINTMENT (OUTPATIENT)
Dept: MEDICAL GROUP | Facility: PHYSICIAN GROUP | Age: 45
End: 2025-04-29
Payer: COMMERCIAL

## 2025-04-30 NOTE — Clinical Note
REFERRAL APPROVAL NOTICE         Sent on April 30, 2025                   Mattie Hicks  [address] [place] [address]                   Dear Ms. Hicks,    After a careful review of the medical information and benefit coverage, [facility] has processed your referral. See below for additional details.    If applicable, you must be actively enrolled with your insurance for coverage of the authorized service. If you have any questions regarding your coverage, please contact your insurance directly.    REFERRAL INFORMATION   Referral #:  [id number]  Referred-To Provider    Referred-By Provider:  Gastroenterology    Manuel Smith D.N.P.   [facility]      [address]  [place] [address]  [phone] [address] [facility] [place] [address]  [phone]    Referral Start Date:  04/27/2025  Referral End Date:   04/27/2026             SCHEDULING  If you do not already have an appointment, please call [phone] to make an appointment.     MORE INFORMATION  If you do not already have a [employer] account, sign up at: [email or web address]  You can access your medical information, make appointments, see lab results, billing information, and more.  If you have questions regarding this referral, please contact  the [facility] Referrals department at:             [phone]. Monday - Friday 8:00AM - 5:00PM.     Sincerely,    [facility]

## 2025-05-16 ENCOUNTER — APPOINTMENT (OUTPATIENT)
Dept: OBGYN | Facility: CLINIC | Age: 45
End: 2025-05-16
Payer: COMMERCIAL

## 2025-06-06 ENCOUNTER — APPOINTMENT (OUTPATIENT)
Dept: NEUROLOGY | Facility: MEDICAL CENTER | Age: 45
End: 2025-06-06
Attending: PSYCHIATRY & NEUROLOGY
Payer: COMMERCIAL

## 2025-06-13 ENCOUNTER — GYNECOLOGY VISIT (OUTPATIENT)
Dept: OBGYN | Facility: CLINIC | Age: 45
End: 2025-06-13
Payer: COMMERCIAL

## 2025-06-13 ENCOUNTER — HOSPITAL ENCOUNTER (OUTPATIENT)
Facility: MEDICAL CENTER | Age: 45
End: 2025-06-13
Attending: PHYSICIAN ASSISTANT
Payer: COMMERCIAL

## 2025-06-13 DIAGNOSIS — B00.9 HSV INFECTION: ICD-10-CM

## 2025-06-13 DIAGNOSIS — N95.1 PERIMENOPAUSAL SYMPTOMS: ICD-10-CM

## 2025-06-13 DIAGNOSIS — Z01.419 WELL WOMAN EXAM: Primary | ICD-10-CM

## 2025-06-13 DIAGNOSIS — Z12.4 SCREENING FOR CERVICAL CANCER: ICD-10-CM

## 2025-06-13 DIAGNOSIS — N89.8 VAGINAL IRRITATION: ICD-10-CM

## 2025-06-13 PROCEDURE — 99459 PELVIC EXAMINATION: CPT | Performed by: PHYSICIAN ASSISTANT

## 2025-06-13 PROCEDURE — 87624 HPV HI-RISK TYP POOLED RSLT: CPT

## 2025-06-13 PROCEDURE — 99396 PREV VISIT EST AGE 40-64: CPT | Performed by: PHYSICIAN ASSISTANT

## 2025-06-13 PROCEDURE — 87480 CANDIDA DNA DIR PROBE: CPT

## 2025-06-13 PROCEDURE — 88142 CYTOPATH C/V THIN LAYER: CPT

## 2025-06-13 PROCEDURE — 87660 TRICHOMONAS VAGIN DIR PROBE: CPT

## 2025-06-13 PROCEDURE — 87510 GARDNER VAG DNA DIR PROBE: CPT

## 2025-06-13 PROCEDURE — 99213 OFFICE O/P EST LOW 20 MIN: CPT | Mod: 25 | Performed by: PHYSICIAN ASSISTANT

## 2025-06-13 RX ORDER — VALACYCLOVIR HYDROCHLORIDE 1 G/1
1000 TABLET, FILM COATED ORAL 2 TIMES DAILY
Qty: 10 TABLET | Refills: 3 | Status: SHIPPED | OUTPATIENT
Start: 2025-06-13

## 2025-06-13 RX ORDER — ESTRADIOL 0.1 MG/G
CREAM VAGINAL
Qty: 42.5 G | Refills: 3 | Status: SHIPPED | OUTPATIENT
Start: 2025-06-13

## 2025-06-13 NOTE — PROGRESS NOTES
ANNUAL GYNECOLOGY VISIT    Chief Complaint  Annual Exam    Subjective  Mattie Hicks is a 45 y.o. female  No LMP recorded. Patient has had an implant. With Mirena IUD placed  for contraception who presents today for Annual Exam.     Pt reports night sweats, brain fog, mood changes, vaginal burning  x at least 1 year. Taking sertraline for anxiety which has bee effective. Denies hot flases or urinary sx.     Genital HSV outbreaks about twice a month. Not doing any treatment. Has done Valtrex in the past. No sx currently.     Preventive Care   Immunization History   Administered Date(s) Administered    Hep A/HEP B Combined Vaccine (TwinRix) 10/03/2013    Influenza Seasonal Injectable - Historical Data 10/27/2011    Influenza Vaccine H1N1 - HISTORICAL DATA 10/16/2009    Influenza Vaccine Quad Inj (Pf) 10/03/2013, 10/17/2014, 10/13/2018, 10/09/2019, 10/10/2019, 10/30/2020, 2022    Influenza Vaccine Quad Inj (Preserved) 10/20/2021    Influenza split virus trivalent (PF) 2015    Meningococcal Conjugate Vaccine MCV4 (Menactra) 10/03/2013    Tdap Vaccine 07/10/2012, 10/17/2014, 2018     Last Pap: 2019 neg cotest   Any abnormal pap smears?  yes - 20 years ago with cryo  Last Mammogram: 24  Last Colonoscopy: scheduled 2025   Last DEXA: n/a  Routine Labs monitoring: monitored per PCP     Gynecology History  Current Sexual Activity: yes -  monogmaous male partner   History of sexually transmitted diseases? yes - HSV  Abnormal discharge? no  Menopause: no  Postmenopausal bleeding: no    Menstrual History  No LMP recorded. Patient has had an implant.  Reports intermittent cramps and somewhat cyclical light spotting.     Contraception  Current: IUD  Past: none      Cancer Risk Assessement:  Family history of:   - Breast cancer: no   - Ovarian cancer: no   - Uterine cancer: Sister    - Colon cancer: no    Obstetric History  OB History    Para Term  AB Living   6 3 3  3 3    SAB IAB Ectopic Molar Multiple Live Births   2     3      # Outcome Date GA Lbr Raji/2nd Weight Sex Type Anes PTL Lv   6 Term      CS-LTranv   MARGARITA   5 Term      CS-LTranv   MARGARITA   4 Term      Vag-Spont   MARGARITA   3 AB            2 SAB            1 SAB                Past Medical History  Past Medical History[1]    Past Surgical History  Past Surgical History[2]    Social History  Social History[3]     Family History  Family History   Problem Relation Age of Onset    Hypertension Mother     COPD Father     Uterine cancer Sister     Heart Failure Maternal Grandfather     Diabetes Paternal Grandmother        Home Medications  Current Outpatient Medications   Medication Sig    traZODone (DESYREL) 50 MG Tab TAKE 1/2 TO 1 TABLET BY MOUTH EVERY NIGHT AS NEEDED    sertraline (ZOLOFT) 100 MG Tab Take 100 mg by mouth every morning.    LORazepam (ATIVAN) 1 MG Tab Take 1 mg by mouth every day.    levonorgestrel (MIRENA) 52 mg (20 mcg/24 hr) IUD 1 Each by Intrauterine route Continuous.    omeprazole (PRILOSEC) 20 MG delayed-release capsule Take 1 Capsule by mouth every day. (Patient not taking: Reported on 6/13/2025)       Allergies/Reactions  Allergies[4]    ROS  Review of Systems:  Gen: no fevers or chills, no significant weight loss or gain  Respiratory:  no cough or dyspnea  Cardiac:  no chest pain, no palpitations, no syncope  GI:  no heartburn, no abdominal pain, no nausea or vomiting  Psych: no depression or anxiety    Objective  There were no vitals taken for this visit.    Constitutional: The patient is well developed and well nourished.  Psychiatric: Patient is oriented to time place and person.   Skin: No rash observed.  Neck: Appears symmetric. Thyroid normal size  Respiratory: normal effort  Breast: Inspection reveals no asymetry or nipple discharge, no skin thickening, dimpling or erythema.  Palpation demonstrates no masses.  Abdomen: Soft, non-tender.  Pelvic Exam:      Vulva: external female genitalia are normal in  appearance. No lesions     Urethra - no lesions, no erythema     Vagina: moist, pink, normal ruggae     Cervix: pink, smooth, no lesions, no CMT     Uterus - non-tender, normal size, shape, contour, mobile, anteverted     Ovaries: non-tender, no appreciable masses   Pap Smear performed: Yes   Chaperone Present: YOBANI Ko  Extremities: Legs are symmetric and without tenderness. There is no edema present.      Assessment & Plan  Mattie Hicks is a 45 y.o. female who presents today for Annual Gyn Exam.     1. Health Maintenance   PAP: THINPREP PAP WITH HPV; collected   - Educated on Pap smear screening and guidelines for age per ACOG and ASSCP.   STI Screen (HIV, Syphilis, Chlamydia / Gonorrhea): denies  MAMMOGRAM: UTD  - Educated on ACOG guidelines for mammography screening   COLONOSCOPY: UTD  DEXA: n/a  IMMUNIZATIONS: UTD  TOBACCO: former tobacco use   DIABETES SCREEN: monitored by PCP  CHOLESTEROL SCREEN: monitored per PCP  COUNSELING: breast self exam, mammography screening, and menopause  Anticipatory guidance given. Encouraged adequate water intake, healthy diet, regular exercise.     # Perimenopausal symptoms     - Pt likely perimenopausal. Advised menopause is diagnosed when she goes 1 year without a menstrual cycle. Discussed average age of menopause, common perimenopausal symptoms, expected course, and when to seek treatment if desired.   - Advised the sertraline for anxiety is likely helping with some perimenopausal symptoms.   - Will be difficult to diagnose when she is menopausal with IUD in place as this is suppressing cycles. Recommend keeping it in for full 8 years and will likely be menopausal by the time it is due to be removed.     # Vaginal irritation,  vaginal atrophy    - Vag path collected to r/o infection as cause of vaginal burning. Vag paths in the past have been negative for similar sx. If negative can try vaginal estrogen cream for atrophy as this could be the cause of sx. If she does not  get relief in 1-2 months can try pelvic floor PT.   - VAGINAL PATHOGENS DNA PANEL; Future  - estradiol (ESTRACE VAGINAL) 0.1 MG/GM vaginal cream; Apply 1g cream inside vagina using applicator nightly for 2 weeks, then twice per week thereafter  Dispense: 42.5 g; Refill: 3    # HSV infection    - Advised patient the frequency of her HSV outbreaks every 2 weeks needs to be addressed.  Will try as needed Valtrex to start as she has not been doing anything for treatment.  If treating it decreases the frequency of outbreaks we can continue as needed treatment.  However, if she continues to have outbreaks every 2 weeks we will likely need to be on a prophylactic dose.  - valacyclovir (VALTREX) 1 GM Tab; Take 1 Tablet by mouth 2 times a day.  Dispense: 10 Tablet; Refill: 3      Return: Annually or PRN    Lauren Payne P.A.-C.  [facility]   2025         [1]   Past Medical History:  Diagnosis Date    Acquired hypothyroidism 2023    Alcohol abuse     Back pain     Chronic RUQ pain 10/10/2018    Depression     Dyslipidemia 2021    Elevated liver enzymes 2021    Indigestion     Seizure (HCC)     Vitamin D deficiency 2022   [2]   Past Surgical History:  Procedure Laterality Date    GYN SURGERY      PRIMARY C SECTION     [3]   Social History  Tobacco Use    Smoking status: Former     Current packs/day: 0.00     Types: Cigarettes     Quit date: 2015     Years since quittin.4    Smokeless tobacco: Never   Vaping Use    Vaping status: Never Used   Substance Use Topics    Alcohol use: Yes     Comment: once very couple of months    Drug use: No   [4]   Allergies  Allergen Reactions    Prednisone Anxiety

## 2025-06-13 NOTE — PROGRESS NOTES
Patient here for annual exam.   Last pap done/results : 04/19/2019  LMP : none due to IUD   BCM : Mirena 2021  Last Mammogram, if applicable: 04/25/2025  Pt states no concerns   Phone/Pharmacy verified

## 2025-06-19 ENCOUNTER — RESULTS FOLLOW-UP (OUTPATIENT)
Dept: OBGYN | Facility: CLINIC | Age: 45
End: 2025-06-19

## 2025-06-19 LAB
HPV I/H RISK 1 DNA SPEC QL NAA+PROBE: NOT DETECTED
SPECIMEN SOURCE: NORMAL
THINPREP PAP, CYTOLOGY NL11781: NORMAL